# Patient Record
Sex: MALE | Race: WHITE | Employment: FULL TIME | URBAN - METROPOLITAN AREA
[De-identification: names, ages, dates, MRNs, and addresses within clinical notes are randomized per-mention and may not be internally consistent; named-entity substitution may affect disease eponyms.]

---

## 2018-01-15 ENCOUNTER — OFFICE VISIT (OUTPATIENT)
Dept: INTERNAL MEDICINE CLINIC | Age: 48
End: 2018-01-15

## 2018-01-15 VITALS
DIASTOLIC BLOOD PRESSURE: 94 MMHG | BODY MASS INDEX: 39.37 KG/M2 | WEIGHT: 275 LBS | HEIGHT: 70 IN | TEMPERATURE: 98 F | SYSTOLIC BLOOD PRESSURE: 144 MMHG | OXYGEN SATURATION: 95 % | HEART RATE: 66 BPM | RESPIRATION RATE: 16 BRPM

## 2018-01-15 DIAGNOSIS — I87.2 VENOUS INSUFFICIENCY OF BOTH LOWER EXTREMITIES: ICD-10-CM

## 2018-01-15 DIAGNOSIS — L03.115 CELLULITIS OF RIGHT LOWER EXTREMITY: Primary | ICD-10-CM

## 2018-01-15 DIAGNOSIS — I10 ESSENTIAL HYPERTENSION: ICD-10-CM

## 2018-01-15 RX ORDER — CLINDAMYCIN HYDROCHLORIDE 300 MG/1
300 CAPSULE ORAL 3 TIMES DAILY
Qty: 21 CAP | Refills: 0 | Status: SHIPPED | OUTPATIENT
Start: 2018-01-15 | End: 2018-02-05 | Stop reason: ALTCHOICE

## 2018-01-15 RX ORDER — CHLORTHALIDONE 25 MG/1
25 TABLET ORAL DAILY
Qty: 90 TAB | Refills: 3 | Status: SHIPPED | OUTPATIENT
Start: 2018-01-15 | End: 2018-02-05 | Stop reason: SDUPTHER

## 2018-01-15 NOTE — PROGRESS NOTES
Jesus Manuel Roa 1970, is a 49 y.o. male, who is seen today for follow-up after being hospitalized for cellulitis of the right lower extremity. He states that he had developed chills and could not get warm all night and when he looked at his leg it was bright red and tender, he went to the urgent care center was sent to the emergency room and he was hospitalized. By the next day it was considerably worse but then by the following day he was afebrile and it looked better. He says he was treated with clindamycin in the hospital and after discharge, he is not sure of the dose but he says he was taking 3 pills 3 times daily. He has been off medicine for 2 days worked 13 hours one day last week and over the last 2 days the leg has gotten a little more tender and red and swollen. Both legs chronically swell a little bit and he has been off chlorthalidone since last fall when he ran out. He has not been back to the office for follow-up for well over a year, cancel his appointment apparently. He thinks his blood pressure was high even on chlorthalidone. He has a history of kidney stone but no recurrence. Past Medical History:   Diagnosis Date    Asthma     As a young child    Calculus of kidney     Hypertension     Sleep apnea     not using CPAP     He is currently taking no prescription medicine    Visit Vitals    BP (!) 144/94 (BP 1 Location: Right arm, BP Patient Position: Sitting)    Pulse 66    Temp 98 °F (36.7 °C) (Oral)    Resp 16    Ht 5' 10\" (1.778 m)    Wt 275 lb (124.7 kg)    SpO2 95%    BMI 39.46 kg/m2     The left lower extremity does reveal trace to 1+ edema but on the left there is at least 2+ edema in the lower leg is red anteriorly in the lower leg and that area is tender. Skin over that area is shiny. No tenderness in the calf or higher in the leg. Assessment: #1. Recent cellulitis right lower extremity with quite a bit more edema in that legs and in the left leg. It is now more red and swollen and did not take chances I will restart clindamycin for a week, 300 mg 3 times daily. #2.  Lower extremity edema, fertile ground for cellulitis. I will order medium compression support hose for him to be worn every day and taken off at night. #3. Hypertension with history of kidney stones and edema, I will restart chlorthalidone 25 mg daily and her blood pressure is not controlled on this medicine we may add something to it but I think he should stay on a thiazide diuretic view of his history of kidney stones and his hypertension and his edema. This visit lasted 25 minutes, greater than 50% of the time spent counseling on these conditions and rationale for treatment as above. Follow-up 3 weeks    Seth Pool MD FACP    Please note: This document has been produced using voice recognition software. Unrecognized errors in transcription may be present.

## 2018-01-15 NOTE — PROGRESS NOTES
Chief Complaint   Patient presents with   [facility] Follow Up     Follow up from [facility] for vcellulitis and discharged on 1/8/2018. Patient present for lower right leg cellulitis and patient states it has been getting better. Patient states that his leg has been with a lot of fluid retention and that the Hygroten wasn't really working, but wants a refill on it due to him running out of it in Dec. 2017. Patient also complains that his blood pressure went really high and continue to run high after being in the hospital.    Health Maintenance Due   Topic Date Due    COLONOSCOPY  11/14/1987    DTaP/Tdap/Td series (1 - Tdap) 11/14/1990    Influenza Age 5 to Adult  08/01/2017   Patient states he has never gotten a flu vaccine and doesn't want one at this time. 1. Have you been to the ER, urgent care clinic or hospitalized since your last visit? YES.     2. Have you seen or consulted any other health care providers outside of the [address] [place] [address] since your last visit (Include any pap smears or colon screening)? NO      Do you have an Advanced Directive? NO    Would you like information on Advanced Directives?  NO

## 2018-01-15 NOTE — MR AVS SNAPSHOT
Visit Information Date & Time Provider Department Dept. Phone Encounter #  
 1/15/2018 10:00 AM Ryan Olivier MD [facility] [address] [place] [address] Road [phone] [id number] Follow-up Instructions Return in about 3 weeks (around 2/5/2018). Upcoming Health Maintenance Date Due COLONOSCOPY 11/14/1987 DTaP/Tdap/Td series (1 - Tdap) 11/14/1990 Allergies as of 1/15/2018  Review Complete On: 1/15/2018 By: Ryan Olivier MD  
  
 Severity Noted Reaction Type Reactions Pcn [Penicillins]    Not Reported This Time  
 Sulfa (Sulfonamide Antibiotics)  08/21/2015    Other (comments) Current Immunizations  Never Reviewed No immunizations on file. Not reviewed this visit You Were Diagnosed With   
  
 Codes Comments Cellulitis of right lower extremity    -  Primary ICD-10-CM: X40.934 ICD-9-CM: 638. 6 Venous insufficiency of both lower extremities     ICD-10-CM: I87.2 ICD-9-CM: 459.81 Essential hypertension     ICD-10-CM: I10 
ICD-9-CM: 401.9 Vitals BP Pulse Temp Resp Height(growth percentile) Weight(growth percentile) (!) 150/100 (BP 1 Location: Right arm, BP Patient Position: Sitting) 66 98 °F (36.7 °C) (Oral) 16 5' 10\" (1.778 m) 275 lb (124.7 kg) SpO2 BMI Smoking Status 95% 39.46 kg/m2 Never Smoker Vitals History BMI and BSA Data Body Mass Index Body Surface Area  
 39.46 kg/m 2 2.48 m 2 Preferred Pharmacy Pharmacy Name Phone [facility], [address] [place] [address] [phone] Your Updated Medication List  
  
   
This list is accurate as of: 1/15/18 10:49 AM.  Always use your most recent med list.  
  
  
  
  
 chlorthalidone 25 mg tablet Commonly known as:   Ek Take 1 Tab by mouth daily. ibuprofen 600 mg tablet Commonly known as:  MOTRIN Take 1 Tab by mouth every eight (8) hours as needed. LORazepam 0.5 mg tablet Commonly known as:  ATIVAN Take 1 Tab by mouth every eight (8) hours as needed for Anxiety. potassium chloride SR 20 mEq tablet Commonly known as:  K-TAB Take 1 Tab by mouth daily. PROBIOTIC 4X 10-15 mg Tbec Generic drug:  B.infantis-B.ani-B.long-B.bifi Take  by mouth. Prescriptions Sent to Pharmacy Refills  
 chlorthalidone (HYGROTEN) 25 mg tablet 3 Sig: Take 1 Tab by mouth daily. Class: Normal  
 Pharmacy: [facility], [address] [place] [address] #: [phone] Route: Oral  
  
Follow-up Instructions Return in about 3 weeks (around 2/5/2018). Introducing [facility] & HEALTH SERVICES! Dear Luna Cloud: Thank you for requesting a [employer] account. Our records indicate that you already have an active [employer] account. You can access your account anytime at [email or web address] Did you know that you can access your hospital and ER discharge instructions at any time in [employer]? You can also review all of your test results from your hospital stay or ER visit. Additional Information If you have questions, please visit the Frequently Asked Questions section of the [employer] website at [email or web address] Remember, [employer] is NOT to be used for urgent needs. For medical emergencies, dial 911. Now available from your iPhone and Android! Please provide this summary of care documentation to your next provider. Your primary care clinician is listed as Itzel Da Silva. Christopher Riedel. If you have any questions after today's visit, please call [phone].

## 2018-01-18 RX ORDER — CHLORTHALIDONE 25 MG/1
TABLET ORAL
Qty: 90 TAB | Refills: 2 | Status: SHIPPED | OUTPATIENT
Start: 2018-01-18 | End: 2020-01-16 | Stop reason: SDUPTHER

## 2018-02-05 ENCOUNTER — OFFICE VISIT (OUTPATIENT)
Dept: INTERNAL MEDICINE CLINIC | Age: 48
End: 2018-02-05

## 2018-02-05 VITALS
DIASTOLIC BLOOD PRESSURE: 96 MMHG | BODY MASS INDEX: 39.31 KG/M2 | RESPIRATION RATE: 12 BRPM | SYSTOLIC BLOOD PRESSURE: 132 MMHG | HEART RATE: 77 BPM | TEMPERATURE: 98.2 F | OXYGEN SATURATION: 98 % | WEIGHT: 274.6 LBS | HEIGHT: 70 IN

## 2018-02-05 DIAGNOSIS — E66.9 OBESITY (BMI 30-39.9): ICD-10-CM

## 2018-02-05 DIAGNOSIS — I10 ESSENTIAL HYPERTENSION: Primary | ICD-10-CM

## 2018-02-05 DIAGNOSIS — I87.2 VENOUS INSUFFICIENCY OF BOTH LOWER EXTREMITIES: ICD-10-CM

## 2018-02-05 NOTE — MR AVS SNAPSHOT
[address] 
[phone] Patient: Barbara Novak 
MRN:  [id number] Visit Information Date & Time Provider Department Dept. Phone Encounter #  
 2018 10:00 AM Susanna Cerna MD [facility] [address] [place] [address] [phone] [id number] Follow-up Instructions Return in about 4 months (around 2018). Upcoming Health Maintenance Date Due COLONOSCOPY 1987 DTaP/Tdap/Td series (1 - Tdap) 1990 Allergies as of 2018  Review Complete On: 2018 By: Susanna Cerna MD  
  
 Severity Noted Reaction Type Reactions Pcn [Penicillins]    Not Reported This Time  
 Sulfa (Sulfonamide Antibiotics)  2015    Other (comments) Current Immunizations  Never Reviewed No immunizations on file. Not reviewed this visit You Were Diagnosed With   
  
 Codes Comments Essential hypertension    -  Primary ICD-10-CM: I10 
ICD-9-CM: 401.9 Venous insufficiency of both lower extremities     ICD-10-CM: I87.2 ICD-9-CM: 459.81 Obesity (BMI 30-39. 9)     ICD-10-CM: E66.9 ICD-9-CM: 278.00 Vitals BP Pulse Temp Resp Height(growth percentile) Weight(growth percentile) (!) 132/96 77 98.2 °F (36.8 °C) (Oral) 12 5' 10\" (1.778 m) 274 lb 9.6 oz (124.6 kg) SpO2 BMI Smoking Status 98% 39.4 kg/m2 Never Smoker Vitals History BMI and BSA Data Body Mass Index Body Surface Area  
 39.4 kg/m 2 2.48 m 2 Preferred Pharmacy Pharmacy Name Phone [facility], [address] [place] [address] [phone] Your Updated Medication List  
  
   
This list is accurate as of: 18 10:31 AM.  Always use your most recent med list.  
  
  
  
  
 chlorthalidone 25 mg tablet Commonly known as:  HYGROTEN  
TAKE 1 TABLET BY MOUTH EVERY DAY Follow-up Instructions Return in about 4 months (around 6/5/2018). Introducing [facility] & HEALTH SERVICES! Dear Mely Dugan: Thank you for requesting a [employer] account. Our records indicate that you already have an active [employer] account. You can access your account anytime at [email or web address] Did you know that you can access your hospital and ER discharge instructions at any time in [employer]? You can also review all of your test results from your hospital stay or ER visit. Additional Information If you have questions, please visit the Frequently Asked Questions section of the [employer] website at [email or web address] Remember, [employer] is NOT to be used for urgent needs. For medical emergencies, dial 911. Now available from your iPhone and Android! Please provide this summary of care documentation to your next provider. Your primary care clinician is listed as José Miguel Horne. Susanne Bruno. If you have any questions after today's visit, please call [phone].

## 2018-02-05 NOTE — PROGRESS NOTES
Lynette Dennison 1970, is a 49 y.o. male, who is seen today for reevaluation of hypertension lower extremity edema due to venous insufficiency, possible cellulitis on the right, obesity and history of kidney stones. He has gotten support hose and is wearing those and that has helped a lot with the swelling. He elevates his legs whenever possible. He is taking chlorthalidone every day. He has not lost any weight from when I saw him 3 weeks ago. Past Medical History:   Diagnosis Date    Asthma     As a young child    Calculus of kidney     Hypertension     Sleep apnea     not using CPAP     Current Outpatient Prescriptions   Medication Sig Dispense Refill    chlorthalidone (HYGROTEN) 25 mg tablet TAKE 1 TABLET BY MOUTH EVERY DAY 90 Tab 2     Visit Vitals    BP (!) 132/96    Pulse 77    Temp 98.2 °F (36.8 °C) (Oral)    Resp 12    Ht 5' 10\" (1.778 m)    Wt 274 lb 9.6 oz (124.6 kg)    SpO2 98%    BMI 39.4 kg/m2     Carotids are 2+ without bruits. Lungs are clear to percussion. Good breath sounds with no wheezing or crackles. Heart reveals a regular rhythm with normal S1 and S2 no murmur gallop click or rub. Apical impulse is not palpable. Abdomen is obese soft nontender with no hepatosplenomegaly or masses. Lower legs reveal 2+ edema with some redness in the lower right leg but no evidence of infection at this point. There is no weeping of fluid. Mild tenderness of the lower right leg. Assessment: #1.  Lower extremity edema with no evidence of infection. He will continue support hose and leg elevation as much as possible. #2.  Obesity with body mass index of 39.4, I have told him he needs to lose considerable weight to decrease his risks from continued swelling of the legs and hypertension and other issues. He will be changing his diet somewhat and see if he can lose some weight. #3. Hypertension inadequately controlled.   He needs to lose more weight, he will continue low-salt diet and chlorthalidone 25 mg daily. Follow-up 4 months    Seth Feliz MD FACP    Please note: This document has been produced using voice recognition software. Unrecognized errors in transcription may be present.

## 2018-06-13 ENCOUNTER — OFFICE VISIT (OUTPATIENT)
Dept: INTERNAL MEDICINE CLINIC | Age: 48
End: 2018-06-13

## 2018-06-13 VITALS
SYSTOLIC BLOOD PRESSURE: 138 MMHG | TEMPERATURE: 98.5 F | OXYGEN SATURATION: 97 % | WEIGHT: 272 LBS | HEART RATE: 85 BPM | RESPIRATION RATE: 12 BRPM | BODY MASS INDEX: 38.94 KG/M2 | DIASTOLIC BLOOD PRESSURE: 88 MMHG | HEIGHT: 70 IN

## 2018-06-13 DIAGNOSIS — I10 PRIMARY HYPERTENSION: Primary | ICD-10-CM

## 2018-06-13 DIAGNOSIS — R63.5 WEIGHT GAIN: ICD-10-CM

## 2018-06-13 DIAGNOSIS — I10 PRIMARY HYPERTENSION: ICD-10-CM

## 2018-06-13 DIAGNOSIS — Z00.00 ROUTINE GENERAL MEDICAL EXAMINATION AT A HEALTH CARE FACILITY: ICD-10-CM

## 2018-06-13 DIAGNOSIS — E66.9 OBESITY (BMI 30-39.9): ICD-10-CM

## 2018-06-13 NOTE — PROGRESS NOTES
Darius Moran 1970, is a 49 y.o. male, who is seen today for reevaluation of hypertension obesity but also complains of tingling sensation sometimes in the face sometimes all up and down both arms. It will last up to 2 hours at a time, comes when he is sitting around or with activity. It was just watching a baseball game recently when it occurred. It has not happened the last 2 days but over the last month it has happened numerous times. He says when he had this a few years ago he was placed on potassium and it went away but then he stopped the potassium. He takes chlorthalidone for hypertension and tries to follow a low-salt diet. He tries to keep his weight down but has not been terribly successful. No other new complaints. Past Medical History:   Diagnosis Date    Asthma     As a young child    Calculus of kidney     Hypertension     Sleep apnea     not using CPAP     Current Outpatient Prescriptions   Medication Sig Dispense Refill    chlorthalidone (HYGROTEN) 25 mg tablet TAKE 1 TABLET BY MOUTH EVERY DAY 90 Tab 2     Visit Vitals    /88    Pulse 85    Temp 98.5 °F (36.9 °C) (Oral)    Resp 12    Ht 5' 10\" (1.778 m)    Wt 272 lb (123.4 kg)    SpO2 97%    BMI 39.03 kg/m2     Carotids are 2+ without bruits. Lungs are clear to percussion. Good breath sounds with no wheezing or crackles. Heart reveals a regular rhythm with normal S1-S2 no murmur gallop click or rub. Apical impulse is not palpable. Abdomen is soft and nontender with no obvious hepatosplenomegaly or masses and no bruits. Extremities reveal no clubbing cyanosis or edema. Pulses are 2+ throughout. Assessment: #1. Hypertension poorly controlled. He will continue chlorthalidone 25 mg daily and no added salt diet. #2.  Obesity, have explained to him today again how important it is to start to lose weight. He will be cutting back on white starches and eating more vegetables and salads.   #3. Paresthesias, he is quite sure that is not related to hyperventilation or anxiety. We will check BMP and magnesium at this point and replace any electrolytes if needed based on those results within the next day or 2, if symptoms worsen he will call me. Follow-up 6 months for a physical    Seth Poon MD FACP    Please note: This document has been produced using voice recognition software. Unrecognized errors in transcription may be present.

## 2018-06-13 NOTE — MR AVS SNAPSHOT
[facility] 
 
 
 [address], [address] [facility] 
[phone] Patient: Lorraine Suero 
MRN:  [id number] Visit Information Date & Time Provider Department Dept. Phone Encounter #  
 2018  3:30 PM Pipo Massey MD [facility] [phone] Your Appointments 2018  8:15 AM  
LAB with [facility] NURSE VISIT [facility] ([facility] [address], [address] [place] [address] [place]  
  
   
 [address], [address]  
  
    
 2018  7:45 AM  
PHYSICAL with Pipo Massey MD  
[facility] Appt Note: rpe  
 [address] [facility] [address] [place]  
  
   
 [address], [address] Upcoming Health Maintenance Date Due COLONOSCOPY 1987 DTaP/Tdap/Td series (1 - Tdap) 1990 Influenza Age 5 to Adult 2018 Allergies as of 2018  Review Complete On: 2018 By: Pipo Massey MD  
  
 Severity Noted Reaction Type Reactions Pcn [Penicillins]    Not Reported This Time  
 Sulfa (Sulfonamide Antibiotics)  2015    Other (comments) Current Immunizations  Never Reviewed No immunizations on file. Not reviewed this visit You Were Diagnosed With   
  
 Codes Comments Primary hypertension    -  Primary ICD-10-CM: I10 
ICD-9-CM: 401.9 Paresthesias/numbness     ICD-10-CM: R20.9 ICD-9-CM: 782.0 Obesity (BMI 30-39. 9)     ICD-10-CM: E66.9 ICD-9-CM: 278.00 Routine general medical examination at a health care facility     ICD-10-CM: Z00.00 ICD-9-CM: V70.0 Vitals BP Pulse Temp Resp Height(growth percentile) Weight(growth percentile) 138/88 85 98.5 °F (36.9 °C) (Oral) 12 5' 10\" (1.778 m) 272 lb (123.4 kg) SpO2 BMI Smoking Status 97% 39.03 kg/m2 Never Smoker Vitals History BMI and BSA Data Body Mass Index Body Surface Area 39.03 kg/m 2 2.47 m 2 Preferred Pharmacy Pharmacy Name Phone [facility] [phone] Your Updated Medication List  
  
   
This list is accurate as of 6/13/18  4:35 PM.  Always use your most recent med list.  
  
  
  
  
 chlorthalidone 25 mg tablet Commonly known as:  HYGROTEN  
TAKE 1 TABLET BY MOUTH EVERY DAY To-Do List   
 Around 06/13/2018 Lab:  MAGNESIUM Around 06/13/2018 Lab:  METABOLIC PANEL, BASIC Introducing \[facility]\"" & [facility] SERVICES! Dear Conception Caitlyn: Thank you for requesting a Accelo[employer] account. Our records indicate that you already have an active [employer] account. You can access your account anytime at [email or web address] Did you know that you can access your hospital and ER discharge instructions at any time in AcceloWeb? You can also review all of your test results from your hospital stay or ER visit. Additional Information If you have questions, please visit the Frequently Asked Questions section of the [employer] website at [email or web address] Remember, [employer]eloWeb is NOT to be used for urgent needs. For medical emergencies, dial 911. Now available from your iPhone and Android! Please provide this summary of care documentation to your next provider. Your primary care clinician is listed as Hailee Madden. Davonte Jeong. If you have any questions after today's visit, please call [phone].

## 2018-06-14 LAB
ANION GAP SERPL CALC-SCNC: 19 MMOL/L
BUN SERPL-MCNC: 15 MG/DL (ref 6–22)
CALCIUM SERPL-MCNC: 9.9 MG/DL (ref 8.4–10.4)
CHLORIDE SERPL-SCNC: 99 MMOL/L (ref 98–110)
CO2 SERPL-SCNC: 26 MMOL/L (ref 20–32)
CREAT SERPL-MCNC: 1 MG/DL (ref 0.5–1.2)
GFRAA, 66117: >60
GFRNA, 66118: >60
GLUCOSE SERPL-MCNC: 139 MG/DL (ref 70–99)
MAGNESIUM SERPL-MCNC: 2 MG/DL (ref 1.6–2.5)
POTASSIUM SERPL-SCNC: 3.3 MMOL/L (ref 3.5–5.5)
SODIUM SERPL-SCNC: 144 MMOL/L (ref 133–145)

## 2018-06-15 ENCOUNTER — TELEPHONE (OUTPATIENT)
Dept: INTERNAL MEDICINE CLINIC | Age: 48
End: 2018-06-15

## 2018-06-15 RX ORDER — POTASSIUM CHLORIDE 20 MEQ/1
20 TABLET, EXTENDED RELEASE ORAL DAILY
Qty: 90 TAB | Refills: 1 | Status: SHIPPED | OUTPATIENT
Start: 2018-06-15 | End: 2018-12-19 | Stop reason: ALTCHOICE

## 2018-06-15 NOTE — PROGRESS NOTES
Please notify the patient that his magnesium level is normal and other electrolytes are normal except potassium is somewhat low at 3.3. It runs low because of chlorthalidone. I will send a prescription to his pharmacy for potassium at this point and see if that helps.

## 2018-06-15 NOTE — TELEPHONE ENCOUNTER
----- Message from Pipo Massey MD sent at 6/15/2018  3:35 PM EDT -----  Please notify the patient that his magnesium level is normal and other electrolytes are normal except potassium is somewhat low at 3.3. It runs low because of chlorthalidone. I will send a prescription to his pharmacy for potassium at this point and see if that helps.

## 2018-09-11 RX ORDER — CHLORTHALIDONE 25 MG/1
TABLET ORAL
Qty: 90 TAB | Refills: 1 | Status: SHIPPED | OUTPATIENT
Start: 2018-09-11 | End: 2018-12-19 | Stop reason: ALTCHOICE

## 2018-12-12 ENCOUNTER — APPOINTMENT (OUTPATIENT)
Dept: INTERNAL MEDICINE CLINIC | Age: 48
End: 2018-12-12

## 2018-12-13 LAB
A-G RATIO,AGRAT: 2.6 RATIO (ref 1.1–2.6)
ABSOLUTE LYMPHOCYTE COUNT, 10803: 1 K/UL (ref 1–4.8)
ALBUMIN SERPL-MCNC: 4.6 G/DL (ref 3.5–5)
ALP SERPL-CCNC: 57 U/L (ref 25–115)
ALT SERPL-CCNC: 44 U/L (ref 5–40)
ANION GAP SERPL CALC-SCNC: 14 MMOL/L
AST SERPL W P-5'-P-CCNC: 27 U/L (ref 10–37)
BASOPHILS # BLD: 0 K/UL (ref 0–0.2)
BASOPHILS NFR BLD: 1 % (ref 0–2)
BILIRUB SERPL-MCNC: 0.6 MG/DL (ref 0.2–1.2)
BUN SERPL-MCNC: 14 MG/DL (ref 6–22)
CALCIUM SERPL-MCNC: 9.3 MG/DL (ref 8.4–10.4)
CHLORIDE SERPL-SCNC: 100 MMOL/L (ref 98–110)
CHOLEST SERPL-MCNC: 213 MG/DL (ref 110–200)
CO2 SERPL-SCNC: 26 MMOL/L (ref 20–32)
CREAT SERPL-MCNC: 0.9 MG/DL (ref 0.5–1.2)
EOSINOPHIL # BLD: 0.4 K/UL (ref 0–0.5)
EOSINOPHIL NFR BLD: 11 % (ref 0–6)
ERYTHROCYTE [DISTWIDTH] IN BLOOD BY AUTOMATED COUNT: 13.3 % (ref 10–15.5)
GFRAA, 66117: >60
GFRNA, 66118: >60
GLOBULIN,GLOB: 1.8 G/DL (ref 2–4)
GLUCOSE SERPL-MCNC: 118 MG/DL (ref 70–99)
GRANULOCYTES,GRANS: 44 % (ref 40–75)
HCT VFR BLD AUTO: 47 % (ref 39.3–51.6)
HDLC SERPL-MCNC: 39 MG/DL (ref 40–59)
HDLC SERPL-MCNC: 5.5 MG/DL (ref 0–5)
HGB BLD-MCNC: 16.3 G/DL (ref 13.1–17.2)
LDLC SERPL CALC-MCNC: 141 MG/DL (ref 50–99)
LYMPHOCYTES, LYMLT: 32 % (ref 20–45)
MCH RBC QN AUTO: 34 PG (ref 26–34)
MCHC RBC AUTO-ENTMCNC: 35 G/DL (ref 31–36)
MCV RBC AUTO: 97 FL (ref 80–95)
MONOCYTES # BLD: 0.4 K/UL (ref 0.1–1)
MONOCYTES NFR BLD: 13 % (ref 3–12)
NEUTROPHILS # BLD AUTO: 1.4 K/UL (ref 1.8–7.7)
PLATELET # BLD AUTO: 113 K/UL (ref 140–440)
PMV BLD AUTO: 11.3 FL (ref 9–13)
POTASSIUM SERPL-SCNC: 3.4 MMOL/L (ref 3.5–5.5)
PROT SERPL-MCNC: 6.4 G/DL (ref 6.4–8.3)
RBC # BLD AUTO: 4.83 M/UL (ref 3.8–5.8)
SODIUM SERPL-SCNC: 140 MMOL/L (ref 133–145)
T4 FREE SERPL-MCNC: 1.1 NG/DL (ref 0.9–1.8)
TRIGL SERPL-MCNC: 167 MG/DL (ref 40–149)
TSH SERPL DL<=0.005 MIU/L-ACNC: 0.47 MCU/ML (ref 0.27–4.2)
VLDLC SERPL CALC-MCNC: 33 MG/DL (ref 8–30)
WBC # BLD AUTO: 3.2 K/UL (ref 4–11)

## 2018-12-19 ENCOUNTER — OFFICE VISIT (OUTPATIENT)
Dept: INTERNAL MEDICINE CLINIC | Age: 48
End: 2018-12-19

## 2018-12-19 VITALS
HEIGHT: 70 IN | OXYGEN SATURATION: 96 % | BODY MASS INDEX: 39.77 KG/M2 | TEMPERATURE: 97.9 F | DIASTOLIC BLOOD PRESSURE: 88 MMHG | WEIGHT: 277.8 LBS | SYSTOLIC BLOOD PRESSURE: 138 MMHG | RESPIRATION RATE: 14 BRPM | HEART RATE: 69 BPM

## 2018-12-19 DIAGNOSIS — Z00.00 ROUTINE GENERAL MEDICAL EXAMINATION AT A HEALTH CARE FACILITY: Primary | ICD-10-CM

## 2018-12-19 DIAGNOSIS — E66.9 OBESITY (BMI 30-39.9): ICD-10-CM

## 2018-12-19 DIAGNOSIS — R73.01 IMPAIRED FASTING GLUCOSE: ICD-10-CM

## 2018-12-19 DIAGNOSIS — I10 PRIMARY HYPERTENSION: ICD-10-CM

## 2018-12-19 DIAGNOSIS — I87.2 VENOUS INSUFFICIENCY OF BOTH LOWER EXTREMITIES: ICD-10-CM

## 2018-12-19 NOTE — PROGRESS NOTES
Mirta Smith 1970, is a 51 y.o. male, who is seen today for a routine physical exam and follow-up on obesity hypertension and impaired fasting glucose. I talked to him before about the importance of weight loss but he is gained 5 pounds over the last 6 months, he admits that he eats too many white starches. He feels well. He takes hydrochlorthiazide every day but only uses potassium if he feels some tingling in his hands which he does from time to time. Past Medical History:   Diagnosis Date    Asthma     As a young child    Calculus of kidney     Hypertension     Sleep apnea     not using CPAP     Past Surgical History:   Procedure Laterality Date    HX HEENT      tonsillectomy    HX LITHOTRIPSY  10/9/2010    Left upper 3rd ureteral calculus    HX ORTHOPAEDIC      Left shoulder     Current Outpatient Medications   Medication Sig Dispense Refill    chlorthalidone (HYGROTEN) 25 mg tablet TAKE 1 TABLET BY MOUTH EVERY DAY 90 Tab 2     Allergies   Allergen Reactions    Pcn [Penicillins] Not Reported This Time    Sulfa (Sulfonamide Antibiotics) Other (comments)     Social History     Socioeconomic History    Marital status:      Spouse name: Not on file    Number of children: Not on file    Years of education: Not on file    Highest education level: Not on file   Tobacco Use    Smoking status: Never Smoker    Smokeless tobacco: Never Used   Substance and Sexual Activity    Alcohol use: Yes     Alcohol/week: 1.2 oz     Types: 2 Cans of beer per week     Comment: occasionally     Drug use: No     Visit Vitals  /88 (BP 1 Location: Right arm, BP Patient Position: Sitting)   Pulse 69   Temp 97.9 °F (36.6 °C) (Oral)   Resp 14   Ht 5' 10\" (1.778 m)   Wt 277 lb 12.8 oz (126 kg)   SpO2 96%   BMI 39.86 kg/m²       Ear canals and tympanic membranes appear normal.  Oral cavity reveals no lesions. Neck reveals no adenopathy or thyromegaly. Carotids are 2+ without bruits.   Lungs are clear to percussion. Good breath sounds with no wheezing or crackles. Heart reveals a regular rhythm with normal S1 and S2 no murmur gallop click or rub. Apical impulse is not palpable. Abdomen is soft and nontender with no hepatosplenomegaly or masses and no bruits. Extremities reveal no clubbing or cyanosis, he does have mild venous stasis changes in both legs and trace edema bilaterally. Pulses are 2+. Results for orders placed or performed in visit on 12/12/18   LIPID PANEL   Result Value Ref Range    Triglyceride 167 (H) 40 - 149 mg/dL    HDL Cholesterol 39 (L) 40 - 59 mg/dL    Cholesterol, total 213 (H) 110 - 200 mg/dL    CHOLESTEROL/HDL 5.5 0.0 - 5.0    LDL, calculated 141 (H) 50 - 99 mg/dL    VLDL, calculated 33 (H) 8 - 30 mg/dL   METABOLIC PANEL, COMPREHENSIVE   Result Value Ref Range    Glucose 118 (H) 70 - 99 mg/dL    BUN 14 6 - 22 mg/dL    Creatinine 0.9 0.5 - 1.2 mg/dL    Sodium 140 133 - 145 mmol/L    Potassium 3.4 (L) 3.5 - 5.5 mmol/L    Chloride 100 98 - 110 mmol/L    CO2 26 20 - 32 mmol/L    AST (SGOT) 27 10 - 37 U/L    ALT (SGPT) 44 (H) 5 - 40 U/L    Alk.  phosphatase 57 25 - 115 U/L    Bilirubin, total 0.6 0.2 - 1.2 mg/dL    Calcium 9.3 8.4 - 10.4 mg/dL    Protein, total 6.4 6.4 - 8.3 g/dL    Albumin 4.6 3.5 - 5.0 g/dL    A-G Ratio 2.6 1.1 - 2.6 ratio    Globulin 1.8 (L) 2.0 - 4.0 g/dL    Anion gap 14.0 mmol/L    GFRAA >60.0 >60.0    GFRNA >60.0 >60.0   T4, FREE   Result Value Ref Range    T4, Free 1.1 0.9 - 1.8 ng/dL   TSH 3RD GENERATION   Result Value Ref Range    TSH 0.47 0.27 - 4.20 mcU/mL   CBC WITH AUTOMATED DIFF   Result Value Ref Range    WBC 3.2 (L) 4.0 - 11.0 K/uL    RBC 4.83 3.80 - 5.80 M/uL    HGB 16.3 13.1 - 17.2 g/dL    HCT 47.0 39.3 - 51.6 %    MCV 97 (H) 80 - 95 fL    MCH 34 26 - 34 pg    MCHC 35 31 - 36 g/dL    RDW 13.3 10.0 - 15.5 %    PLATELET 700 (L) 375 - 440 K/uL    MPV 11.3 9.0 - 13.0 fL    NEUTROPHILS 44 40 - 75 %    Lymphocytes 32 20 - 45 %    MONOCYTES 13 (H) 3 - 12 %    EOSINOPHILS 11 (H) 0 - 6 %    BASOPHILS 1 0 - 2 %    ABS. NEUTROPHILS 1.4 (L) 1.8 - 7.7 K/uL    ABSOLUTE LYMPHOCYTE COUNT 1.0 1.0 - 4.8 K/uL    ABS. MONOCYTES 0.4 0.1 - 1.0 K/uL    ABS. EOSINOPHILS 0.4 0.0 - 0.5 K/uL    ABS. BASOPHILS 0.0 0.0 - 0.2 K/uL     Assessment: #1. Hypertension is adequately controlled. He will continue chlorthalidone 25 mg daily and no added salt diet. #2.  Impaired fasting glucose slightly better than last visit, I talked to him again today about the importance of weight loss, trying to avoid the development of diabetes. #3.  Obesity up 5 pounds in 6 months, I talked to him at length about methods that may be helpful in losing weight, namely cutting back on white starches and eating more vegetables and salads in their place. Follow-up in 6 months with lab, he declines flu shot as always. Seth Tracey MD FACP    Please note: This document has been produced using voice recognition software. Unrecognized errors in transcription may be present.

## 2018-12-19 NOTE — PROGRESS NOTES
1. Have you been to the ER, urgent care clinic or hospitalized since your last visit? NO           2. Have you seen or consulted any other health care providers outside of the [address] [place] [address] since your last visit (Include any pap smears or colon screening)? NO    Do you have an Advanced Directive? NO    Would you like information on Advanced Directives?  NO

## 2018-12-20 DIAGNOSIS — R63.5 WEIGHT GAIN: ICD-10-CM

## 2018-12-20 DIAGNOSIS — Z00.00 ROUTINE GENERAL MEDICAL EXAMINATION AT A HEALTH CARE FACILITY: ICD-10-CM

## 2019-06-19 ENCOUNTER — APPOINTMENT (OUTPATIENT)
Dept: INTERNAL MEDICINE CLINIC | Age: 49
End: 2019-06-19

## 2019-06-20 LAB
ANION GAP SERPL CALC-SCNC: 15 MMOL/L
BUN SERPL-MCNC: 15 MG/DL (ref 6–22)
CALCIUM SERPL-MCNC: 9.4 MG/DL (ref 8.4–10.4)
CHLORIDE SERPL-SCNC: 98 MMOL/L (ref 98–110)
CO2 SERPL-SCNC: 29 MMOL/L (ref 20–32)
CREAT SERPL-MCNC: 1 MG/DL (ref 0.5–1.2)
GFRAA, 66117: >60
GFRNA, 66118: >60
GLUCOSE SERPL-MCNC: 109 MG/DL (ref 70–99)
POTASSIUM SERPL-SCNC: 3.2 MMOL/L (ref 3.5–5.5)
SODIUM SERPL-SCNC: 142 MMOL/L (ref 133–145)

## 2019-06-26 ENCOUNTER — OFFICE VISIT (OUTPATIENT)
Dept: INTERNAL MEDICINE CLINIC | Age: 49
End: 2019-06-26

## 2019-06-26 VITALS
TEMPERATURE: 97.5 F | WEIGHT: 263.4 LBS | HEIGHT: 70 IN | DIASTOLIC BLOOD PRESSURE: 88 MMHG | RESPIRATION RATE: 12 BRPM | BODY MASS INDEX: 37.71 KG/M2 | HEART RATE: 70 BPM | OXYGEN SATURATION: 97 % | SYSTOLIC BLOOD PRESSURE: 130 MMHG

## 2019-06-26 DIAGNOSIS — R73.01 IMPAIRED FASTING GLUCOSE: ICD-10-CM

## 2019-06-26 DIAGNOSIS — I10 PRIMARY HYPERTENSION: Primary | ICD-10-CM

## 2019-06-26 DIAGNOSIS — E66.9 OBESITY (BMI 30-39.9): ICD-10-CM

## 2019-06-26 DIAGNOSIS — I10 PRIMARY HYPERTENSION: ICD-10-CM

## 2019-06-26 DIAGNOSIS — F41.9 ANXIETY: ICD-10-CM

## 2019-06-26 DIAGNOSIS — Z00.00 ROUTINE GENERAL MEDICAL EXAMINATION AT A HEALTH CARE FACILITY: ICD-10-CM

## 2019-06-26 RX ORDER — LORAZEPAM 0.5 MG/1
TABLET ORAL
COMMUNITY
End: 2019-06-26 | Stop reason: SDUPTHER

## 2019-06-26 RX ORDER — POTASSIUM CHLORIDE 20 MEQ/1
20 TABLET, EXTENDED RELEASE ORAL DAILY
COMMUNITY
End: 2021-08-31 | Stop reason: SDUPTHER

## 2019-06-26 RX ORDER — LORAZEPAM 0.5 MG/1
0.5 TABLET ORAL
Qty: 60 TAB | Refills: 0 | Status: SHIPPED | OUTPATIENT
Start: 2019-06-26 | End: 2020-01-16 | Stop reason: SDUPTHER

## 2019-06-26 NOTE — PROGRESS NOTES
Javier Pena 1970, is a 51 y.o. male, who is seen today for reevaluation of hypertension obesity impaired fasting glucose anxiety. He occasionally uses lorazepam 0.5 mg and that helps him. He needs a refill. He takes his blood pressure medicine regularly. We talked about weight loss 6 months ago and he says he lost 30 pounds but then regained some of that weight and is down just 14 pounds from when he was 6 months ago. He feels well and is taking his blood pressure medicine regularly. Past Medical History:   Diagnosis Date    Asthma     As a young child    Calculus of kidney     Hypertension     Sleep apnea     not using CPAP     Current Outpatient Medications   Medication Sig Dispense Refill    potassium chloride (KLOR-CON M20) 20 mEq tablet Take  by mouth two (2) times a day.  LORazepam (ATIVAN) 0.5 mg tablet Take 1 Tab by mouth every four (4) hours as needed for Anxiety. Max Daily Amount: 3 mg. 60 Tab 0    chlorthalidone (HYGROTEN) 25 mg tablet TAKE 1 TABLET BY MOUTH EVERY DAY 90 Tab 2     Visit Vitals  /88 (BP 1 Location: Left arm, BP Patient Position: Sitting)   Pulse 70   Temp 97.5 °F (36.4 °C) (Oral)   Resp 12   Ht 5' 10\" (1.778 m)   Wt 263 lb 6.4 oz (119.5 kg)   SpO2 97%   BMI 37.79 kg/m²     Carotids are 2+ without bruits. Lungs are clear to percussion. Good breath sounds with no wheezing or crackles. Heart reveals a regular rhythm with normal S1 and S2 no murmur gallop click or rub. Apical impulse is not palpable. Abdomen soft and nontender with no hepatospleno megaly or masses and no bruits. Extremities reveal no clubbing cyanosis or edema. Pulses are 2+.     Results for orders placed or performed in visit on 12/12/18   LIPID PANEL   Result Value Ref Range    Triglyceride 167 (H) 40 - 149 mg/dL    HDL Cholesterol 39 (L) 40 - 59 mg/dL    Cholesterol, total 213 (H) 110 - 200 mg/dL    CHOLESTEROL/HDL 5.5 0.0 - 5.0    LDL, calculated 141 (H) 50 - 99 mg/dL VLDL, calculated 33 (H) 8 - 30 mg/dL   METABOLIC PANEL, COMPREHENSIVE   Result Value Ref Range    Glucose 118 (H) 70 - 99 mg/dL    BUN 14 6 - 22 mg/dL    Creatinine 0.9 0.5 - 1.2 mg/dL    Sodium 140 133 - 145 mmol/L    Potassium 3.4 (L) 3.5 - 5.5 mmol/L    Chloride 100 98 - 110 mmol/L    CO2 26 20 - 32 mmol/L    AST (SGOT) 27 10 - 37 U/L    ALT (SGPT) 44 (H) 5 - 40 U/L    Alk. phosphatase 57 25 - 115 U/L    Bilirubin, total 0.6 0.2 - 1.2 mg/dL    Calcium 9.3 8.4 - 10.4 mg/dL    Protein, total 6.4 6.4 - 8.3 g/dL    Albumin 4.6 3.5 - 5.0 g/dL    A-G Ratio 2.6 1.1 - 2.6 ratio    Globulin 1.8 (L) 2.0 - 4.0 g/dL    Anion gap 14.0 mmol/L    GFRAA >60.0 >60.0    GFRNA >60.0 >60.0   T4, FREE   Result Value Ref Range    T4, Free 1.1 0.9 - 1.8 ng/dL   TSH 3RD GENERATION   Result Value Ref Range    TSH 0.47 0.27 - 4.20 mcU/mL   CBC WITH AUTOMATED DIFF   Result Value Ref Range    WBC 3.2 (L) 4.0 - 11.0 K/uL    RBC 4.83 3.80 - 5.80 M/uL    HGB 16.3 13.1 - 17.2 g/dL    HCT 47.0 39.3 - 51.6 %    MCV 97 (H) 80 - 95 fL    MCH 34 26 - 34 pg    MCHC 35 31 - 36 g/dL    RDW 13.3 10.0 - 15.5 %    PLATELET 201 (L) 661 - 440 K/uL    MPV 11.3 9.0 - 13.0 fL    NEUTROPHILS 44 40 - 75 %    Lymphocytes 32 20 - 45 %    MONOCYTES 13 (H) 3 - 12 %    EOSINOPHILS 11 (H) 0 - 6 %    BASOPHILS 1 0 - 2 %    ABS. NEUTROPHILS 1.4 (L) 1.8 - 7.7 K/uL    ABSOLUTE LYMPHOCYTE COUNT 1.0 1.0 - 4.8 K/uL    ABS. MONOCYTES 0.4 0.1 - 1.0 K/uL    ABS. EOSINOPHILS 0.4 0.0 - 0.5 K/uL    ABS. BASOPHILS 0.0 0.0 - 0.2 K/uL     Assessment: #1. Impaired fasting glucose improved, I have encouraged him to keep his weight down the very best he can, with 2500 jed a day seems to do quite well. #2. Hypertension controlled, he will continue chlorthalidone 25 mg daily and work on weight loss. #3. Anxiety doing well, he will continue lorazepam 0.5 mg when needed. #4.  Obesity improved, he is going to be working on further weight loss as noted above.     Follow-up 6 months for complete evaluation and we will schedule colonoscopy then. Seth Cartwright MD FACP    Please note: This document has been produced using voice recognition software. Unrecognized errors in transcription may be present.

## 2019-09-24 RX ORDER — CHLORTHALIDONE 25 MG/1
TABLET ORAL
Qty: 90 TAB | Refills: 1 | Status: SHIPPED | OUTPATIENT
Start: 2019-09-24 | End: 2020-04-21

## 2020-01-02 DIAGNOSIS — Z00.00 ROUTINE GENERAL MEDICAL EXAMINATION AT A HEALTH CARE FACILITY: ICD-10-CM

## 2020-01-11 LAB
ALBUMIN SERPL-MCNC: 4.7 G/DL (ref 3.5–5.5)
ALBUMIN/GLOB SERPL: 2.9 {RATIO} (ref 1.2–2.2)
ALP SERPL-CCNC: 57 IU/L (ref 39–117)
ALT SERPL-CCNC: 58 IU/L (ref 0–44)
AST SERPL-CCNC: 35 IU/L (ref 0–40)
BASOPHILS # BLD AUTO: 0 X10E3/UL (ref 0–0.2)
BASOPHILS NFR BLD AUTO: 1 %
BILIRUB SERPL-MCNC: 0.5 MG/DL (ref 0–1.2)
BUN SERPL-MCNC: 17 MG/DL (ref 6–24)
BUN/CREAT SERPL: 16 (ref 9–20)
CALCIUM SERPL-MCNC: 9.4 MG/DL (ref 8.7–10.2)
CHLORIDE SERPL-SCNC: 98 MMOL/L (ref 96–106)
CHOLEST SERPL-MCNC: 224 MG/DL (ref 100–199)
CO2 SERPL-SCNC: 24 MMOL/L (ref 20–29)
CREAT SERPL-MCNC: 1.08 MG/DL (ref 0.76–1.27)
EOSINOPHIL # BLD AUTO: 0.4 X10E3/UL (ref 0–0.4)
EOSINOPHIL NFR BLD AUTO: 11 %
ERYTHROCYTE [DISTWIDTH] IN BLOOD BY AUTOMATED COUNT: 12.9 % (ref 11.6–15.4)
GLOBULIN SER CALC-MCNC: 1.6 G/DL (ref 1.5–4.5)
GLUCOSE SERPL-MCNC: 111 MG/DL (ref 65–99)
HCT VFR BLD AUTO: 48.8 % (ref 37.5–51)
HDLC SERPL-MCNC: 38 MG/DL
HGB BLD-MCNC: 16.8 G/DL (ref 13–17.7)
IMM GRANULOCYTES # BLD AUTO: 0 X10E3/UL (ref 0–0.1)
IMM GRANULOCYTES NFR BLD AUTO: 0 %
INTERPRETATION, 910389: NORMAL
LDLC SERPL CALC-MCNC: 156 MG/DL (ref 0–99)
LYMPHOCYTES # BLD AUTO: 1.2 X10E3/UL (ref 0.7–3.1)
LYMPHOCYTES NFR BLD AUTO: 34 %
MCH RBC QN AUTO: 32.7 PG (ref 26.6–33)
MCHC RBC AUTO-ENTMCNC: 34.4 G/DL (ref 31.5–35.7)
MCV RBC AUTO: 95 FL (ref 79–97)
MONOCYTES # BLD AUTO: 0.6 X10E3/UL (ref 0.1–0.9)
MONOCYTES NFR BLD AUTO: 16 %
NEUTROPHILS # BLD AUTO: 1.3 X10E3/UL (ref 1.4–7)
NEUTROPHILS NFR BLD AUTO: 38 %
PLATELET # BLD AUTO: 117 X10E3/UL (ref 150–450)
POTASSIUM SERPL-SCNC: 3.3 MMOL/L (ref 3.5–5.2)
PROT SERPL-MCNC: 6.3 G/DL (ref 6–8.5)
RBC # BLD AUTO: 5.14 X10E6/UL (ref 4.14–5.8)
SODIUM SERPL-SCNC: 142 MMOL/L (ref 134–144)
SPECIMEN STATUS REPORT, ROLRST: NORMAL
TRIGL SERPL-MCNC: 148 MG/DL (ref 0–149)
VLDLC SERPL CALC-MCNC: 30 MG/DL (ref 5–40)
WBC # BLD AUTO: 3.5 X10E3/UL (ref 3.4–10.8)

## 2020-01-16 ENCOUNTER — OFFICE VISIT (OUTPATIENT)
Dept: INTERNAL MEDICINE CLINIC | Age: 50
End: 2020-01-16

## 2020-01-16 VITALS
SYSTOLIC BLOOD PRESSURE: 138 MMHG | BODY MASS INDEX: 39.17 KG/M2 | WEIGHT: 273.6 LBS | OXYGEN SATURATION: 98 % | DIASTOLIC BLOOD PRESSURE: 90 MMHG | HEART RATE: 77 BPM | HEIGHT: 70 IN | TEMPERATURE: 98 F | RESPIRATION RATE: 14 BRPM

## 2020-01-16 DIAGNOSIS — I10 PRIMARY HYPERTENSION: ICD-10-CM

## 2020-01-16 DIAGNOSIS — F41.9 ANXIETY: ICD-10-CM

## 2020-01-16 DIAGNOSIS — E66.9 OBESITY (BMI 30-39.9): ICD-10-CM

## 2020-01-16 DIAGNOSIS — Z00.00 ROUTINE GENERAL MEDICAL EXAMINATION AT A HEALTH CARE FACILITY: Primary | ICD-10-CM

## 2020-01-16 DIAGNOSIS — R73.01 IMPAIRED FASTING GLUCOSE: ICD-10-CM

## 2020-01-16 RX ORDER — LORAZEPAM 0.5 MG/1
0.5 TABLET ORAL
Qty: 60 TAB | Refills: 2 | Status: SHIPPED | OUTPATIENT
Start: 2020-01-16 | End: 2021-08-31 | Stop reason: SDUPTHER

## 2020-01-16 NOTE — PATIENT INSTRUCTIONS
Health Maintenance Due Topic Date Due  
 DTaP/Tdap/Td series (1 - Tdap) 11/14/1980  COLONOSCOPY  11/14/1987  Shingrix Vaccine Age 50> (1 of 2) 11/14/2019

## 2020-01-16 NOTE — PROGRESS NOTES
José Miguel Scruggs presents today for   Chief Complaint   Patient presents with    Physical     ROOM  10       Is someone accompanying this pt? no    Is the patient using any DME equipment during OV? no    Depression Screening:  3 most recent PHQ Screens 1/16/2020   Little interest or pleasure in doing things Not at all   Feeling down, depressed, irritable, or hopeless Not at all   Total Score PHQ 2 0       Learning Assessment:  Learning Assessment 1/16/2020   PRIMARY LEARNER Patient   HIGHEST LEVEL OF EDUCATION - PRIMARY LEARNER  > 4 YEARS OF COLLEGE   BARRIERS PRIMARY LEARNER NONE   CO-LEARNER CAREGIVER No   PRIMARY LANGUAGE ENGLISH   LEARNER PREFERENCE PRIMARY DEMONSTRATION   ANSWERED BY patient   RELATIONSHIP SELF       Abuse Screening:  Abuse Screening Questionnaire 1/16/2020   Do you ever feel afraid of your partner? N   Are you in a relationship with someone who physically or mentally threatens you? N   Is it safe for you to go home? Y       Fall Risk  No flowsheet data found. Health Maintenance reviewed and discussed and ordered per Provider. Health Maintenance Due   Topic Date Due    DTaP/Tdap/Td series (1 - Tdap) 11/14/1980    COLONOSCOPY  11/14/1987    Shingrix Vaccine Age 50> (1 of 2) 11/14/2019         Coordination of Care:  1. Have you been to the ER, urgent care clinic since your last visit? Hospitalized since your last visit? no    2. Have you seen or consulted any other health care providers outside of the [address] [place] [address] since your last visit? Include any pap smears or colon screening.  no

## 2020-01-16 NOTE — PROGRESS NOTES
Kvng Esqueda 1970, is a 47 y.o. male, who is seen today for a routine physical exam and follow-up on hypertension obesity anxiety. He has had more stress and anxiety attacks lately, sometimes as many as 3 in a week and lorazepam calms him down fairly quickly. He has had no side effects from this low-dose lorazepam.  He takes his medicine regularly but he has gained 10 pounds over the last 6 months. Past Medical History:   Diagnosis Date    Asthma     As a young child    Calculus of kidney     Hypertension     Sleep apnea     not using CPAP     Past Surgical History:   Procedure Laterality Date    HX HEENT      tonsillectomy    HX LITHOTRIPSY  10/9/2010    Left upper 3rd ureteral calculus    HX ORTHOPAEDIC      Left shoulder     Current Outpatient Medications   Medication Sig Dispense Refill    LORazepam (ATIVAN) 0.5 mg tablet Take 1 Tab by mouth every four (4) hours as needed for Anxiety. Max Daily Amount: 3 mg. 60 Tab 2    chlorthalidone (HYGROTEN) 25 mg tablet TAKE 1 TABLET BY MOUTH EVERY DAY 90 Tab 1    potassium chloride (KLOR-CON M20) 20 mEq tablet Take 20 mEq by mouth two (2) times daily as needed. Allergies   Allergen Reactions    Pcn [Penicillins] Not Reported This Time    Sulfa (Sulfonamide Antibiotics) Other (comments)     Social History     Socioeconomic History    Marital status:      Spouse name: Not on file    Number of children: Not on file    Years of education: Not on file    Highest education level: Not on file   Tobacco Use    Smoking status: Never Smoker    Smokeless tobacco: Never Used   Substance and Sexual Activity    Alcohol use:  Yes     Alcohol/week: 2.0 standard drinks     Types: 2 Cans of beer per week     Comment: occasionally     Drug use: No     Visit Vitals  /90 (BP 1 Location: Right arm, BP Patient Position: Sitting)   Pulse 77   Temp 98 °F (36.7 °C) (Oral)   Resp 14   Ht 5' 10\" (1.778 m)   Wt 273 lb 9.6 oz (124.1 kg)   SpO2 98%   BMI 39.26 kg/m²     Ear canals and tympanic membranes appear normal.  Oral cavity reveals no lesions. Neck reveals no adenopathy or thyromegaly. Carotids are 2+ without bruits. Lungs are clear to percussion. Good breath sounds with no wheezing or crackles. Heart reveals a regular rhythm with normal S1 and S2 no murmur apical impulse is not palpable. Abdomen is soft and nontender with no hepatosplenomegaly or masses and no bruits. Extremities reveal no clubbing cyanosis or edema. Pulses are 2+. Results for orders placed or performed in visit on 01/02/20   CBC/DIFF AMBIGUOUS DEFAULT   Result Value Ref Range    WBC 3.5 3.4 - 10.8 x10E3/uL    RBC 5.14 4.14 - 5.80 x10E6/uL    HGB 16.8 13.0 - 17.7 g/dL    HCT 48.8 37.5 - 51.0 %    MCV 95 79 - 97 fL    MCH 32.7 26.6 - 33.0 pg    MCHC 34.4 31.5 - 35.7 g/dL    RDW 12.9 11.6 - 15.4 %    PLATELET 155 (L) 489 - 450 x10E3/uL    NEUTROPHILS 38 Not Estab. %    Lymphocytes 34 Not Estab. %    MONOCYTES 16 Not Estab. %    EOSINOPHILS 11 Not Estab. %    BASOPHILS 1 Not Estab. %    ABS. NEUTROPHILS 1.3 (L) 1.4 - 7.0 x10E3/uL    Abs Lymphocytes 1.2 0.7 - 3.1 x10E3/uL    ABS. MONOCYTES 0.6 0.1 - 0.9 x10E3/uL    ABS. EOSINOPHILS 0.4 0.0 - 0.4 x10E3/uL    ABS. BASOPHILS 0.0 0.0 - 0.2 x10E3/uL    IMMATURE GRANULOCYTES 0 Not Estab. %    ABS. IMM. GRANS. 0.0 0.0 - 0.1 H70U1/EX   METABOLIC PANEL, COMPREHENSIVE   Result Value Ref Range    Glucose 111 (H) 65 - 99 mg/dL    BUN 17 6 - 24 mg/dL    Creatinine 1.08 0.76 - 1.27 mg/dL    GFR est non-AA 80 >59 mL/min/1.73    GFR est AA 92 >59 mL/min/1.73    BUN/Creatinine ratio 16 9 - 20    Sodium 142 134 - 144 mmol/L    Potassium 3.3 (L) 3.5 - 5.2 mmol/L    Chloride 98 96 - 106 mmol/L    CO2 24 20 - 29 mmol/L    Calcium 9.4 8.7 - 10.2 mg/dL    Protein, total 6.3 6.0 - 8.5 g/dL    Albumin 4.7 3.5 - 5.5 g/dL    GLOBULIN, TOTAL 1.6 1.5 - 4.5 g/dL    A-G Ratio 2.9 (H) 1.2 - 2.2    Bilirubin, total 0.5 0.0 - 1.2 mg/dL    Alk.  phosphatase 57 39 - 117 IU/L    AST (SGOT) 35 0 - 40 IU/L    ALT (SGPT) 58 (H) 0 - 44 IU/L   LIPID PANEL   Result Value Ref Range    Cholesterol, total 224 (H) 100 - 199 mg/dL    Triglyceride 148 0 - 149 mg/dL    HDL Cholesterol 38 (L) >39 mg/dL    VLDL, calculated 30 5 - 40 mg/dL    LDL, calculated 156 (H) 0 - 99 mg/dL   CVD REPORT   Result Value Ref Range    INTERPRETATION Note    SPECIMEN STATUS REPORT   Result Value Ref Range    SPECIMEN STATUS REPORT COMMENT      Assessment and #1. Obesity up 10 more pounds, I talked to him at length about the importance of weight loss particularly in view of impaired fasting glucose so he will be working on that in the coming months. #2. Increase stress and anxiety attacks, for now prefers to continue lorazepam rather than using SSRI medication on a daily basis. I told him to call me if he changes his mind. #3. Hypertension fairly well controlled, he will continue chlorthalidone 25 mg daily and work harder on weight loss. #4.  Impaired fasting glucose, little change, better than a year ago. He will be working on weight loss and avoiding sweets in his diet. #5.  Dyslipidemia, ten-year risk is approximately 7%, therefore he does not require statin therapy but I told him that it is important for him to work very hard on weight loss or this risk will climb as he gets older. He agrees to have screening colonoscopy, he is going to check with his wife and plan to have that done through Dr. Rachid Miller office. Follow-up 6 months with no lab    Seth Meza MD FACP    Please note: This document has been produced using voice recognition software. Unrecognized errors in transcription may be present.

## 2020-04-21 RX ORDER — CHLORTHALIDONE 25 MG/1
TABLET ORAL
Qty: 90 TAB | Refills: 1 | Status: SHIPPED | OUTPATIENT
Start: 2020-04-21 | End: 2021-06-28 | Stop reason: SDUPTHER

## 2020-10-02 ENCOUNTER — OFFICE VISIT (OUTPATIENT)
Dept: CARDIOLOGY CLINIC | Age: 50
End: 2020-10-02
Payer: COMMERCIAL

## 2020-10-02 VITALS
OXYGEN SATURATION: 98 % | HEART RATE: 72 BPM | DIASTOLIC BLOOD PRESSURE: 102 MMHG | BODY MASS INDEX: 39.51 KG/M2 | HEIGHT: 70 IN | WEIGHT: 276 LBS | SYSTOLIC BLOOD PRESSURE: 142 MMHG

## 2020-10-02 DIAGNOSIS — R07.9 CHEST PAIN, UNSPECIFIED TYPE: ICD-10-CM

## 2020-10-02 DIAGNOSIS — R07.9 CHEST PAIN, UNSPECIFIED TYPE: Primary | ICD-10-CM

## 2020-10-02 PROCEDURE — 93000 ELECTROCARDIOGRAM COMPLETE: CPT | Performed by: INTERNAL MEDICINE

## 2020-10-02 PROCEDURE — 99205 OFFICE O/P NEW HI 60 MIN: CPT | Performed by: INTERNAL MEDICINE

## 2020-10-02 RX ORDER — ATORVASTATIN CALCIUM 40 MG/1
40 TABLET, FILM COATED ORAL DAILY
Qty: 30 TAB | Refills: 6 | Status: SHIPPED | OUTPATIENT
Start: 2020-10-02 | End: 2021-02-26 | Stop reason: SDUPTHER

## 2020-10-02 RX ORDER — DOXYCYCLINE 100 MG/1
100 CAPSULE ORAL DAILY
COMMUNITY
Start: 2020-09-29 | End: 2020-10-30 | Stop reason: ALTCHOICE

## 2020-10-02 RX ORDER — ASPIRIN 81 MG/1
81 TABLET ORAL DAILY
Qty: 30 TAB | Refills: 6 | Status: SHIPPED | OUTPATIENT
Start: 2020-10-02 | End: 2021-02-26 | Stop reason: SDUPTHER

## 2020-10-02 RX ORDER — METOPROLOL SUCCINATE 25 MG/1
25 TABLET, EXTENDED RELEASE ORAL DAILY
Qty: 30 TAB | Refills: 6 | Status: SHIPPED | OUTPATIENT
Start: 2020-10-02 | End: 2020-10-30 | Stop reason: SDUPTHER

## 2020-10-02 RX ORDER — NITROGLYCERIN 0.4 MG/1
0.4 TABLET SUBLINGUAL
Qty: 25 TAB | Refills: 3 | Status: SHIPPED | OUTPATIENT
Start: 2020-10-02

## 2020-10-02 NOTE — PATIENT INSTRUCTIONS
Start Aspirn 81 daily Nitro PRN as needed for Chest pain Start Toprol XL 25 mg 
Start Lipitor 40 mg daily Aspirin (By mouth) Aspirin (AS-pir-in) Treats pain, fever, and inflammation. May lower risk of heart attack and stroke. Brand Name(s): Ascriptin Regular Strength, Aspergum, Aspir Low, Aspirin Adult Low Dose, Aspirin Low Dose, Cherri Aspirin Children's, Cherri Aspirin Regimen, Cherri Extra Strength, Cherri Genuine Aspirin, Cherri Low Dose, Bufferin, Bufferin Low Dose, Durlaza, Ecotrin, Ecpirin There may be other brand names for this medicine. When This Medicine Should Not Be Used: This medicine is not right for everyone. Do not use it if you had an allergic reaction to aspirin or other NSAIDs, or if you have a history of asthma with nasal polyps and rhinitis. How to Use This Medicine:  
Delayed Release Capsule, Long Acting Capsule, Gum, Tablet, Chewable Tablet, Fizzy Tablet, Coated Tablet, Long Acting Tablet, 24 Hour Capsule · Your doctor will tell you how much medicine to use. Do not use more than directed. · It is best to take this medicine with food or milk. · Capsule, tablet, or coated tablet: Swallow whole. Do not crush, break, or chew it. · Chewable tablet: You may chew it completely or swallow it whole. · Gum: Chew completely to make sure you get as much medicine as possible. Drink a full glass (8 ounces) of water after chewing the gum. · Swallow the extended-release capsule whole. Do not crush, break, or chew it. Take the capsule with a full glass of water at the same time each day. · Follow the instructions on the medicine label if you are using this medicine without a prescription. · Missed dose: If you miss a dose of Durlaza, skip the missed dose and go back to your regular dosing schedule. Do not take extra medicine to make up for a missed dose. · Store the medicine in a closed container at room temperature, away from heat, moisture, and direct light. Drugs and Foods to Avoid: Ask your doctor or pharmacist before using any other medicine, including over-the-counter medicines, vitamins, and herbal products. · Some foods and medicines can affect how aspirin works. Tell your doctor if you are using any of the following: ¨ Dipyridamole, methotrexate, probenecid, sulfinpyrazone, ticlopidine ¨ Blood thinner (including clopidogrel, prasugrel, ticagrelor, warfarin) ¨ Blood pressure medicine ¨ Medicine to treat seizures (including phenytoin, valproic acid) ¨ NSAID pain or arthritis medicine (including celecoxib, diclofenac, ibuprofen, naproxen) ¨ Steroid medicine (including dexamethasone, hydrocortisone, methylprednisolone, prednisolone, prednisone) · Do not take Durlaza 2 hours before or 1 hour after you drink alcohol or take medicines that contain alcohol. Warnings While Using This Medicine: · Tell your doctor if you are pregnant or breastfeeding. Do not use this medicine during the later part of a pregnancy unless your doctor tells you to. · Tell your doctor if you have kidney disease, liver disease, high blood pressure, heart disease, or a history of stomach bleeding or ulcers. · This medicine may increase your risk for bleeding, including stomach ulcers. · Do not give aspirin to a child or teenager who has chickenpox or flu symptoms, unless the doctor says it is okay. Aspirin can cause a life-threatening reaction called Reye syndrome. · Tell any doctor or dentist who treats you that you are using this medicine. This medicine may affect certain medical test results. · Keep all medicine out of the reach of children. Never share your medicine with anyone. Possible Side Effects While Using This Medicine:  
Call your doctor right away if you notice any of these side effects: · Allergic reaction: Itching or hives, swelling in your face or hands, swelling or tingling in your mouth or throat, chest tightness, trouble breathing · Bloody or black stools, bloody vomit or vomit that looks like coffee grounds · Chest tightness, wheezing · Ringing in the ears · Severe stomach pain · Unusual bleeding, bruising, or weakness If you notice other side effects that you think are caused by this medicine, tell your doctor. Call your doctor for medical advice about side effects. You may report side effects to FDA at 4-550-FDA-3893 © 2017 [address] Information is for End User's use only and may not be sold, redistributed or otherwise used for commercial purposes. The above information is an  only. It is not intended as medical advice for individual conditions or treatments. Talk to your doctor, nurse or pharmacist before following any medical regimen to see if it is safe and effective for you. Metoprolol (By mouth) Metoprolol (met-oh-PROE-lol) Treats high blood pressure, angina (chest pain), and heart failure. May lower the risk of death after a heart attack. This medicine is a beta-blocker. Brand Name(s): Lopressor, Toprol XL There may be other brand names for this medicine. When This Medicine Should Not Be Used: This medicine is not right for everyone. Do not use if you had an allergic reaction to metoprolol or similar medicines. Do not use this medicine if you have certain blood circulation or heart problems. Ask your doctor about these problems. How to Use This Medicine:  
Tablet, Long Acting Tablet · Take your medicine as directed. Your dose may need to be changed several times to find what works best for you. · Take this medicine with a meal or right after a meal. Take this medicine the same way every day, at the same time. · Swallow the tablet whole with a glass of water. You may break the extended-release tablet in half, but do not chew or crush it. · Missed dose: Take a dose as soon as you remember.  If it is almost time for your next dose, wait until then and take a regular dose. Do not take extra medicine to make up for a missed dose. · Store the medicine in a closed container at room temperature, away from heat, moisture, and direct light. Drugs and Foods to Avoid: Ask your doctor or pharmacist before using any other medicine, including over-the-counter medicines, vitamins, and herbal products. · Some medicines can affect how metoprolol works. Tell your doctor if you are taking any of the following: ¨ Digoxin, dipyridamole, hydralazine, hydroxychloroquine, methyldopa, quinidine ¨ Medicine to treat depression (such as bupropion, clomipramine, desipramine, fluoxetine, fluvoxamine, paroxetine, sertraline), medicine to treat mental illness (such as chlorpromazine, fluphenazine, haloperidol, thioridazine), medicine for heart rhythm problems (such as propafenone), HIV/AIDS medicine (such as ritonavir), medicine to treat a fungus infection (such as terbinafine), a monoamine oxidase inhibitor (MAOI), an ergot medicine for headaches, a calcium channel blocker (such as amlodipine, diltiazem, verapamil), or an alpha blocker (such as clonidine, prazosin, reserpine, guanethidine) Warnings While Using This Medicine: · Tell your doctor if you are pregnant or breastfeeding, or if you have blood vessel, heart, or circulation problems (such as heart failure, rhythm problems, or slow heartbeat). Tell your doctor if you have kidney disease, liver disease, diabetes, lung disease (such as asthma), an overactive thyroid, or a history of allergies. · This medicine may cause worse symptoms of heart failure while the dose is being adjusted. · Do not stop using this medicine suddenly. Your doctor will need to slowly decrease your dose before you stop it completely. · Tell any doctor or dentist who treats you that you are using this medicine. You may need to stop using this medicine several days before you have surgery or medical tests. · This medicine could lower your blood pressure too much, especially when you first use it or if you are dehydrated. Stand or sit up slowly if you feel lightheaded or dizzy. · This medicine may make you dizzy or drowsy. Do not drive or do anything else that could be dangerous until you know how this medicine affects you. · Your doctor will check your progress and the effects of this medicine at regular visits. Keep all appointments. · Keep all medicine out of the reach of children. Never share your medicine with anyone. Possible Side Effects While Using This Medicine:  
Call your doctor right away if you notice any of these side effects: · Allergic reaction: Itching or hives, swelling in your face or hands, swelling or tingling in your mouth or throat, chest tightness, trouble breathing · Lightheadedness, dizziness, or fainting · Slow heartbeat · Swelling in your hands, ankles, or feet, trouble breathing, tiredness · Worsening chest pain If you notice these less serious side effects, talk with your doctor: · Diarrhea · Mild dizziness or tiredness If you notice other side effects that you think are caused by this medicine, tell your doctor. Call your doctor for medical advice about side effects. You may report side effects to FDA at 0-729-FDA-9583 © 2017 [facility] Information is for End User's use only and may not be sold, redistributed or otherwise used for commercial purposes. The above information is an  only. It is not intended as medical advice for individual conditions or treatments. Talk to your doctor, nurse or pharmacist before following any medical regimen to see if it is safe and effective for you. Atorvastatin (By mouth) Atorvastatin (a-tor-va-STAT-in) Treats high cholesterol and triglyceride levels. Reduces the risk of angina, stroke, heart attack, or certain heart and blood vessel problems. This medicine is a statin. Brand Name(s): Lipitor There may be other brand names for this medicine. When This Medicine Should Not Be Used: This medicine is not right for everyone. Do not use it if you had an allergic reaction to atorvastatin, if you have active liver disease, or if you are pregnant or breastfeeding. How to Use This Medicine:  
Tablet · Take your medicine as directed. Your dose may need to be changed several times to find what works best for you. · Take this medicine at the same time each day. · Swallow the tablet whole. Do not break it. · Missed dose: Take a dose as soon as you remember. If it is less than 12 hours until your next dose, skip the missed dose and take the next dose at the regular time. Do not take 2 doses of this medicine within 12 hours. · Read and follow the patient instructions that come with this medicine. Talk to your doctor or pharmacist if you have any questions. · Store the medicine in a closed container at room temperature, away from heat, moisture, and direct light. Drugs and Foods to Avoid: Ask your doctor or pharmacist before using any other medicine, including over-the-counter medicines, vitamins, and herbal products. · Some medicines can affect how atorvastatin works. Tell your doctor if you also use birth control pills, boceprevir, cimetidine, colchicine, cyclosporine, digoxin, niacin, rifampin, spironolactone, telaprevir, medicine to treat an infection, or medicine to treat HIV/AIDS. Warnings While Using This Medicine: · It is not safe to take this medicine during pregnancy. It could harm an unborn baby. Tell your doctor right away if you become pregnant. · Tell your doctor if you have kidney disease, diabetes, muscle pain or weakness, thyroid problems, have recently had a stroke or TIA (transient ischemic attack), or have a history of liver disease. Tell your doctor if you drink grapefruit juice or drink alcohol regularly.  
· This medicine can cause muscle problems, which can lead to kidney problems. · Tell any doctor or dentist who treats you that you use this medicine. You may need to stop using it if you have surgery, have an injury, or develop serious health problems. · Your doctor will do lab tests at regular visits to check on the effects of this medicine. Keep all appointments. · Keep all medicine out of the reach of children. Never share your medicine with anyone. Possible Side Effects While Using This Medicine:  
Call your doctor right away if you notice any of these side effects: · Allergic reaction: Itching or hives, swelling in your face or hands, swelling or tingling in your mouth or throat, chest tightness, trouble breathing · Blistering, peeling, red skin rash · Change in how much or how often you urinate · Dark urine or pale stools, nausea, vomiting, loss of appetite, stomach pain, yellow skin or eyes · Fever · Muscle pain, tenderness, or weakness · Unusual tiredness If you notice these less serious side effects, talk with your doctor: · Diarrhea · Joint pain If you notice other side effects that you think are caused by this medicine, tell your doctor. Call your doctor for medical advice about side effects. You may report side effects to FDA at 0-227-FDA-6404 © 2017 [address]  Information is for End User's use only and may not be sold, redistributed or otherwise used for commercial purposes. The above information is an  only. It is not intended as medical advice for individual conditions or treatments. Talk to your doctor, nurse or pharmacist before following any medical regimen to see if it is safe and effective for you. Nitroglycerin (By mouth) Nitroglycerin (rla-qkcy-CBMU-er-in) Treats or prevents angina (chest pain). This medicine is a nitrate. Brand Name(s): Nitro-Time, Nitrostat There may be other brand names for this medicine. When This Medicine Should Not Be Used: This medicine is not right for everyone. Do not use it if you had an allergic reaction to nitroglycerin or similar medicines. How to Use This Medicine:  
Long Acting Capsule, Tablet, Long Acting Tablet · Your doctor will tell you how much medicine to use. Do not use more than directed. Sit down before you take this medicine, because it could make you lightheaded. · Most people use this medicine for only part of the day or as needed. · Extended-release capsule or extended-release tablet:  
¨ Take on an empty stomach with a full glass of water. ¨ Swallow whole. Do not crush, break, or chew it. · Buccal tablet:  
¨ Place it between your gum and upper cheek or upper lip. Let the tablet dissolve slowly in your mouth over several hours. Do not chew, crush, or swallow the tablet or put it under your tongue. ¨ Avoid drinking anything hot while the tablet is in your mouth and do not touch the tablet with your tongue. ¨ Do not go to sleep with a buccal tablet in your mouth. · Sublingual tablet:  
¨ Wet the tablet with saliva and place it under your tongue or inside your cheek. Let the tablet dissolve. Do not chew, crush, or swallow the tablet. Wait 5 minutes. If you still have pain, take a second tablet. Do not take more than 3 tablets in 15 minutes. If you still have pain after you take a total of 3 tablets, this is an emergency. Call 911. Do not drive yourself to the hospital. 
¨ You may use this medicine 5 to 10 minutes before an activity that can cause angina. This may help prevent the attack. · Read and follow the patient instructions that come with this medicine. Talk to your doctor or pharmacist if you have any questions. · Missed dose: Take a dose as soon as you remember. If it is almost time for your next dose, wait until then and take a regular dose. Do not take extra medicine to make up for a missed dose.  
· Store the medicine in a closed container at room temperature, away from heat, moisture, and direct light. Store the sublingual tablets at room temperature in the original glass container, away from heat, moisture, and direct light. How to Store and Dispose of This Medicine: · Store the medicine at room temperature in a closed container, away from heat, moisture, and direct light. Ask your pharmacist, doctor, or health caregiver about the best way to dispose of any outdated medicine or medicine no longer needed. · Keep all medicine out of the reach of children and never share your medicine with anyone. Drugs and Foods to Avoid: Ask your doctor or pharmacist before using any other medicine, including over-the-counter medicines, vitamins, and herbal products. · Do not use this medicine if you are also using avanafil, riociguat, sildenafil, tadalafil, or vardenafil. · Some medicines can affect how nitroglycerin works. Tell your doctor if you are using any of the following: ¨ Alteplase, aspirin, heparin ¨ Blood pressure medicine ¨ Diuretic (water pill) ¨ Ergot medicine · Tell your doctor if you are using any medicine that makes your mouth dry, such as medicines that treat depression. · Do not drink alcohol while you are using this medicine. Warnings While Using This Medicine: · Tell your doctor if you are pregnant or breastfeeding, or if you have kidney disease, anemia, congestive heart failure, enlarged heart, low blood pressure, or a recent heart attack. Tell your doctor if you have a history of a head injury. · This medicine may make you dizzy or lightheaded. Do not drive or do anything else that could be dangerous until you know how this medicine affects you. These symptoms may be worse if you drink alcohol. · Medicines that treat chest pain sometimes cause headaches when you first start using it. This is normal. Do not stop using the medicine or change the time you use it to avoid headaches.  Ask your doctor if you can take aspirin or acetaminophen to treat the headache. · Tell any doctor or dentist who treats you that you are using this medicine. This medicine may affect certain medical test results. · Keep all medicine out of the reach of children. Never share your medicine with anyone. Possible Side Effects While Using This Medicine:  
Call your doctor right away if you notice any of these side effects: · Allergic reaction: Itching or hives, swelling in your face or hands, swelling or tingling in your mouth or throat, chest tightness, trouble breathing · Blurred vision, dry mouth · Increased chest pain, fast or slow heartbeat · Severe or ongoing dizziness, lightheadedness, or fainting · Throbbing, severe, or ongoing headache, confusion, low fever, or trouble seeing · Trouble breathing, cold sweat, blue skin, lips, or nails · Warmth or redness in your face, neck, arms, or upper chest 
If you notice these less serious side effects, talk with your doctor: · Nausea, vomiting, weakness If you notice other side effects that you think are caused by this medicine, tell your doctor. Call your doctor for medical advice about side effects. You may report side effects to FDA at 3-943-FDA-0011 © 2017 [address] Information is for End User's use only and may not be sold, redistributed or otherwise used for commercial purposes. The above information is an  only. It is not intended as medical advice for individual conditions or treatments. Talk to your doctor, nurse or pharmacist before following any medical regimen to see if it is safe and effective for you.

## 2020-10-02 NOTE — LETTER
10/2/20 Patient: Kathryn Parikh YOB: 1970 Date of Visit: 10/2/2020 Mariluz Grajeda MD 
[place] [address] VIA Facsimile: [phone] Dear Mariluz Grajeda MD, Thank you for referring Mr. Irina Maldonado to [facility] for evaluation. My notes for this consultation are attached. If you have questions, please do not hesitate to call me. I look forward to following your patient along with you. Sincerely, Artemio Hobbs MD

## 2020-10-02 NOTE — PROGRESS NOTES
[facility]    Mr. Anat Blanco is 41-year-old male with a history of hypertension, hyperlipidemia, obesity, sleep apnea    Patient is here today for initial cardiac evaluation. He denies any prior history of myocardial infarction or congestive heart failure. Patient has been experiencing some chest pain on and off for last 2 weeks. He was in usual state of health before that. He is working in [employer]. Lately at work he has been experiencing exertional chest tightness which last for 5 to 10 minutes. This has been happening only with exertion which radiated to the left arm with some tingling numbness. He occasionally felt short of breath and slightly diaphoretic with this symptoms. There was no nausea or vomiting. At rest he does not have any symptoms. This symptoms resolves after he stops exertion very quickly. He did go to urgent care and PCP and recommended him to follow-up with cardiology team.  At rest patient does not have any symptoms. He denies any prior history otherwise. He has occasional lower extremity edema  Denies any nausea, vomiting, abdominal pain, fever, chills, sputum production. No hematuria or other bleeding complaints    Past Medical History:   Diagnosis Date    Asthma     Calculus of kidney     HLD (hyperlipidemia)     Hypertension     Obesity     Sleep apnea     not using CPAP       Review of Systems:  Cardiac symptoms as noted above in HPI. All others negative. Denies fatigue, malaise, skin rash, joint pain, blurring vision, photophobia, neck pain, hemoptysis, chronic cough, nausea, vomiting, hematuria, burning micturition, BRBPR, chronic headaches. Current Outpatient Medications   Medication Sig    doxycycline (VIBRAMYCIN) 100 mg capsule 100 mg daily.  10 DAYS FOR PNEUMONIA    chlorthalidone (HYGROTEN) 25 mg tablet TAKE 1 TABLET BY MOUTH EVERY DAY    LORazepam (ATIVAN) 0.5 mg tablet Take 1 Tab by mouth every four (4) hours as needed for Anxiety. Max Daily Amount: 3 mg.  potassium chloride (KLOR-CON M20) 20 mEq tablet Take 20 mEq by mouth two (2) times daily as needed. No current facility-administered medications for this visit. Past Surgical History:   Procedure Laterality Date    HX HEENT      tonsillectomy    HX LITHOTRIPSY  10/9/2010    Left upper 3rd ureteral calculus    HX ORTHOPAEDIC      Left shoulder       Allergies and Sensitivities:  Allergies   Allergen Reactions    Pcn [Penicillins] Not Reported This Time    Sulfa (Sulfonamide Antibiotics) Other (comments)       Family History:  No family history on file. Social History:  Social History     Tobacco Use    Smoking status: Never Smoker    Smokeless tobacco: Never Used   Substance Use Topics    Alcohol use: Yes     Alcohol/week: 2.0 standard drinks     Types: 2 Cans of beer per week     Comment: occasionally     Drug use: No     He  reports that he has never smoked. He has never used smokeless tobacco.  He  reports current alcohol use of about 2.0 standard drinks of alcohol per week. Physical Exam:  BP Readings from Last 3 Encounters:   10/02/20 (!) 142/102   01/16/20 138/90   06/26/19 130/88         Pulse Readings from Last 3 Encounters:   10/02/20 72   01/16/20 77   06/26/19 70          Wt Readings from Last 3 Encounters:   10/02/20 125.2 kg (276 lb)   01/16/20 124.1 kg (273 lb 9.6 oz)   06/26/19 119.5 kg (263 lb 6.4 oz)       Constitutional: Oriented to person, place, and time. HENT: Head: Normocephalic and atraumatic. Eyes: Conjunctivae and extraocular motions are normal.   Neck: No JVD present. Carotid bruit is not appreciated. Cardiovascular: Regular rhythm. No murmur, gallop or rubs appreciated  Lung: Breath sounds normal. No respiratory distress. No ronchi or rales appreciated  Abdominal: No tenderness. No rebound and no guarding. Musculoskeletal: There is/1+ lower extremity edema.  No cynosis  Lymphadenopathy:  No cervical or supraclavicular adenopathy appriciated. Neurological: No gross motor deficit noted. Skin: No visible skin rash noted. No Ear discharge noted  Psychiatric: Normal mood and affect. Good distal pulse    LABS:   Lab Results   Component Value Date/Time    Sodium 142 01/10/2020 12:00 AM    Potassium 3.3 (L) 01/10/2020 12:00 AM    Chloride 98 01/10/2020 12:00 AM    CO2 24 01/10/2020 12:00 AM    Glucose 111 (H) 01/10/2020 12:00 AM    BUN 17 01/10/2020 12:00 AM    Creatinine 1.08 01/10/2020 12:00 AM     Lipids Latest Ref Rng & Units 1/10/2020 12/12/2018 10/17/2016   Chol, Total 100 - 199 mg/dL 224(H) 213(H) 210(H)   HDL >39 mg/dL 38(L) 39(L) 40   LDL 0 - 99 mg/dL 156(H) 141(H) 141(H)   Trig 0 - 149 mg/dL 148 167(H) 147   Some recent data might be hidden     Lab Results   Component Value Date/Time    ALT (SGPT) 58 (H) 01/10/2020 12:00 AM     No results found for: HBA1C, HGBE8, HTB6STIK, QHX9YOKZ  Lab Results   Component Value Date/Time    TSH 0.47 12/12/2018 08:23 AM       EKG  Sinus rhythm at 66 bpm.  No pathologic Q waves. No ST changes of ischemia. ECHO    STRESS TEST (EST, PHARM, NUC, ECHO etc)    CATHETERIZATION    IMPRESSION & PLAN:  Mr. Wes Aponte is 48-year-old male with multiple medical problem    Chest pain:  Patient symptoms as mentioned above are highly concerning for angina unless proven otherwise. He does not have any rest pain  He has multiple risk factors to have underlying coronary disease (hypertension, hyperlipidemia, age, male sex, obesity)  I would ask him to start aspirin 81 mg daily today. I will provide him sublingual nitroglycerin to be used as needed for chest pain. I am starting Toprol-XL 25 mg daily  Had a lengthy discussion with the patient regarding possibility of underlying CAD causing his symptoms.   I discussed medical management versus ischemia evaluation  Discussed regarding management strategy which includes medical management vs. Ischemia evaluation ( non-invasive vs. Invasive). Risk, benefit and alternatives of each strategy discussed in detail. Risk, benefit, complication of LHC and possible PCI ( including but not limited to bleeding, vascular trauma requiring surgery,  infection, heart failure, stroke, MI, emergent bypass surgery, severe allergic reactions, kidney failure, dialysis and death ) were discussed with patient. During the discussion, patient has elected to proceed with noninvasive evaluation  I have asked him to avoid exertional activity at work. I have asked him to go to the emergency department if he has any further chest pain despite use of nitroglycerin. Hypertension:  /100. He tells me his blood pressure always runs high  Currently on hydrochlorothiazide. I am going to ask him to start Toprol XL 25 mg daily  Will order echo to rule out hypertensive cardiovascular LV dysfunction    Hyperlipidemia:  Based on his cholesterol profile in January 2020, his 10-year risk of having ASCVD is 7.8%. Will start patient on Lipitor 40 mg daily. Side effect discussed    Obesity: He weighs 276 pounds with BMI of 40. Importance of diet and exercise discussed. I have asked him to hold onto any exercise program until cardiac work-up is finished. This plan was discussed with patient who is in agreement. Thank you for allowing me to participate in patient care. Please feel free to call me if you have any question or concern. Olivia Carbajal MD  Please note: This document has been produced using voice recognition software. Unrecognized errors in transcription may be present.

## 2020-10-21 ENCOUNTER — TELEPHONE (OUTPATIENT)
Dept: CARDIOLOGY CLINIC | Age: 50
End: 2020-10-21

## 2020-10-22 LAB
ECHO AO ROOT DIAM: 3.64 CM
ECHO LA AREA 4C: 23.11 CM2
ECHO LA VOL 2C: 60.37 ML (ref 18–58)
ECHO LA VOL 4C: 61.26 ML (ref 18–58)
ECHO LA VOL BP: 69.96 ML (ref 18–58)
ECHO LA VOL/BSA BIPLANE: 29.23 ML/M2 (ref 16–28)
ECHO LA VOLUME INDEX A2C: 25.22 ML/M2 (ref 16–28)
ECHO LA VOLUME INDEX A4C: 25.59 ML/M2 (ref 16–28)
ECHO LV E' LATERAL VELOCITY: 11.42 CM/S
ECHO LV E' SEPTAL VELOCITY: 7.31 CM/S
ECHO LV INTERNAL DIMENSION DIASTOLIC: 4.5 CM (ref 4.2–5.9)
ECHO LV INTERNAL DIMENSION SYSTOLIC: 3.11 CM
ECHO LV IVSD: 1.43 CM (ref 0.6–1)
ECHO LV MASS 2D: 257.8 G (ref 88–224)
ECHO LV MASS INDEX 2D: 107.7 G/M2 (ref 49–115)
ECHO LV POSTERIOR WALL DIASTOLIC: 1.44 CM (ref 0.6–1)
ECHO LVOT CARDIAC OUTPUT: 5.75 LITER/MINUTE
ECHO LVOT DIAM: 2.32 CM
ECHO LVOT PEAK GRADIENT: 3.68 MMHG
ECHO LVOT PEAK VELOCITY: 95.9 CM/S
ECHO LVOT SV: 85.6 ML
ECHO LVOT VTI: 20.23 CM
ECHO MV A VELOCITY: 63.07 CM/S
ECHO MV E DECELERATION TIME (DT): 251.36 MS
ECHO MV E VELOCITY: 67.6 CM/S
ECHO MV E/A RATIO: 1.07
ECHO MV E/E' LATERAL: 5.92
ECHO MV E/E' RATIO (AVERAGED): 7.58
ECHO MV E/E' SEPTAL: 9.25
ECHO PVEIN A DURATION: 112.09 MS
ECHO PVEIN A VELOCITY: 23.86 CM/S
ECHO RV TAPSE: 2.76 CM (ref 1.5–2)
ECHO TV REGURGITANT MAX VELOCITY: 229.88 CM/S
ECHO TV REGURGITANT PEAK GRADIENT: 21.14 MMHG
LVOT MG: 2.12 MMHG
STRESS TARGET HR: 170 BPM

## 2020-10-30 ENCOUNTER — OFFICE VISIT (OUTPATIENT)
Dept: CARDIOLOGY CLINIC | Age: 50
End: 2020-10-30
Payer: COMMERCIAL

## 2020-10-30 VITALS
HEIGHT: 70 IN | BODY MASS INDEX: 39.51 KG/M2 | SYSTOLIC BLOOD PRESSURE: 150 MMHG | DIASTOLIC BLOOD PRESSURE: 100 MMHG | HEART RATE: 71 BPM | WEIGHT: 276 LBS | OXYGEN SATURATION: 98 %

## 2020-10-30 DIAGNOSIS — R07.9 CHEST PAIN, UNSPECIFIED TYPE: Primary | ICD-10-CM

## 2020-10-30 DIAGNOSIS — I10 PRIMARY HYPERTENSION: ICD-10-CM

## 2020-10-30 DIAGNOSIS — R07.9 CHEST PAIN, UNSPECIFIED TYPE: ICD-10-CM

## 2020-10-30 PROCEDURE — 99215 OFFICE O/P EST HI 40 MIN: CPT | Performed by: INTERNAL MEDICINE

## 2020-10-30 RX ORDER — SODIUM CHLORIDE 9 MG/ML
1000 INJECTION, SOLUTION INTRAVENOUS CONTINUOUS
Status: CANCELLED | OUTPATIENT
Start: 2020-10-30

## 2020-10-30 RX ORDER — ISOSORBIDE DINITRATE 20 MG/1
20 TABLET ORAL 2 TIMES DAILY
Qty: 60 TAB | Refills: 6 | Status: SHIPPED | OUTPATIENT
Start: 2020-10-30 | End: 2020-12-04

## 2020-10-30 RX ORDER — SODIUM CHLORIDE 0.9 % (FLUSH) 0.9 %
5-40 SYRINGE (ML) INJECTION AS NEEDED
Status: CANCELLED | OUTPATIENT
Start: 2020-10-30

## 2020-10-30 RX ORDER — SODIUM CHLORIDE 0.9 % (FLUSH) 0.9 %
5-40 SYRINGE (ML) INJECTION EVERY 8 HOURS
Status: CANCELLED | OUTPATIENT
Start: 2020-10-30

## 2020-10-30 RX ORDER — ASPIRIN 325 MG
162 TABLET ORAL DAILY
Status: CANCELLED | OUTPATIENT
Start: 2020-10-30

## 2020-10-30 RX ORDER — METOPROLOL SUCCINATE 25 MG/1
25 TABLET, EXTENDED RELEASE ORAL 2 TIMES DAILY
Qty: 60 TAB | Refills: 6 | Status: SHIPPED | OUTPATIENT
Start: 2020-10-30 | End: 2020-12-04 | Stop reason: SDUPTHER

## 2020-10-30 NOTE — LETTER
10/30/2020 8:23 AM    Mr. Jorge Saravia [address] Joe Rai was seen in our office on 10/30/2020 for cardiac evaluation. Please feel free to contact our office if you have any questions regarding this patient.        Sincerely,          Naomi Tsang MD

## 2020-10-30 NOTE — PATIENT INSTRUCTIONS
Instructions Patients Name:  Miguel Zambrano 1. You are scheduled to have a left heart Cath on November 10, 2020  at    0100   pm.   Please check in at     12   pm  .  
 
2. Please go to [facility] and park in the outpatient parking lot that is located around to the back of the hospital and enter through the [employer] [facility] Once you enter through the [facility] check in with the  there. The  will either give you directions or assist you in getting to the cath holding area. 3. You are not to eat or drink anything after midnight the night before your procedure. Small sips of water to take your medications is ok. 4. If you are diabetic, do not take your insulin/sugar pill the morning of the procedure. 5. MEDICATION INSTRUCTIONS:   Please take your morning medications with the following special instructions: 
 
[x]          Please make sure to take your Blood pressure medication :  
 
[x]          Take your Aspirin and/or Plavix. 6. We encourage families to wait in the waiting room on the first floor while the procedure is being done. The Doctor will come out and talk with you as soon as the procedure is over. 7. There is the possibility that you may spend the night in the hospital, depending on the results of the procedure. This will be determined after the procedure is done. If angioplasty or stent is planned, you will stay at least one day. 8. If you or your family have any questions, please call our office Monday Friday, 9:00 a. m.4:30 p.m.,  At [phone], and ask to speak to one of the nurses.

## 2020-10-30 NOTE — PROGRESS NOTES
Leoncio Davison presents today for   Chief Complaint   Patient presents with    Palpitations     with exertion     Shortness of [address] preferred language for health care discussion is english/other. Is someone accompanying this pt? no    Is the patient using any DME equipment during [address]? no    Depression Screening:  3 most recent PHQ Screens 1/16/2020   Little interest or pleasure in doing things Not at all   Feeling down, depressed, irritable, or hopeless Not at all   Total Score PHQ 2 0       Learning Assessment:  Learning Assessment 1/16/2020   PRIMARY LEARNER Patient   HIGHEST LEVEL OF EDUCATION - PRIMARY LEARNER  > 4 YEARS OF COLLEGE   BARRIERS PRIMARY LEARNER NONE   CO-LEARNER CAREGIVER No   PRIMARY LANGUAGE ENGLISH   LEARNER PREFERENCE PRIMARY DEMONSTRATION   ANSWERED BY patient   RELATIONSHIP SELF       Abuse Screening:  Abuse Screening Questionnaire 1/16/2020   Do you ever feel afraid of your partner? N   Are you in a relationship with someone who physically or mentally threatens you? N   Is it safe for you to go home? Y       Fall Risk  Fall Risk Assessment, last 12 mths 10/2/2020   Able to walk? Yes   Fall in past 12 months? No       Pt currently taking Anticoagulant therapy? no    Coordination of Care:  1. Have you been to the ER, urgent care clinic since your last visit? Hospitalized since your last visit? no    2. Have you seen or consulted any other health care providers outside of the [address] [place] [address] since your last visit? Include any pap smears or colon screening.  no

## 2020-10-30 NOTE — LETTER
10/30/2020 8:54 AM        Lavernenoah Quispe Jr  [id number]  1970      Insurance: Dieter Souza # _____________________    Proc Date: Tues. 11/10                Proc Time:  1:00pm    Performing MD : Dr. Aiken Rival:  [facility]                                            PCP: Not Known    Scheduled with:  Sia/EMail                                                        Date:10/30/2020          HP: 10/30      EKG: ______    Labs:______  CXR: _______  Orders: 10/30        Special Instructions:  _____________________________________________________  ______________________________________________________________________  ______________________________________________________________________      Date Faxed:   ______________   Pages Faxed: ___________________        The materials enclosed with this facsimile transmission are private and confidential and are the property of the sender. If you are not the intended recipient, be advised that any unauthorized use, disclosure, copying, distribution, or the taking of any action in reliance on the contents of this telecopied information is strictly prohibited. If you have received this in error, please immediately notify the sender via telephone to arrange for return of the forwarded documentation.

## 2020-10-30 NOTE — LETTER
10/30/20    Patient: Kathryn Parikh   YOB: 1970   Date of Visit: 10/30/2020     Lambert [facility] [address] [place] [address] 66: [phone]    Dear Mariluz Grajeda MD,      Thank you for referring Mr. Irina Maldonado to [facility] for evaluation. My notes for this consultation are attached. If you have questions, please do not hesitate to call me. I look forward to following your patient along with you.       Sincerely,    Artemio Hobbs MD

## 2020-10-30 NOTE — PROGRESS NOTES
[facility]    Mr. Dakota Grace is 47 y.o. male with a history of hypertension, hyperlipidemia, obesity, sleep apnea    Patient is here today for follow-up appointment. He is continues to be very concerned with his symptoms of chest pain which is present with exertional activities and emotional excitement. He had several episode of exertional chest pain since last time. He has started wearing apple watch since last time and he tells me that whenever his heart rate which is above 115, 120 bpm with physical activity, he starts having chest pressure on the left side of the chest radiating to the left arm with some tingling and numbness. A1c stop exertional activity his pain and discomfort resolves less than 5 minutes. This has been happening only with exertional activity and he is not being able to perform activity he would like to perform. He also coaches baseball team and he has noticed that walking and running around the field with emotional excitement he has noticed this chest tightness with left arm numbness. He underwent stress test when he exercised for 7 minutes and he started having 7 out of 10 chest pain it has to be terminated. Denies any nausea, vomiting, abdominal pain, fever, chills, sputum production. No hematuria or other bleeding complaints    Past Medical History:   Diagnosis Date    Asthma     Calculus of kidney     HLD (hyperlipidemia)     Hypertension     Obesity     Sleep apnea     not using CPAP       Review of Systems:  Cardiac symptoms as noted above in HPI. All others negative. Denies fatigue, malaise, skin rash, joint pain, blurring vision, photophobia, neck pain, hemoptysis, chronic cough, nausea, vomiting, hematuria, burning micturition, BRBPR, chronic headaches. Current Outpatient Medications   Medication Sig    aspirin delayed-release 81 mg tablet Take 1 Tab by mouth daily.     nitroglycerin (NITROSTAT) 0.4 mg SL tablet 1 Tab by SubLINGual route every five (5) minutes as needed for Chest Pain. Up to 3 doses.  metoprolol succinate (TOPROL-XL) 25 mg XL tablet Take 1 Tab by mouth daily.  atorvastatin (LIPITOR) 40 mg tablet Take 1 Tab by mouth daily.  chlorthalidone (HYGROTEN) 25 mg tablet TAKE 1 TABLET BY MOUTH EVERY DAY    LORazepam (ATIVAN) 0.5 mg tablet Take 1 Tab by mouth every four (4) hours as needed for Anxiety. Max Daily Amount: 3 mg.  potassium chloride (KLOR-CON M20) 20 mEq tablet Take 20 mEq by mouth two (2) times daily as needed. No current facility-administered medications for this visit. Past Surgical History:   Procedure Laterality Date    HX HEENT      tonsillectomy    HX LITHOTRIPSY  10/9/2010    Left upper 3rd ureteral calculus    HX ORTHOPAEDIC      Left shoulder       Allergies and Sensitivities:  Allergies   Allergen Reactions    Pcn [Penicillins] Not Reported This Time    Sulfa (Sulfonamide Antibiotics) Other (comments)       Family History:  No family history on file. Social History:  Social History     Tobacco Use    Smoking status: Never Smoker    Smokeless tobacco: Never Used   Substance Use Topics    Alcohol use: Yes     Alcohol/week: 2.0 standard drinks     Types: 2 Cans of beer per week     Comment: occasionally     Drug use: No     He  reports that he has never smoked. He has never used smokeless tobacco.  He  reports current alcohol use of about 2.0 standard drinks of alcohol per week. Physical Exam:  BP Readings from Last 3 Encounters:   10/30/20 (!) 150/100   10/22/20 (!) 142/102   10/22/20 (!) 160/100         Pulse Readings from Last 3 Encounters:   10/30/20 71   10/02/20 72   01/16/20 77          Wt Readings from Last 3 Encounters:   10/30/20 125.2 kg (276 lb)   10/22/20 125.2 kg (276 lb)   10/22/20 125.2 kg (276 lb)       Constitutional: Oriented to person, place, and time. HENT: Head: Normocephalic and atraumatic  Neck: No JVD present. Carotid bruit is not appreciated. Cardiovascular: Regular rhythm. No murmur, gallop or rubs appreciated  Lung: Breath sounds normal. No respiratory distress. No ronchi or rales appreciated  Abdominal: No tenderness. No rebound and no guarding. Musculoskeletal: There is trace lower extremity edema. No cynosis    LABS:   Lab Results   Component Value Date/Time    Sodium 142 01/10/2020 12:00 AM    Potassium 3.3 (L) 01/10/2020 12:00 AM    Chloride 98 01/10/2020 12:00 AM    CO2 24 01/10/2020 12:00 AM    Glucose 111 (H) 01/10/2020 12:00 AM    BUN 17 01/10/2020 12:00 AM    Creatinine 1.08 01/10/2020 12:00 AM     Lipids Latest Ref Rng & Units 1/10/2020 12/12/2018 10/17/2016   Chol, Total 100 - 199 mg/dL 224(H) 213(H) 210(H)   HDL >39 mg/dL 38(L) 39(L) 40   LDL 0 - 99 mg/dL 156(H) 141(H) 141(H)   Trig 0 - 149 mg/dL 148 167(H) 147   Some recent data might be hidden     Lab Results   Component Value Date/Time    ALT (SGPT) 58 (H) 01/10/2020 12:00 AM     No results found for: HBA1C, HGBE8, SNH6ADOY, INL9GUFO, GZI9TQQI  Lab Results   Component Value Date/Time    TSH 0.47 12/12/2018 08:23 AM       EKG  Sinus rhythm at 66 bpm.  No pathologic Q waves. No ST changes of ischemia. ECHO (09/20)  Left Ventricle  Normal cavity size and systolic function (ejection fraction normal). Moderately increased wall thickness. Wall motion: normal. The estimated EF is 55 - 60%. Visually measured ejection fraction. There is mild (grade 1) left ventricular diastolic dysfunction. Wall Scoring  The left ventricular wall motion is normal.             Left Atrium  Normal cavity size. Left Atrium volume index is 29 mL/m2. Right Ventricle  Normal global systolic function. Mildly dilated right ventricle. Right Atrium  Mildly dilated right atrium. Aortic Valve  Normal valve structure, trileaflet valve structure, no stenosis and no regurgitation. Mitral Valve  Normal valve structure and no stenosis. Trace regurgitation.     Tricuspid Valve  Normal valve structure and no stenosis. Trace regurgitation. Pulmonic Valve  Pulmonic valve normal doppler findings. Normal valve structure and no stenosis. Mild regurgitation. Aorta  Mildly dilated aortic root; diameter is 3.6 cm. Pulmonary Artery  Pulmonary arterial systolic pressure (PASP) is 29 mmHg. Pulmonary hypertension not suggested by Doppler findings. STRESS TEST (010/20)  · Baseline ECG: Normal sinus rhythm. · Inconclusive stress test.  · Moderate risk Melendez treadmill score. And exercise for 7 minutes with heart rate achieved 120 bpm when patient started having 7 out of 10 chest pain with EKG changes. Post exercise nuclear images was not performed as he was not able to achieve target heart rate    CATHETERIZATION    IMPRESSION & PLAN:  Mr. Minoo Verdugo is 58-year-old male with multiple medical problem    Chest pain:  Patient symptoms as mentioned above are highly concerning for angina unless proven otherwise. He does not have any rest pain  He has multiple risk factors to have underlying coronary disease (hypertension, hyperlipidemia, age, male sex, obesity)  Patient was scheduled for nuclear stress test as mentioned above however as patient did not reach target heart rate, post exercise images were not performed  After 7 minutes of exercise when the heart rate achieved was 120 bpm, patient had 7 out of 10 left-sided chest discomfort with left arm tingling and numbness and he had to stop exercise  The symptoms are bothersome and affecting his lifestyle. This is highly concerning for angina  Discussed regarding management strategy which includes medical management vs. Ischemia evaluation ( non-invasive vs. Invasive). Risk, benefit and alternatives of each strategy discussed in detail.   Risk, benefit, complication of LHC and possible PCI ( including but not limited to bleeding, vascular trauma requiring surgery,  infection, heart failure, stroke, MI, emergent bypass surgery, severe allergic reactions, kidney failure, dialysis and death ) were discussed with patient and willing to proceed with procedure. Will be using moderate sedation   By stating these are possible risks, this does not exclude the potential for additional risks not named here. Advised patient to avoid any exertional activity until then    Hypertension:  /100. Currently on Toprol and chlorthalidone. Will increase Toprol dose to 25 mg twice daily  Also starting Isordil 5 mg twice daily    Hyperlipidemia:  Based on his cholesterol profile in January 2020, his 10-year risk of having ASCVD is 7.8%. Added atorvastatin 40 mg daily in September 2020. Repeat fasting lipid profile in 3 months    Obesity: He weighs 276 pounds with BMI of 40. Importance of diet and exercise discussed but I have asked him to hold onto any exercise program until cardiac work-up is finished. This plan was discussed with patient who is in agreement. Thank you for allowing me to participate in patient care. Please feel free to call me if you have any question or concern. Khari Corrales MD  Please note: This document has been produced using voice recognition software. Unrecognized errors in transcription may be present.

## 2020-11-03 ENCOUNTER — HOSPITAL ENCOUNTER (OUTPATIENT)
Dept: LAB | Age: 50
Discharge: HOME | End: 2020-11-03

## 2020-11-03 ENCOUNTER — HOSPITAL ENCOUNTER (OUTPATIENT)
Dept: GENERAL RADIOLOGY | Age: 50
Discharge: HOME | End: 2020-11-03
Payer: COMMERCIAL

## 2020-11-03 LAB — SENTARA SPECIMEN COL,SENBCF: NORMAL

## 2020-11-03 PROCEDURE — 71046 X-RAY EXAM CHEST 2 VIEWS: CPT

## 2020-11-03 PROCEDURE — 99001 SPECIMEN HANDLING PT-LAB: CPT

## 2020-11-04 ENCOUNTER — TELEPHONE (OUTPATIENT)
Dept: CARDIOLOGY CLINIC | Age: 50
End: 2020-11-04

## 2020-11-04 LAB
A-G RATIO,AGRAT: 2.6 RATIO (ref 1.1–2.6)
ABSOLUTE LYMPHOCYTE COUNT, 10803: 1.3 K/UL (ref 1–4.8)
ALBUMIN SERPL-MCNC: 4.5 G/DL (ref 3.5–5)
ALP SERPL-CCNC: 52 U/L (ref 25–115)
ALT SERPL-CCNC: 48 U/L (ref 5–40)
ANION GAP SERPL CALC-SCNC: 10.4 MMOL/L (ref 3–15)
AST SERPL W P-5'-P-CCNC: 34 U/L (ref 10–37)
BASOPHILS # BLD: 0 K/UL (ref 0–0.2)
BASOPHILS NFR BLD: 1 % (ref 0–2)
BILIRUB SERPL-MCNC: 0.6 MG/DL (ref 0.2–1.2)
BUN SERPL-MCNC: 14 MG/DL (ref 6–22)
CALCIUM SERPL-MCNC: 9.5 MG/DL (ref 8.4–10.5)
CHLORIDE SERPL-SCNC: 98 MMOL/L (ref 98–110)
CO2 SERPL-SCNC: 32 MMOL/L (ref 20–32)
CREAT SERPL-MCNC: 0.9 MG/DL (ref 0.5–1.2)
EOSINOPHIL # BLD: 0.4 K/UL (ref 0–0.5)
EOSINOPHIL NFR BLD: 10 % (ref 0–6)
ERYTHROCYTE [DISTWIDTH] IN BLOOD BY AUTOMATED COUNT: 12.6 % (ref 10–15.5)
GFRAA, 66117: >60
GFRNA, 66118: >60
GLOBULIN,GLOB: 1.7 G/DL (ref 2–4)
GLUCOSE SERPL-MCNC: 86 MG/DL (ref 70–99)
GRANULOCYTES,GRANS: 41 % (ref 40–75)
HCT VFR BLD AUTO: 44.7 % (ref 39.3–51.6)
HGB BLD-MCNC: 15.6 G/DL (ref 13.1–17.2)
INR PPP: 1.08 (ref 0.89–1.29)
LYMPHOCYTES, LYMLT: 30 % (ref 20–45)
MCH RBC QN AUTO: 34 PG (ref 26–34)
MCHC RBC AUTO-ENTMCNC: 35 G/DL (ref 31–36)
MCV RBC AUTO: 97 FL (ref 80–95)
MONOCYTES # BLD: 0.8 K/UL (ref 0.1–1)
MONOCYTES NFR BLD: 18 % (ref 3–12)
NEUTROPHILS # BLD AUTO: 1.8 K/UL (ref 1.8–7.7)
PLATELET # BLD AUTO: 112 K/UL (ref 140–440)
PMV BLD AUTO: 10.2 FL (ref 9–13)
POTASSIUM SERPL-SCNC: 3.2 MMOL/L (ref 3.5–5.5)
PROT SERPL-MCNC: 6.2 G/DL (ref 6.4–8.3)
PROTHROMBIN TIME: 11.7 SEC (ref 9–13)
RBC # BLD AUTO: 4.61 M/UL (ref 3.8–5.8)
SODIUM SERPL-SCNC: 140 MMOL/L (ref 133–145)
WBC # BLD AUTO: 4.3 K/UL (ref 4–11)

## 2020-11-04 NOTE — TELEPHONE ENCOUNTER
----- Message from Naomi Tsang MD sent at 11/4/2020  2:37 PM EST -----  Please tell him his lab results.    K 3.2 so ask him to take eat some banana   ----- Message -----  From: Tala Yip In  Sent: 11/4/2020   8:08 AM EST  To: Naomi Tsang MD

## 2020-11-04 NOTE — TELEPHONE ENCOUNTER
Attempted to contact pt at  number, no answer. Lvm for pt to return call to office at [phone]. Will continue to try to contact pt.

## 2020-11-05 ENCOUNTER — DOCUMENTATION ONLY (OUTPATIENT)
Dept: CARDIOLOGY CLINIC | Age: 50
End: 2020-11-05

## 2020-11-05 NOTE — PROGRESS NOTES
[employer] Prior Authorization For Left Cath On 11/10/20 With Dr. Dona Rankin. #  [id number]    Valid:  11/10/20 to 12/9/20

## 2020-11-09 NOTE — TELEPHONE ENCOUNTER
Contacted pt at [facility] number. Two patient Identifiers confirmed. Advised pt per Dr Antonio Wayne. Pt verbalized understanding and stated he started back taking potassium 20 meq.

## 2020-11-10 ENCOUNTER — HOSPITAL ENCOUNTER (OUTPATIENT)
Age: 50
Setting detail: OUTPATIENT SURGERY
Discharge: HOME | End: 2020-11-10
Attending: INTERNAL MEDICINE | Admitting: INTERNAL MEDICINE
Payer: COMMERCIAL

## 2020-11-10 VITALS
HEIGHT: 70 IN | DIASTOLIC BLOOD PRESSURE: 62 MMHG | HEART RATE: 67 BPM | SYSTOLIC BLOOD PRESSURE: 138 MMHG | BODY MASS INDEX: 37.94 KG/M2 | OXYGEN SATURATION: 99 % | WEIGHT: 265 LBS | RESPIRATION RATE: 18 BRPM

## 2020-11-10 DIAGNOSIS — R07.9 CHEST PAIN: ICD-10-CM

## 2020-11-10 DIAGNOSIS — I20.0 UNSTABLE ANGINA PECTORIS (HCC): ICD-10-CM

## 2020-11-10 LAB — ACT BLD: 296 SECS (ref 79–138)

## 2020-11-10 PROCEDURE — 74011250636 HC RX REV CODE- 250/636: Performed by: INTERNAL MEDICINE

## 2020-11-10 PROCEDURE — 93454 CORONARY ARTERY ANGIO S&I: CPT | Performed by: INTERNAL MEDICINE

## 2020-11-10 PROCEDURE — 99152 MOD SED SAME PHYS/QHP 5/>YRS: CPT | Performed by: INTERNAL MEDICINE

## 2020-11-10 PROCEDURE — C1887 CATHETER, GUIDING: HCPCS | Performed by: INTERNAL MEDICINE

## 2020-11-10 PROCEDURE — 99153 MOD SED SAME PHYS/QHP EA: CPT | Performed by: INTERNAL MEDICINE

## 2020-11-10 PROCEDURE — 77030013797 HC KT TRNSDUC PRSSR EDWD -A: Performed by: INTERNAL MEDICINE

## 2020-11-10 PROCEDURE — C1769 GUIDE WIRE: HCPCS | Performed by: INTERNAL MEDICINE

## 2020-11-10 PROCEDURE — 74011000636 HC RX REV CODE- 636: Performed by: INTERNAL MEDICINE

## 2020-11-10 PROCEDURE — 77030015766: Performed by: INTERNAL MEDICINE

## 2020-11-10 PROCEDURE — 77030012597: Performed by: INTERNAL MEDICINE

## 2020-11-10 PROCEDURE — 77030013761 HC KT HRT LFT ANGI -B: Performed by: INTERNAL MEDICINE

## 2020-11-10 PROCEDURE — 93571 IV DOP VEL&/PRESS C FLO 1ST: CPT | Performed by: INTERNAL MEDICINE

## 2020-11-10 PROCEDURE — C1894 INTRO/SHEATH, NON-LASER: HCPCS | Performed by: INTERNAL MEDICINE

## 2020-11-10 PROCEDURE — 77030012468 HC VLV BLEEDBK CNTRL ABBT -B: Performed by: INTERNAL MEDICINE

## 2020-11-10 PROCEDURE — 85347 COAGULATION TIME ACTIVATED: CPT

## 2020-11-10 PROCEDURE — 74011000250 HC RX REV CODE- 250: Performed by: INTERNAL MEDICINE

## 2020-11-10 PROCEDURE — 77030029997 HC DEV COM RDL R BND TELE -B: Performed by: INTERNAL MEDICINE

## 2020-11-10 RX ORDER — FENTANYL CITRATE 50 UG/ML
INJECTION, SOLUTION INTRAMUSCULAR; INTRAVENOUS AS NEEDED
Status: DISCONTINUED | OUTPATIENT
Start: 2020-11-10 | End: 2020-11-10 | Stop reason: HOSPADM

## 2020-11-10 RX ORDER — MIDAZOLAM HYDROCHLORIDE 1 MG/ML
INJECTION, SOLUTION INTRAMUSCULAR; INTRAVENOUS AS NEEDED
Status: DISCONTINUED | OUTPATIENT
Start: 2020-11-10 | End: 2020-11-10 | Stop reason: HOSPADM

## 2020-11-10 RX ORDER — SODIUM CHLORIDE 9 MG/ML
1000 INJECTION, SOLUTION INTRAVENOUS CONTINUOUS
Status: DISCONTINUED | OUTPATIENT
Start: 2020-11-10 | End: 2020-11-10

## 2020-11-10 RX ORDER — LIDOCAINE HYDROCHLORIDE 10 MG/ML
INJECTION INFILTRATION; PERINEURAL AS NEEDED
Status: DISCONTINUED | OUTPATIENT
Start: 2020-11-10 | End: 2020-11-10 | Stop reason: HOSPADM

## 2020-11-10 RX ORDER — ASPIRIN 325 MG
162 TABLET ORAL DAILY
Status: DISCONTINUED | OUTPATIENT
Start: 2020-11-11 | End: 2020-11-10 | Stop reason: HOSPADM

## 2020-11-10 RX ORDER — VERAPAMIL HYDROCHLORIDE 2.5 MG/ML
INJECTION, SOLUTION INTRAVENOUS AS NEEDED
Status: DISCONTINUED | OUTPATIENT
Start: 2020-11-10 | End: 2020-11-10 | Stop reason: HOSPADM

## 2020-11-10 RX ORDER — SODIUM CHLORIDE 0.9 % (FLUSH) 0.9 %
5-40 SYRINGE (ML) INJECTION AS NEEDED
Status: DISCONTINUED | OUTPATIENT
Start: 2020-11-10 | End: 2020-11-10 | Stop reason: HOSPADM

## 2020-11-10 RX ORDER — HEPARIN SODIUM 1000 [USP'U]/ML
INJECTION, SOLUTION INTRAVENOUS; SUBCUTANEOUS AS NEEDED
Status: DISCONTINUED | OUTPATIENT
Start: 2020-11-10 | End: 2020-11-10 | Stop reason: HOSPADM

## 2020-11-10 RX ORDER — SODIUM CHLORIDE 0.9 % (FLUSH) 0.9 %
5-40 SYRINGE (ML) INJECTION EVERY 8 HOURS
Status: DISCONTINUED | OUTPATIENT
Start: 2020-11-10 | End: 2020-11-10 | Stop reason: HOSPADM

## 2020-11-10 NOTE — Clinical Note
TRANSFER - IN REPORT:     Verbal report received from: Princess Roberto RN. Report consisted of patient's Situation, Background, Assessment and   Recommendations(SBAR). Opportunity for questions and clarification was provided. Assessment completed upon patient's arrival to unit and care assumed. Patient transported with a Cardiac Cath Tech / Patient Care Tech.

## 2020-11-10 NOTE — ROUTINE PROCESS
1402 Cath holding summary Patient escorted to cath holding from waiting area ambulatory, alert and oriented x 4, voicing no complaints. Changed into gown and placed on monitor. NPO since MN. Lab results, med rec and H&P reviewed on chart. PIV x 2 inserted without difficulty. 1440 TRANSFER - OUT REPORT: 
 
Verbal report given to Mignon Kelley (name) on Diane Lockhart  being transferred to Cath Lab (unit) for ordered procedure Report consisted of patients Situation, Background, Assessment and  
Recommendations(SBAR). Information from the following report(s) SBAR, Kardex, Intake/Output, MAR and Procedure Verification was reviewed with the receiving nurse. Lines:  
Peripheral IV 10/22/20 Left Antecubital (Active) Opportunity for questions and clarification was provided. Patient transported with: 
 [address] REPORT: 
 
Verbal report received from Maxim Oliverioparul (name) on Diane Lockhart  being received from Cath Lab (unit) for ordered procedure Report consisted of patients Situation, Background, Assessment and  
Recommendations(SBAR). Information from the following report(s) SBAR, Kardex, Procedure Summary, Intake/Output and MAR was reviewed with the receiving nurse. Opportunity for questions and clarification was provided. Assessment completed upon patients arrival to unit and care assumed. 1800 TR Band removed from R wrist. No bleeding or hematoma noted. Joe Garb and tegaderm in place. Dressing is clean, dry, and intact. Patient has no complaint of pain at this time. Bedrest instructions discussed with patient, patient verbalized understanding. 1900 AVS Discharge instructions reviewed with patient and copy given to patient. All questions answered. Patient verbalized understanding to all discharge instructions. PIV removed. Procedural site within normal limits. No hematoma or bleeding noted from procedural and PIV site.  No pain noted at discharge. Patient discharged with support person in stable condition. Escorted out to vehicle for transport home.

## 2020-11-10 NOTE — DISCHARGE INSTRUCTIONS
Cardiac Catheterization/Angiography Discharge Instructions    *Check the puncture site frequently for swelling or bleeding. If you see any bleeding, lie down and apply pressure over the area with a clean town or washcloth. Notify your doctor for any redness, swelling, drainage or oozing from the puncture site. Notify your doctor for any fever or chills. *If the leg or arm with the puncture becomes cold, numb or painful, call Dr. Brandyn Santos at  [phone]. *Activity should be limited for the next 48 hours. Climb stairs as little as possible and avoid any stooping, bending or strenuous activity for 48 hours. No heavy lifting (anything over 10 pounds) for three days. *Do not drive for 48 hours. *You may resume your usual diet. Drink more fluids than usual.    *Have a responsible person drive you home and stay with you for at least 24 hours after your heart catheterization/angiography. *You may remove the bandage from your {ARM/GROIN:70521} in 24 hours. You may shower in 24 hours. No tub baths, hot tubs or swimming for one week. Do not place any lotions, creams, powders, ointments over the puncture site for one week. You may place a clean band-aid over the puncture site each day for 5 days. Change this daily. DISCHARGE SUMMARY from Nurse    PATIENT INSTRUCTIONS:    After general anesthesia or intravenous sedation, for 24 hours or while taking prescription Narcotics:  · Limit your activities  · Do not drive and operate hazardous machinery  · Do not make important personal or business decisions  · Do  not drink alcoholic beverages  · If you have not urinated within 8 hours after discharge, please contact your surgeon on call.     Report the following to your surgeon:  · Excessive pain, swelling, redness or odor of or around the surgical area  · Temperature over 100.5  · Nausea and vomiting lasting longer than 4 hours or if unable to take medications  · Any signs of decreased circulation or nerve impairment to extremity: change in color, persistent  numbness, tingling, coldness or increase pain  · Any questions    What to do at Home:  Recommended activity: Activity as tolerated and no driving for today. If you experience any of the following symptoms pain not relieved by over the counter pain medication, please follow up with Dr. Saray Mauricio at [phone]. *  Please give a list of your current medications to your Primary Care Provider. *  Please update this list whenever your medications are discontinued, doses are      changed, or new medications (including over-the-counter products) are added. *  Please carry medication information at all times in case of emergency situations. These are general instructions for a healthy lifestyle:    No smoking/ No tobacco products/ Avoid exposure to second hand smoke  Surgeon General's Warning:  Quitting smoking now greatly reduces serious risk to your health. Obesity, smoking, and sedentary lifestyle greatly increases your risk for illness    A healthy diet, regular physical exercise & weight monitoring are important for maintaining a healthy lifestyle    You may be retaining fluid if you have a history of heart failure or if you experience any of the following symptoms:  Weight gain of 3 pounds or more overnight or 5 pounds in a week, increased swelling in our hands or feet or shortness of breath while lying flat in bed. Please call your doctor as soon as you notice any of these symptoms; do not wait until your next office visit. The discharge information has been reviewed with the patient. The patient verbalized understanding. Discharge medications reviewed with the patient and appropriate educational materials and side effects teaching were provided.   ___________________________________________________________________________________________________________________________________

## 2020-11-10 NOTE — Clinical Note
Contrast Dose Calculator:   Patient's age: 47.   Patient's sex: Male. Patient weight (kg) = 120. Creatinine level (mg/dL) = 0.9. Creatinine clearance (mL/min): 167. Max Contrast dose per Creatinine Cl calculator = 375.75 mL.

## 2020-11-10 NOTE — H&P
Please see clinic note for detail. I saw and examined patient and confirmed above. No interval change. Labs reviewed. Procedure explained to patient and all risk and benefit discussed with patient. Risk, benefit, complication of LHC and possible PCI ( including but not limited to bleeding, infection, heart failure, stroke, MI, emergent bypass surgery, kidney failure, dialysis and death ) were discussed with patient and willing to proceed with procedure. Talked to wife on phone as well  Proceed as planned. History and physical has been reviewed.  There have been no significant clinical changes since the completion of the originally dated History and Physical.  Will be using moderate sedation.    ------------------------------------------------------------------------------------------------------------------

## 2020-11-10 NOTE — Clinical Note
TRANSFER - OUT REPORT:     Verbal report given to: Renee Heredia RN. Report consisted of patient's Situation, Background, Assessment and   Recommendations(SBAR). Opportunity for questions and clarification was provided. Patient transported with a Cardiac Cath Tech / Patient Care Tech. Patient transported to: [address].

## 2020-11-10 NOTE — Clinical Note
Right groin and right radial prepped with ChloraPrep and draped. Wet prep solution applied at: 1452. Wet prep solution dried at: 1455. Wet prep elapsed drying time: 3 mins.

## 2020-12-04 ENCOUNTER — OFFICE VISIT (OUTPATIENT)
Dept: CARDIOLOGY CLINIC | Age: 50
End: 2020-12-04
Payer: COMMERCIAL

## 2020-12-04 VITALS
BODY MASS INDEX: 39.22 KG/M2 | WEIGHT: 274 LBS | HEART RATE: 72 BPM | DIASTOLIC BLOOD PRESSURE: 92 MMHG | HEIGHT: 70 IN | OXYGEN SATURATION: 98 % | SYSTOLIC BLOOD PRESSURE: 142 MMHG

## 2020-12-04 DIAGNOSIS — I10 PRIMARY HYPERTENSION: Primary | ICD-10-CM

## 2020-12-04 PROCEDURE — 99214 OFFICE O/P EST MOD 30 MIN: CPT | Performed by: INTERNAL MEDICINE

## 2020-12-04 RX ORDER — METOPROLOL SUCCINATE 50 MG/1
50 TABLET, EXTENDED RELEASE ORAL 2 TIMES DAILY
Qty: 30 TAB | Refills: 6 | Status: SHIPPED | OUTPATIENT
Start: 2020-12-04 | End: 2021-02-26 | Stop reason: SDUPTHER

## 2020-12-04 RX ORDER — ISOSORBIDE MONONITRATE 30 MG/1
30 TABLET, EXTENDED RELEASE ORAL
Qty: 30 TAB | Refills: 6 | Status: SHIPPED | OUTPATIENT
Start: 2020-12-04 | End: 2021-02-26 | Stop reason: SDUPTHER

## 2020-12-04 NOTE — PROGRESS NOTES
[facility]    Mr. Remy Mcgee is 45 y.o. male with a history of CAD: Hypertension, hyperlipidemia, obesity, sleep apnea    Patient is here today for follow-up appointment  Patient underwent cardiac catheterization since last time. Patient was found to have a CAD to be managed medically. Since he has gone home after procedure, he has been feeling very well. With day-to-day activity, he does not have any chest pain or chest tightness. He has not perform any vigorous exercise as of yet. He is taking Toprol and isosorbide only once a day not twice a day as I recommended as he felt some jittery and hyper when he was taking it twice a day. He did not have any dizziness, presyncope or syncope. Overall he is very happy and he tells me that he is feeling back to normal without any symptoms as of now. Denies any nausea, vomiting, abdominal pain, fever, chills, sputum production. No hematuria or other bleeding complaints    Past Medical History:   Diagnosis Date    Asthma     Calculus of kidney     HLD (hyperlipidemia)     Hypertension     Obesity     S/P cardiac cath 11/2020    Borderline mid LAD and mid LCx ( iFR of both lesion, not significant)     Sleep apnea     not using CPAP       Review of Systems:  Cardiac symptoms as noted above in HPI. All others negative. Denies fatigue, malaise, skin rash, joint pain, blurring vision, photophobia, neck pain, hemoptysis, chronic cough, nausea, vomiting, hematuria, burning micturition, BRBPR, chronic headaches. Current Outpatient Medications   Medication Sig    metoprolol succinate (TOPROL-XL) 25 mg XL tablet Take 1 Tab by mouth two (2) times a day. (Patient taking differently: Take 25 mg by mouth daily.)    isosorbide dinitrate (ISORDIL) 20 mg tablet Take 1 Tab by mouth two (2) times a day.  (Patient taking differently: Take 20 mg by mouth daily.)    aspirin delayed-release 81 mg tablet Take 1 Tab by mouth daily.  nitroglycerin (NITROSTAT) 0.4 mg SL tablet 1 Tab by SubLINGual route every five (5) minutes as needed for Chest Pain. Up to 3 doses.  atorvastatin (LIPITOR) 40 mg tablet Take 1 Tab by mouth daily.  chlorthalidone (HYGROTEN) 25 mg tablet TAKE 1 TABLET BY MOUTH EVERY DAY    LORazepam (ATIVAN) 0.5 mg tablet Take 1 Tab by mouth every four (4) hours as needed for Anxiety. Max Daily Amount: 3 mg.  potassium chloride (KLOR-CON M20) 20 mEq tablet Take 20 mEq by mouth daily. No current facility-administered medications for this visit. Past Surgical History:   Procedure Laterality Date    HX HEENT      tonsillectomy    HX LITHOTRIPSY  10/9/2010    Left upper 3rd ureteral calculus    HX ORTHOPAEDIC      Left shoulder       Allergies and Sensitivities:  Allergies   Allergen Reactions    Pcn [Penicillins] Not Reported This Time    Sulfa (Sulfonamide Antibiotics) Other (comments)       Family History:  No family history on file. Social History:  Social History     Tobacco Use    Smoking status: Never Smoker    Smokeless tobacco: Never Used   Substance Use Topics    Alcohol use: Yes     Alcohol/week: 2.0 standard drinks     Types: 2 Cans of beer per week     Comment: occasionally     Drug use: No     He  reports that he has never smoked. He has never used smokeless tobacco.  He  reports current alcohol use of about 2.0 standard drinks of alcohol per week. Physical Exam:  BP Readings from Last 3 Encounters:   12/04/20 (!) 142/92   11/10/20 138/62   10/30/20 (!) 150/100         Pulse Readings from Last 3 Encounters:   12/04/20 72   11/10/20 67   10/30/20 71          Wt Readings from Last 3 Encounters:   12/04/20 124.3 kg (274 lb)   11/10/20 120.2 kg (265 lb)   10/30/20 125.2 kg (276 lb)       Constitutional: Oriented to person, place, and time. HENT: Head: Normocephalic and atraumatic  Neck: No JVD present. Carotid bruit is not appreciated.    Cardiovascular: Regular rhythm. No murmur, gallop or rubs appreciated  Lung: Breath sounds normal. No respiratory distress. No ronchi or rales appreciated  Abdominal: No tenderness. No rebound and no guarding. Musculoskeletal: There is trace lower extremity edema. No cynosis    LABS:   Lab Results   Component Value Date/Time    Sodium 140 11/03/2020 03:48 PM    Potassium 3.2 (L) 11/03/2020 03:48 PM    Chloride 98 11/03/2020 03:48 PM    CO2 32 11/03/2020 03:48 PM    Glucose 86 11/03/2020 03:48 PM    BUN 14 11/03/2020 03:48 PM    Creatinine 0.9 11/03/2020 03:48 PM     Lipids Latest Ref Rng & Units 1/10/2020 12/12/2018   Chol, Total 100 - 199 mg/dL 224(H) 213(H)   HDL >39 mg/dL 38(L) 39(L)   LDL 0 - 99 mg/dL 156(H) 141(H)   Trig 0 - 149 mg/dL 148 167(H)   Some recent data might be hidden     Lab Results   Component Value Date/Time    ALT (SGPT) 48 (H) 11/03/2020 03:48 PM     No results found for: HBA1C, HGBE8, TMQ5VIZD, XFR8UUHT, YAP2VZLU  Lab Results   Component Value Date/Time    TSH 0.47 12/12/2018 08:23 AM       EKG  Sinus rhythm at 66 bpm.  No pathologic Q waves. No ST changes of ischemia. ECHO (09/20)  Left Ventricle  Normal cavity size and systolic function (ejection fraction normal). Moderately increased wall thickness. Wall motion: normal. The estimated EF is 55 - 60%. Visually measured ejection fraction. There is mild (grade 1) left ventricular diastolic dysfunction. Wall Scoring  The left ventricular wall motion is normal.             Left Atrium  Normal cavity size. Left Atrium volume index is 29 mL/m2. Right Ventricle  Normal global systolic function. Mildly dilated right ventricle. Right Atrium  Mildly dilated right atrium. Aortic Valve  Normal valve structure, trileaflet valve structure, no stenosis and no regurgitation. Mitral Valve  Normal valve structure and no stenosis. Trace regurgitation. Tricuspid Valve  Normal valve structure and no stenosis. Trace regurgitation.     Pulmonic Valve  Pulmonic valve normal doppler findings. Normal valve structure and no stenosis. Mild regurgitation. Aorta  Mildly dilated aortic root; diameter is 3.6 cm. Pulmonary Artery  Pulmonary arterial systolic pressure (PASP) is 29 mmHg. Pulmonary hypertension not suggested by Doppler findings. STRESS TEST (010/20)  · Baseline ECG: Normal sinus rhythm. · Inconclusive stress test.  · Moderate risk Melendez treadmill score. And exercise for 7 minutes with heart rate achieved 120 bpm when patient started having 7 out of 10 chest pain with EKG changes. Post exercise nuclear images was not performed as he was not able to achieve target heart rate    CATHETERIZATION (11/20)  Left Main    Separate LAD and LCx ostia    Left Anterior Descending    Proximal LAD ectasia. Mild proximal disease. Mid LAD after D2 has eccentric borderline 60% narrowing. Otherwise LAD is normal IFR of mid LAD lesion was 0.93, suggesting physiologically not significant lesion D1: Ostial 60%, small caliber vessel D2: Ostial 85% stenosis, small-medium caliber vessel however supplying very small myocardial wall    Left Circumflex    Mid LCx borderline 50-60% tubular narrowing. IFR of mid LCx lesion was 0.97 suggesting physiologically not significant stenosis OM1: Mild proximal luminal irregularities otherwise normal    Right Coronary Artery    Large ectatic vessel No obstructive disease noted. Mild diffuse luminal irregularities      IMPRESSION & PLAN:  Mr. Danie Wilson is 45 y.o. male with multiple medical problem    CAD:  Patient had a cardiac catheterization in November 2020 which showed two-vessel CAD, IFR was not physiologically significant  Since then patient has remained on medical management with almost resolution of his anginal symptoms  He supposed to be taking Toprol 25 twice daily however he is taking all his only once. I would increase Toprol to 50 mg once daily. He supposed to be taking isosorbide 20 mg twice daily however he is taking it once.   I am going to change this to Imdur 30 mg daily  He was advised to call me if he has any symptoms of angina  I reviewed angiogram images with the patient and explained the nature of the CAD. Hypertension:  /92  He supposed to be taking Toprol 25 twice daily however he is taking all his only once. I would increase Toprol to 50 mg once daily. He supposed to be taking isosorbide 20 mg twice daily however he is taking it once. I am going to change this to Imdur 30 mg daily    Hyperlipidemia:  Based on his cholesterol profile in January 2020, his 10-year risk of having ASCVD is 7.8%. Added atorvastatin 40 mg daily in September 2020. Have advised patient to work on his diet and exercise program.  Repeat fasting lipid profile in 3 months    Obesity: He weighs 276-->274 pounds with BMI of 40. Laine Bianka discussed again the importance of diet and exercise to help lose weight. Goal weight loss of 50 pounds over next 6 to 12 months has been discussed. He is going to work to achieve the goal with change in dietary habits and exercise    This plan was discussed with patient who is in agreement. Thank you for allowing me to participate in patient care. Please feel free to call me if you have any question or concern. Latrice Hubbard MD  Please note: This document has been produced using voice recognition software. Unrecognized errors in transcription may be present.

## 2021-01-30 DIAGNOSIS — I10 PRIMARY HYPERTENSION: ICD-10-CM

## 2021-02-26 RX ORDER — METOPROLOL SUCCINATE 50 MG/1
50 TABLET, EXTENDED RELEASE ORAL 2 TIMES DAILY
Qty: 30 TAB | Refills: 6 | Status: SHIPPED | OUTPATIENT
Start: 2021-02-26 | End: 2021-09-27

## 2021-02-26 RX ORDER — ATORVASTATIN CALCIUM 40 MG/1
40 TABLET, FILM COATED ORAL DAILY
Qty: 30 TAB | Refills: 6 | Status: SHIPPED | OUTPATIENT
Start: 2021-02-26 | End: 2021-09-10 | Stop reason: SDUPTHER

## 2021-02-26 RX ORDER — ASPIRIN 81 MG/1
81 TABLET ORAL DAILY
Qty: 30 TAB | Refills: 6 | Status: SHIPPED | OUTPATIENT
Start: 2021-02-26 | End: 2021-12-09

## 2021-02-26 RX ORDER — ISOSORBIDE MONONITRATE 30 MG/1
30 TABLET, EXTENDED RELEASE ORAL
Qty: 30 TAB | Refills: 6 | Status: SHIPPED | OUTPATIENT
Start: 2021-02-26 | End: 2021-12-09

## 2021-03-04 DIAGNOSIS — I10 PRIMARY HYPERTENSION: ICD-10-CM

## 2021-06-28 ENCOUNTER — OFFICE VISIT (OUTPATIENT)
Dept: CARDIOLOGY CLINIC | Age: 51
End: 2021-06-28
Payer: COMMERCIAL

## 2021-06-28 VITALS
OXYGEN SATURATION: 97 % | BODY MASS INDEX: 39.65 KG/M2 | HEIGHT: 70 IN | HEART RATE: 96 BPM | DIASTOLIC BLOOD PRESSURE: 82 MMHG | WEIGHT: 277 LBS | SYSTOLIC BLOOD PRESSURE: 140 MMHG

## 2021-06-28 DIAGNOSIS — R07.9 CHEST PAIN, UNSPECIFIED TYPE: Primary | ICD-10-CM

## 2021-06-28 PROCEDURE — 99214 OFFICE O/P EST MOD 30 MIN: CPT | Performed by: INTERNAL MEDICINE

## 2021-06-28 RX ORDER — CHLORTHALIDONE 25 MG/1
TABLET ORAL
Qty: 90 TABLET | Refills: 3 | Status: SHIPPED | OUTPATIENT
Start: 2021-06-28 | End: 2022-02-17 | Stop reason: SINTOL

## 2021-06-28 NOTE — PROGRESS NOTES
[facility]    Mr. Eddie High is 45 y.o. male with a history of CAD: Hypertension, hyperlipidemia, obesity, sleep apnea    Patient is here today for follow-up appointment  Patient denies any use of nitroglycerin since last time. He had one episode of exertional chest discomfort which resolved with rest.  He is taking all his medication regularly except chlorthalidone. He thinks that he has ran out of chlorthalidone for almost 6 months. He has been having some lower extremity swelling lately. Because of work, patient feels like he is under a lot of stress. Denies any nausea, vomiting, abdominal pain, fever, chills, sputum production. No hematuria or other bleeding complaints    Past Medical History:   Diagnosis Date    Asthma     Calculus of kidney     HLD (hyperlipidemia)     Hypertension     Obesity     S/P cardiac cath 11/2020    Borderline mid LAD and mid LCx ( iFR of both lesion, not significant)     Sleep apnea     not using CPAP       Review of Systems:  Cardiac symptoms as noted above in HPI. All others negative. Denies fatigue, malaise, skin rash, joint pain, blurring vision, photophobia, neck pain, hemoptysis, chronic cough, nausea, vomiting, hematuria, burning micturition, BRBPR, chronic headaches. Current Outpatient Medications   Medication Sig    aspirin delayed-release 81 mg tablet Take 1 Tab by mouth daily.  atorvastatin (LIPITOR) 40 mg tablet Take 1 Tab by mouth daily.  isosorbide mononitrate ER (IMDUR) 30 mg tablet Take 1 Tab by mouth every morning.  metoprolol succinate (TOPROL-XL) 50 mg XL tablet Take 1 Tab by mouth two (2) times a day.  nitroglycerin (NITROSTAT) 0.4 mg SL tablet 1 Tab by SubLINGual route every five (5) minutes as needed for Chest Pain. Up to 3 doses.     chlorthalidone (HYGROTEN) 25 mg tablet TAKE 1 TABLET BY MOUTH EVERY DAY    LORazepam (ATIVAN) 0.5 mg tablet Take 1 Tab by mouth every four (4) hours as needed for Anxiety. Max Daily Amount: 3 mg.  potassium chloride (KLOR-CON M20) 20 mEq tablet Take 20 mEq by mouth daily. No current facility-administered medications for this visit. Past Surgical History:   Procedure Laterality Date    HX HEENT      tonsillectomy    HX LITHOTRIPSY  10/9/2010    Left upper 3rd ureteral calculus    HX ORTHOPAEDIC      Left shoulder       Allergies and Sensitivities:  Allergies   Allergen Reactions    Pcn [Penicillins] Not Reported This Time    Sulfa (Sulfonamide Antibiotics) Other (comments)       Family History:  No family history on file. Social History:  Social History     Tobacco Use    Smoking status: Never Smoker    Smokeless tobacco: Never Used   Substance Use Topics    Alcohol use: Yes     Alcohol/week: 2.0 standard drinks     Types: 2 Cans of beer per week     Comment: occasionally     Drug use: No     He  reports that he has never smoked. He has never used smokeless tobacco.  He  reports current alcohol use of about 2.0 standard drinks of alcohol per week. Physical Exam:  BP Readings from Last 3 Encounters:   12/04/20 (!) 142/92   11/10/20 138/62   10/30/20 (!) 150/100         Pulse Readings from Last 3 Encounters:   12/04/20 72   11/10/20 67   10/30/20 71          Wt Readings from Last 3 Encounters:   12/04/20 124.3 kg (274 lb)   11/10/20 120.2 kg (265 lb)   10/30/20 125.2 kg (276 lb)       Constitutional: Oriented to person, place, and time. HENT: Head: Normocephalic and atraumatic  Neck: No JVD present. Carotid bruit is not appreciated. Cardiovascular: Regular rhythm. No murmur, gallop or rubs appreciated  Lung: Breath sounds normal. No respiratory distress. No ronchi or rales appreciated  Abdominal: No tenderness. No rebound and no guarding. Musculoskeletal: There is 1+ lower extremity edema.  No cynosis    LABS:   Lab Results   Component Value Date/Time    Sodium 140 11/03/2020 03:48 PM    Potassium 3.2 (L) 11/03/2020 03:48 PM    Chloride 98 11/03/2020 03:48 PM    CO2 32 11/03/2020 03:48 PM    Glucose 86 11/03/2020 03:48 PM    BUN 14 11/03/2020 03:48 PM    Creatinine 0.9 11/03/2020 03:48 PM     Lipids Latest Ref Rng & Units 1/10/2020 12/12/2018   Chol, Total 100 - 199 mg/dL 224(H) 213(H)   HDL >39 mg/dL 38(L) 39(L)   LDL 0 - 99 mg/dL 156(H) 141(H)   Trig 0 - 149 mg/dL 148 167(H)   Some recent data might be hidden     Lab Results   Component Value Date/Time    ALT (SGPT) 48 (H) 11/03/2020 03:48 PM     No results found for: HBA1C, SBO5ERET, HQL6KYJK, IEM4REYJ  Lab Results   Component Value Date/Time    TSH 0.47 12/12/2018 08:23 AM       EKG  Sinus rhythm at 66 bpm.  No pathologic Q waves. No ST changes of ischemia. ECHO (09/20)  Left Ventricle  Normal cavity size and systolic function (ejection fraction normal). Moderately increased wall thickness. Wall motion: normal. The estimated EF is 55 - 60%. Visually measured ejection fraction. There is mild (grade 1) left ventricular diastolic dysfunction. Wall Scoring  The left ventricular wall motion is normal.             Left Atrium  Normal cavity size. Left Atrium volume index is 29 mL/m2. Right Ventricle  Normal global systolic function. Mildly dilated right ventricle. Right Atrium  Mildly dilated right atrium. Aortic Valve  Normal valve structure, trileaflet valve structure, no stenosis and no regurgitation. Mitral Valve  Normal valve structure and no stenosis. Trace regurgitation. Tricuspid Valve  Normal valve structure and no stenosis. Trace regurgitation. Pulmonic Valve  Pulmonic valve normal doppler findings. Normal valve structure and no stenosis. Mild regurgitation. Aorta  Mildly dilated aortic root; diameter is 3.6 cm. Pulmonary Artery  Pulmonary arterial systolic pressure (PASP) is 29 mmHg. Pulmonary hypertension not suggested by Doppler findings. STRESS TEST (010/20)  · Baseline ECG: Normal sinus rhythm.   · Inconclusive stress test.  · Moderate risk Duke treadmill score. And exercise for 7 minutes with heart rate achieved 120 bpm when patient started having 7 out of 10 chest pain with EKG changes. Post exercise nuclear images was not performed as he was not able to achieve target heart rate    CATHETERIZATION (11/20)  Left Main    Separate LAD and LCx ostia    Left Anterior Descending    Proximal LAD ectasia. Mild proximal disease. Mid LAD after D2 has eccentric borderline 60% narrowing. Otherwise LAD is normal IFR of mid LAD lesion was 0.93, suggesting physiologically not significant lesion D1: Ostial 60%, small caliber vessel D2: Ostial 85% stenosis, small-medium caliber vessel however supplying very small myocardial wall    Left Circumflex    Mid LCx borderline 50-60% tubular narrowing. IFR of mid LCx lesion was 0.97 suggesting physiologically not significant stenosis OM1: Mild proximal luminal irregularities otherwise normal    Right Coronary Artery    Large ectatic vessel No obstructive disease noted. Mild diffuse luminal irregularities      IMPRESSION & PLAN:  Mr. Shelby Esteban is 45 y.o. male with multiple medical problem    CAD:  Patient had a cardiac catheterization in November 2020 which showed two-vessel CAD, IFR was not physiologically significant  Since then patient has remained on medical management with almost resolution of his anginal symptoms  No use of nitroglycerin since last time. One episode of chest pain few months ago with exertion which resolved with the rest.  No unstable anginal symptoms  Continue with beta-blocker and Imdur  Continue with aspirin and statin    Hypertension:  /84. Currently taking beta-blocker and Imdur.   Was on chlorthalidone however is not taking chlorthalidone for last 6-month  We will restart chlorthalidone because of hypertension and some lower extremity swelling  We will check BMP    Hyperlipidemia:  Based on his cholesterol profile in January 2020, his 10-year risk of having ASCVD is 7. 8%.  Added atorvastatin 40 mg daily in September 2020. Have advised patient to work on his diet and exercise program.  Repeat fasting lipid profile before next visit. He did not get his lab checked    Obesity: He weighs 277 pounds with BMI of 40. Roseline Damon discussed again the importance of diet and exercise to help lose weight again during this visit. He is going to talk to PCP and try to get a nutritional referral    This plan was discussed with patient who is in agreement. Thank you for allowing me to participate in patient care. Please feel free to call me if you have any question or concern. Carolyn Orozco MD  Please note: This document has been produced using voice recognition software. Unrecognized errors in transcription may be present.

## 2021-06-28 NOTE — PROGRESS NOTES
Gina Saleem presents today for   Chief Complaint   Patient presents with    Follow-up     6 month f/u       Gina Saleem preferred language for health care discussion is english/other. Is someone accompanying this pt? no    Is the patient using any DME equipment during [address]? no    Depression Screening:  3 most recent PHQ Screens 6/28/2021   Little interest or pleasure in doing things Not at all   Feeling down, depressed, irritable, or hopeless Not at all   Total Score PHQ 2 0       Learning Assessment:  Learning Assessment 1/16/2020   PRIMARY LEARNER Patient   HIGHEST LEVEL OF EDUCATION - PRIMARY LEARNER  > 4 YEARS OF COLLEGE   BARRIERS PRIMARY LEARNER NONE   CO-LEARNER CAREGIVER No   PRIMARY LANGUAGE ENGLISH   LEARNER PREFERENCE PRIMARY DEMONSTRATION   ANSWERED BY patient   RELATIONSHIP SELF       Abuse Screening:  Abuse Screening Questionnaire 6/28/2021   Do you ever feel afraid of your partner? N   Are you in a relationship with someone who physically or mentally threatens you? N   Is it safe for you to go home? Y       Fall Risk  Fall Risk Assessment, last 12 mths 10/2/2020   Able to walk? Yes   Fall in past 12 months? No       Pt currently taking Anticoagulant therapy? no    Coordination of Care:  1. Have you been to the ER, urgent care clinic since your last visit? Hospitalized since your last visit? no    2. Have you seen or consulted any other health care providers outside of the [address] [place] [address] since your last visit? Include any pap smears or colon screening.  no

## 2021-08-09 ENCOUNTER — TELEPHONE (OUTPATIENT)
Dept: INTERNAL MEDICINE CLINIC | Age: 51
End: 2021-08-09

## 2021-08-09 DIAGNOSIS — I10 ESSENTIAL HYPERTENSION: ICD-10-CM

## 2021-08-09 DIAGNOSIS — I87.2 VENOUS INSUFFICIENCY OF BOTH LOWER EXTREMITIES: ICD-10-CM

## 2021-08-09 DIAGNOSIS — E78.5 DYSLIPIDEMIA: ICD-10-CM

## 2021-08-09 DIAGNOSIS — Z86.39 HISTORY OF HYPOKALEMIA: ICD-10-CM

## 2021-08-09 DIAGNOSIS — Z86.39 HISTORY OF HYPOKALEMIA: Primary | ICD-10-CM

## 2021-08-09 DIAGNOSIS — I25.10 CORONARY ARTERY DISEASE INVOLVING NATIVE CORONARY ARTERY OF NATIVE HEART WITHOUT ANGINA PECTORIS: ICD-10-CM

## 2021-08-09 NOTE — TELEPHONE ENCOUNTER
Yes I will accept under my panel. He will need labs 1 week prior to his appointment. Lab orders placed      ICD-10-CM ICD-9-CM    1. History of hypokalemia  C13.45 R88.24 METABOLIC PANEL, COMPREHENSIVE   2. Essential hypertension  Q19 549.5 METABOLIC PANEL, COMPREHENSIVE   3. Venous insufficiency of both lower extremities  I87.2 459.81 LIPID PANEL   4. Dyslipidemia  E78.5 272.4 LIPID PANEL   5.  Coronary artery disease involving native coronary artery of native heart without angina pectoris  I25.10 414.01

## 2021-08-24 ENCOUNTER — APPOINTMENT (OUTPATIENT)
Dept: INTERNAL MEDICINE CLINIC | Age: 51
End: 2021-08-24

## 2021-08-25 LAB
A-G RATIO,AGRAT: 2.2 RATIO (ref 1.1–2.6)
ALBUMIN SERPL-MCNC: 4.4 G/DL (ref 3.5–5)
ALP SERPL-CCNC: 67 U/L (ref 25–115)
ALT SERPL-CCNC: 51 U/L (ref 5–40)
ANION GAP SERPL CALC-SCNC: 10 MMOL/L (ref 3–15)
AST SERPL W P-5'-P-CCNC: 30 U/L (ref 10–37)
BILIRUB SERPL-MCNC: 0.7 MG/DL (ref 0.2–1.2)
BUN SERPL-MCNC: 14 MG/DL (ref 6–22)
CALCIUM SERPL-MCNC: 9.3 MG/DL (ref 8.4–10.5)
CHLORIDE SERPL-SCNC: 102 MMOL/L (ref 98–110)
CHOLEST SERPL-MCNC: 142 MG/DL (ref 110–200)
CO2 SERPL-SCNC: 30 MMOL/L (ref 20–32)
CREAT SERPL-MCNC: 1 MG/DL (ref 0.5–1.2)
GFRAA, 66117: >60
GFRNA, 66118: >60
GLOBULIN,GLOB: 2 G/DL (ref 2–4)
GLUCOSE SERPL-MCNC: 128 MG/DL (ref 70–99)
HDLC SERPL-MCNC: 3.5 MG/DL (ref 0–5)
HDLC SERPL-MCNC: 41 MG/DL
LDL/HDL RATIO,LDHD: 2
LDLC SERPL CALC-MCNC: 82 MG/DL (ref 50–99)
NON-HDL CHOLESTEROL, 011976: 101 MG/DL
POTASSIUM SERPL-SCNC: 3.3 MMOL/L (ref 3.5–5.5)
PROT SERPL-MCNC: 6.4 G/DL (ref 6.4–8.3)
SODIUM SERPL-SCNC: 142 MMOL/L (ref 133–145)
TRIGL SERPL-MCNC: 96 MG/DL (ref 40–149)
VLDLC SERPL CALC-MCNC: 19 MG/DL (ref 8–30)

## 2021-08-26 NOTE — PROGRESS NOTES
New patient appointment scheduled for 8/31/2021  1. Elevated glucose 128  2. Hypokalemia-potassium 3.3  3. ALT 51 AST normal4.   LDL 82; triglyceride 96

## 2021-08-31 ENCOUNTER — OFFICE VISIT (OUTPATIENT)
Dept: INTERNAL MEDICINE CLINIC | Age: 51
End: 2021-08-31
Payer: COMMERCIAL

## 2021-08-31 VITALS
BODY MASS INDEX: 39 KG/M2 | SYSTOLIC BLOOD PRESSURE: 139 MMHG | HEIGHT: 70 IN | DIASTOLIC BLOOD PRESSURE: 89 MMHG | HEART RATE: 70 BPM | WEIGHT: 272.4 LBS | TEMPERATURE: 97.1 F | RESPIRATION RATE: 18 BRPM | OXYGEN SATURATION: 97 %

## 2021-08-31 DIAGNOSIS — I10 ESSENTIAL HYPERTENSION: ICD-10-CM

## 2021-08-31 DIAGNOSIS — R74.01 ELEVATED ALT MEASUREMENT: ICD-10-CM

## 2021-08-31 DIAGNOSIS — E66.9 OBESITY (BMI 30-39.9): ICD-10-CM

## 2021-08-31 DIAGNOSIS — E87.6 HYPOKALEMIA: ICD-10-CM

## 2021-08-31 DIAGNOSIS — I25.10 CORONARY ARTERY DISEASE INVOLVING NATIVE CORONARY ARTERY OF NATIVE HEART WITHOUT ANGINA PECTORIS: ICD-10-CM

## 2021-08-31 DIAGNOSIS — Z76.89 ENCOUNTER TO ESTABLISH CARE WITH NEW DOCTOR: Primary | ICD-10-CM

## 2021-08-31 DIAGNOSIS — R60.0 BILATERAL LOWER EXTREMITY EDEMA: ICD-10-CM

## 2021-08-31 DIAGNOSIS — Z28.9 COVID-19 VACCINATION NOT DONE: ICD-10-CM

## 2021-08-31 DIAGNOSIS — Z51.81 THERAPEUTIC DRUG MONITORING: ICD-10-CM

## 2021-08-31 DIAGNOSIS — G47.30 SLEEP APNEA, UNSPECIFIED TYPE: ICD-10-CM

## 2021-08-31 DIAGNOSIS — F41.9 ANXIETY: ICD-10-CM

## 2021-08-31 DIAGNOSIS — E78.2 MIXED HYPERLIPIDEMIA: ICD-10-CM

## 2021-08-31 DIAGNOSIS — Z12.11 COLON CANCER SCREENING: ICD-10-CM

## 2021-08-31 DIAGNOSIS — R73.09 ELEVATED GLUCOSE LEVEL: ICD-10-CM

## 2021-08-31 DIAGNOSIS — Z78.9 ALCOHOL CONSUMPTION OF ONE TO FOUR DRINKS PER DAY: ICD-10-CM

## 2021-08-31 PROBLEM — E78.5 DYSLIPIDEMIA: Status: RESOLVED | Noted: 2021-08-09 | Resolved: 2021-08-31

## 2021-08-31 PROBLEM — R73.01 IMPAIRED FASTING GLUCOSE: Status: RESOLVED | Noted: 2018-12-19 | Resolved: 2021-08-31

## 2021-08-31 PROBLEM — Z86.39 HISTORY OF HYPOKALEMIA: Status: RESOLVED | Noted: 2021-08-09 | Resolved: 2021-08-31

## 2021-08-31 PROCEDURE — 99215 OFFICE O/P EST HI 40 MIN: CPT | Performed by: NURSE PRACTITIONER

## 2021-08-31 RX ORDER — POTASSIUM CHLORIDE 20 MEQ/1
20 TABLET, EXTENDED RELEASE ORAL DAILY
Qty: 90 TABLET | Refills: 1 | Status: SHIPPED | OUTPATIENT
Start: 2021-08-31 | End: 2022-02-24

## 2021-08-31 RX ORDER — LORAZEPAM 0.5 MG/1
0.5 TABLET ORAL
Qty: 30 TABLET | Refills: 2 | Status: SHIPPED | OUTPATIENT
Start: 2021-08-31 | End: 2022-02-24 | Stop reason: SDUPTHER

## 2021-08-31 NOTE — PROGRESS NOTES
Chief Complaint   Patient presents with   [address] [place] [address]       1. Have you been to the ER, urgent care clinic since your last visit? Hospitalized since your last visit? Yes, ER for cardiac cath 11/2020    2. Have you seen or consulted any other health care providers outside of the [address] [place] [address] since your last visit? Include any pap smears or colon screening.  No

## 2021-08-31 NOTE — PROGRESS NOTES
[facility] [id number] [place] [address] [place], [address]  [phone] [facility] [place], [place]/[phone] fax    8/31/2021    HPI:   Shantal Payne 1970 is a pleasant [address] [facility] male who presents today to establish care and for routine physical exam.  He works as a . He is  with 3 sons. Hypertension/CAD/cholesterol: Seeing Dr. Sunny Monroy, cardiology. Taking chlorthalidone, aspirin, atorvastatin, Imdur, Toprol as prescribed. Tolerating these therapies well. Checks blood pressures at home periodically with the systolic being in the 540F and diastolic in the 02U. He does have nitroglycerin available to him but has not required this medication. Edema bilateral lower extremities: Since starting the chlorthalidone swelling has improved tremendously. History of hypokalemia: This occurs due to taking a fluid pill and working outside in the heat and sweating. He consumes between 1-1/2 to 2 gallons of water per day and takes Klor-Con when he feels like his potassium is low; usually this is when he gets tingling in his fingers. Alcohol consumption: Drinks 2-3 beers per day. Obesity: He is aware of his weight and the need to lose weight. He is requesting a referral to a nutritionist.    Sleep apnea: Diagnosed with central sleep apnea years ago. Does not utilize a CPAP due to feeling claustrophobic. He does not sleep well at night. His wife tells him he gasps for air while he is sleeping. His snoring is disturbing so him and his spouse sleep in separate rooms. He is not wishing to see a pulmonologist or sleep specialist at this time. Anxiety: Has suffered with anxiety and panic attacks for many years. He takes Ativan as needed; approximately 1 time a week. He is not seeking to take a long-term medication for his anxiety. HM: Willing to schedule with GI for colonoscopy. Has not obtained his Covid vaccine.     Past Medical History: Diagnosis Date    Asthma     Calculus of kidney     Coronary artery disease involving native coronary artery of native heart without angina pectoris 8/9/2021    HLD (hyperlipidemia)     Hypertension     Hypokalemia 9/17/2014    Obesity     Obesity (BMI 30-39.9) 2/5/2018    Paresthesias/numbness 6/13/2018    Primary hypertension 6/13/2018    S/P cardiac cath 11/2020    Borderline mid LAD and mid LCx ( iFR of both lesion, not significant)     Sleep apnea     not using CPAP    Venous insufficiency of both lower extremities 1/15/2018     Past Surgical History:   Procedure Laterality Date    HX HEENT      tonsillectomy    HX LITHOTRIPSY  10/9/2010    Left upper 3rd ureteral calculus    HX ORTHOPAEDIC      Left shoulder     Current Outpatient Medications   Medication Sig    potassium chloride (Klor-Con M20) 20 mEq tablet Take 1 Tablet by mouth daily.  LORazepam (ATIVAN) 0.5 mg tablet Take 1 Tablet by mouth nightly as needed for Anxiety. Max Daily Amount: 0.5 mg.    chlorthalidone (HYGROTON) 25 mg tablet TAKE 1 TABLET BY MOUTH EVERY DAY    aspirin delayed-release 81 mg tablet Take 1 Tab by mouth daily.  atorvastatin (LIPITOR) 40 mg tablet Take 1 Tab by mouth daily.  isosorbide mononitrate ER (IMDUR) 30 mg tablet Take 1 Tab by mouth every morning.  metoprolol succinate (TOPROL-XL) 50 mg XL tablet Take 1 Tab by mouth two (2) times a day.  nitroglycerin (NITROSTAT) 0.4 mg SL tablet 1 Tab by SubLINGual route every five (5) minutes as needed for Chest Pain. Up to 3 doses. No current facility-administered medications for this visit. Allergies and Intolerances: Allergies   Allergen Reactions    Pcn [Penicillins] Not Reported This Time    Sulfa (Sulfonamide Antibiotics) Other (comments)     Family History:   History reviewed. No pertinent family history. Social History:   He  reports that he has never smoked.  He has never used smokeless tobacco.   Social History     Substance and Sexual Activity   Alcohol Use Yes    Alcohol/week: 2.0 standard drinks    Types: 2 Cans of beer per week    Comment: occasionally      Immunization History: There is no immunization history on file for this patient. Review of Systems:   As above included in HPI. Otherwise 11 point review of systems negative including constitutional, skin, HENT, eyes, respiratory, cardiovascular, gastrointestinal, genitourinary, musculoskeletal, endocrine, hematologic, allergy, and neurologic. Physical:   Visit Vitals  /89   Pulse 70   Temp 97.1 °F (36.2 °C) (Temporal)   Resp 18   Ht 5' 10\" (1.778 m)   Wt 272 lb 6.4 oz (123.6 kg)   SpO2 97%   BMI 39.09 kg/m²      Wt Readings from Last 3 Encounters:   08/31/21 272 lb 6.4 oz (123.6 kg)   06/28/21 277 lb (125.6 kg)   12/04/20 274 lb (124.3 kg)       Exam:   Physical Exam  Vitals and nursing note reviewed. Constitutional:       Appearance: Normal appearance. He is obese. HENT:      Head: Normocephalic and atraumatic. Right Ear: External ear normal.      Left Ear: External ear normal.   Eyes:      Extraocular Movements: Extraocular movements intact. Conjunctiva/sclera: Conjunctivae normal.   Neck:      Vascular: No carotid bruit. Cardiovascular:      Rate and Rhythm: Normal rate and regular rhythm. Pulses: Normal pulses. Heart sounds: Normal heart sounds. Comments: Mild swelling noted to the bilateral lower extremities  Pulmonary:      Effort: Pulmonary effort is normal. No respiratory distress. Breath sounds: Normal breath sounds. No wheezing. Musculoskeletal:         General: Normal range of motion. Cervical back: Normal range of motion and neck supple. Comments: Gait stable   Skin:     General: Skin is warm and dry. Neurological:      General: No focal deficit present. Mental Status: He is alert and oriented to person, place, and time.    Psychiatric:         Mood and Affect: Mood normal.         Behavior: Behavior normal.         Thought Content: Thought content normal.         Judgment: Judgment normal.       Review of Data:  Labs reviewed: Yes  1. Elevated glucose 128  2. Hypokalemia-potassium 3.3  3. ALT 51 AST normal   4. LDL 82; triglyceride 96      Plan:    ICD-10-CM ICD-9-CM    1. Encounter to establish care with new doctor  Z76.89 V65.8    2. Essential hypertension  F55 414.8 METABOLIC PANEL, COMPREHENSIVE   3. Mixed hyperlipidemia  E78.2 272.2    4. Coronary artery disease involving native coronary artery of native heart without angina pectoris  I25.10 414.01    5. Bilateral lower extremity edema  L32.2 680.1 METABOLIC PANEL, COMPREHENSIVE   6. Hypokalemia  E87.6 276.8 potassium chloride (Klor-Con M20) 20 mEq tablet      METABOLIC PANEL, COMPREHENSIVE   7. Alcohol consumption of one to four drinks per day  Z78.9 V69.8    8. Obesity (BMI 30-39. 9)  E66.9 278.00 REFERRAL TO DIETITIAN   9. Sleep apnea, unspecified type  G47.30 780.57    10. Anxiety  F41.9 300.00 LORazepam (ATIVAN) 0.5 mg tablet      PAIN MGMT PANEL, URINE W/RFLX CONFIRM   11. Elevated ALT measurement  D76.93 541.5 METABOLIC PANEL, COMPREHENSIVE   12. Elevated glucose level  R73.09 790.29 HEMOGLOBIN A1C WITH EAG   13. Colon cancer screening  Z12.11 V76.51 REFERRAL TO GASTROENTEROLOGY   14. COVID-19 vaccination not done  Z28.9 V64.00    15. Therapeutic drug monitoring  Z51.81 V58.83 PAIN MGMT PANEL, URINE W/RFLX CONFIRM     Encounter to establish care new provider  Patient is transferring to me from their previous provider. I spent no less than 20 minutes reviewing and updating the chart to include previous labs, diagnostics, and specialty notes. Reviewed medication and completed the medication reconciliation with the patient. Reviewed side effects of medications with the patient. Questions were answered and patient verb understanding.   Informed patient chronic medication refills will not be given between their scheduled appointments; all refills will be given at the time of their scheduled follow-up appointments. Patient verbalized understanding. Essential hypertension  continue chlorthalidone, Imdur, Toprol  Reduce sodium in diet  Avoid all pork  Initiate cardio exercise  Weight reduction  Report BP readings greater than 139/89 to office    Mixed hyperlipidemia   LDL 82; triglyceride 96  continue Atorvastatin 40mg  Reduce fried fatty or oily foods in diet  Limit red meats and alcohol. Limit fast food  Work on weight reduction    -Coronary Artery Disease  Continue Imdur and aspirin  Follow-up with cardiology as scheduled    Bilateral lower extremity edema  continue chlorthalidone  Elevate lower extremities above heart  Avoid sitting with legs dependent more than 30 minutes at a time  Increase walking exercise  Wear compression socks while awake (15 to 30 mmHg)  Limit sodium in diet  Avoid pork    Hypokalemia  Potassium level 3.3  Continue Klor-Con 20 mEq as prescribed  Strongly encourage patient not to wait to become symptomatic before taking the potassium supplement; take daily  Possible causes for reducing potassium:  Diuretics  Alcohol consumption  Lack of potassium diet  CMP 6 months    Alcohol consumption 1-4 drinks per day  He consumes 2-3 beers per day  Encouraged reduction of alcohol intake    Obesity  BMI is out of normal parameters and plan is as follows: Discussed in great detail on diet, portion control, exercise, avoiding foods high in sugar, carbs and starches, fatty/greasy foods and to eat until satisfied not til full. I have counseled this patient on diet and exercise regimens as well. Patient verbalized understanding.    Referral to dietitian placed    Sleep apnea  Discussed the importance of treating sleep apnea  Encourage patient to do his research on line for different devices that may not cause him to feel claustrophobic  Educated patient the seriousness of having sleep apnea and the possibility of mortality. Anxiety  Will refill Ativan 0.5 mg on an as-needed basis. Will not increase dose nor will prescribe more than 30 tabs at a time. Educated patient on the addictive component with long-term use of Ativan  Obtain UDS 6 months    Elevated ALT  ALT 51; AST normal  Discussed possible causes for increased ALT such as alcohol consumption, statin therapy, over-the-counter medications. Recheck CMP 6 months    Elevated glucose level  Glucose level 128  A1c 6 months    Colon cancer screening  Referral placed for gastroenterology for colonoscopy. COVID-19 vaccination not done  Much education was given to the patient in reference to the COVID-19 vaccination and possible long-term effects of Covid infection if contracted. Spoke with patient in great detail in reference to obtaining the Covid vaccine. Instructed pt on how to protect herself and others from covid ie wear face masks when leaving home, sanitize/wash hands frequently, avoid touching face, cough and sneeze into their elbow. Follow recommendations of their local and state health departments. Answered all questions/concerns from patient. Follow up 6 months  Labs needed 1 week prior to appt: Yes  A1c, UDS, CMP    Dr. Emelia Bowser, AGNP-C, DNP  [facility]     45 minutes with the patient on counseling, answering questions, and/or coordination of care. Complex medical review of medical history, lab results, and testing. Complicated management plan formulated. This does not include AWV or ACP time (if appropriate).

## 2021-08-31 NOTE — LETTER
CONTROLLED SUBSTANCE MEDICATION AGREEMENT  Patient Name: Lakeshia Altman  Patient YOB: 1970     I understand, that controlled substance medications may be used to help better manage my symptoms and to improve my ability to function at home, work and in social settings. However, I also understand that these medications do have risks, which have been discussed with me, including possible development of physical or psychological dependence. I understand that successful treatment requires mutual trust and honesty between me and my provider. I understand and agree that following this Medication Agreement is necessary in continuing my provider-patient relationship and the success of my treatment plan. Explanation from my Provider: Benefits and Goals of Controlled Substance Medications: There are two potential goals for your treatment: (1) decreased pain and suffering (2) improved daily life functions. There are many possible treatments for your chronic condition(s). Alternatives such as physical therapy, yoga, massage, home daily exercise, meditation, relaxation techniques, injections, chiropractic manipulations, surgery, cognitive therapy, hypnosis and many medications that are not habit-forming may be used. Use of controlled substance medications may be helpful, but they are unlikely to resolve all symptoms or restore all function. Explanation from my Provider: Risks of Controlled Substance Medications:   Opioid pain medications: These medications can lead to problems such as addiction/dependence, sedation, lightheadedness/dizziness, memory issues, falls, constipation, nausea, or vomiting. They may also impair the ability to drive or operate machinery. Additionally, these medications may lower testosterone levels, leading to loss of bone strength, stamina and sex drive.   They may cause problems with breathing, sleep apnea and reduced coughing, which is especially dangerous for patients with lung disease. Overdose or dangerous interactions with alcohol and other medications may occur, leading to death. Hyperalgesia may develop, which means patients receiving opioids for the treatment of pain may become more sensitive to certain painful stimuli, and in some cases, experience pain from ordinarily non-painful stimuli. Women between the ages of 14-53 who could become pregnant should carefully weigh the risks and benefits of opioids with their physicians, as these medications increase the risk of pregnancy complications, including miscarriage,  delivery and stillbirth. It is also possible for babies to be born addicted to opioids. Opioid dependence withdrawal symptoms may include; feelings of uneasiness, increased pain, irritability, belly pain, diarrhea, sweats and goose-flesh. Testosterone replacement therapy:  Potential side effects include increased risk of stroke and heart attack, blood clots, increased blood pressure, increased cholesterol, enlarged prostate, sleep apnea, irritability/aggression and other mood disorders, and decreased fertility. Ben Humphrey Jr (1970)             Page 1 of 4    Initials:_______    Benzodiazepines and non-benzodiazepine sleep medications: These medications can lead to problems such as addiction/dependence, sedation, fatigue, lightheadedness, dizziness, incoordination, falls, depression, hallucinations, and impaired judgment, memory and concentration. The ability to drive and operate machinery may also be affected. Abnormal sleep-related behaviors have been reported, including sleepwalking, driving, making telephone calls, eating, or having sex while not fully awake. These medications can suppress breathing and worsen sleep apnea, particularly when combined with alcohol or other sedating medications, potentially leading to death. Dependence withdrawal symptoms may include tremors, anxiety, hallucinations and seizures.    Stimulants: Common adverse effects include addiction/dependence, increased blood pressure and heart rate, decreased appetite, nausea, involuntary weight loss, insomnia,  irritability, and headaches. These risks may increase when these medications are combined with other stimulants, such as caffeine pills or energy drinks, certain weight loss supplements and oral decongestants. Dependence withdrawal symptoms may include depressed mood, loss of interest, suicidal thoughts, anxiety, fatigue, appetite changes and agitation. I agree and understand that I and my prescriber have the following rights and responsibilities regarding my treatment plan:   1. MY RIGHTS:  To be informed of my treatment and medication plan. To be an active participant in my health and wellbeing. 2. MY RESPONSIBILITY AND UNDERSTANDING FOR USE OF MEDICATIONS   I will take medications at the dose and frequency as directed. For my safety, I will not increase or change how I take my medications without the recommendation of my healthcare provider.  I will actively participate in any program recommended by my provider which may improve function, including social, physical, psychological programs.  I will not take my medications with alcohol or other drugs not prescribed to me. I understand that drinking alcohol with my medications increases the chances of side effects, including reduced breathing rate and could lead to personal injury when operating machinery.  I understand that if I have a history of substance use disorders, including alcohol or other illicit drugs, that I may be at increased risk of addiction to my medications.  I agree to notify my provider immediately if I should become pregnant so that my treatment plan can be adjusted.    I agree and understand that I shall only receive controlled substance medications from the prescriber that signed this agreement unless there is written agreement among other prescribers of controlled substances outlining the responsibility of the medications being prescribed.  I understand that the if the controlled medication is not helping to achieve goals, the dosage may be tapered and no longer prescribed. 3. MY RESPONSIBILITY FOR COMMUNICATION / PRESCRIPTION RENEWALS   I agree that all controlled substance medications that I take will be prescribed only by my provider. If another healthcare provider prescribes me medication in an emergency, I will notify my provider within seventy-two (72) hours. Mily Dunn Jr (1970)             Page 2 of 4    Initials:_______  Alysia Pham I will arrange for refills at the prescribed interval ONLY during regular office hours. I will not ask for refills earlier than agreed, after-hours, on holidays or weekends. Refills may take up to 72 hours for processing and prescriptions to reach the pharmacy.  I will inform my other health care providers that I am taking these medications and of the existence of this [id number]. In the event of an emergency, I will provide the same information to the emergency department prescribers.  I will keep my provider updated on the pharmacy I am using for controlled medication prescription filling. 4. MY RESPONSIBILITY FOR PROTECTING MEDICATIONS   I will protect my prescriptions and medications. I understand that lost or misplaced prescriptions will not be replaced.  I will keep medications only for my own use and will not share them with others. I will keep all medications away from children.  I agree that if my medications are adjusted or discontinued, I will properly dispose of any remaining medications. I understand that I will be required to dispose of any remaining controlled medications as, directed by my prescriber, prior to being provided with any prescriptions for other controlled medications.   Medication drop box locations can be found at: [employer]  5. MY RESPONSIBILITY WITH ILLEGAL DRUGS    I will not use illegal or street drugs or another person's prescription medications not prescribed to me.  If there are identified addiction type symptoms, then referral to a program may be provided by my provider and I agree to follow through with this recommendation. 6. MY RESPONSIBILITY FOR COOPERATION WITH INVESTIGATIONS   I understand that my provider will comply with any applicable law and may discuss my use and/or possible misuse/abuse of controlled substances and alcohol, as appropriate, with any health care provider involved in my care, pharmacist, or legal authority.  I authorize my provider and pharmacy to cooperate fully with law enforcement agencies (as permitted by law) in the investigation of any possible misuse, sale, or other diversion of my controlled substances.  I agree to waive any applicable privilege or right of privacy or confidentiality with respect to these authorizations. 7. PROVIDERS RIGHT TO MONITOR FOR SAFETY: PRESCRIPTION MONITORING / DRUG TESTING   I consent to drug/toxicology screening and will submit to a drug screen upon my providers request to assure I am only taking the prescribed drugs for my safety monitoring. I understand that a drug screen is a laboratory test in which a sample of my urine, blood or saliva is checked to see what drugs I have been taking. This may entail an observed urine specimen, which means that a nurse or other health care provider may watch me provide urine, and I will cooperate if I am asked to provide an observed specimen. Urban Mater (1970)             Page 3 of 4    Initials:_______  [facility] I understand that my provider will check a copy of my State Prescription Monitoring Program () Report in order to safely prescribe medications.      Pill Counts: I consent to pill counts when requested. I may be asked to bring all my prescribed controlled substance medications, in their original bottles, to all of my scheduled appointments. In addition, my provider may ask me to come to the practice at any time for a random pill count. 8. TERMINATION OF THIS AGREEMENT   For my safety, my prescriber has the right to stop prescribing controlled substance medications and may end this agreement.  Conditions that may result in termination of this agreement:  a. I do not show any improvement in pain, or my activity has not improved. b. I develop rapid tolerance or loss of improvement, as described in my treatment plan.  c. I develop significant side effects from the medication. d. My behavior is not consistent with the responsibilities outlined above, thereby causing safety concerns to continue prescribing controlled substance medications. e. I fail to follow the terms of this agreement. f. Other:____________________________     UNDERSTANDING THIS MEDICATION AGREEMENT:    I have read the above and have had all my questions answered. For chronic disease management, I know that my symptoms can be managed with many types of treatments. A chronic medication trial may be part of my treatment, but I must be an active participant in my care. Medication therapy is only one part of my symptom management plan. In some cases, there may be limited scientific evidence to support the chronic use of certain medications to improve symptoms and daily function. Furthermore, in certain circumstances, there may be scientific information that suggests that the use of chronic controlled substances may worsen my symptoms and increase my risk of unintentional death directly related to this medication therapy.   I know that if my provider feels my risk from controlled medications is greater than my benefit, I will have my controlled substance medication(s) compassionately lowered or removed altogether. I further agree to allow this office to contact my HIPAA contact if there are concerns about my safety and use of the controlled medications. I have agreed to use the prescribed controlled substance medications to me as instructed by my provider and as stated in this Medication Agreement. My initial on each page and my signature below shows that I have read each page and I have had the opportunity to ask questions with answers provided by my provider.       Patient Name (Printed): _____________________________________    Patient Signature:  ______________________   Date: _____________      Prescriber Name (Printed): ___________________________________    Prescriber Signature: _____________________  Date: _____________     Chris Whyte (1970)             Page 4 of 4

## 2021-09-02 RX ORDER — METOPROLOL SUCCINATE 25 MG/1
TABLET, EXTENDED RELEASE ORAL
Qty: 60 TABLET | Refills: 6 | Status: SHIPPED | OUTPATIENT
Start: 2021-09-02 | End: 2022-03-02 | Stop reason: SDUPTHER

## 2021-09-13 RX ORDER — ATORVASTATIN CALCIUM 40 MG/1
40 TABLET, FILM COATED ORAL DAILY
Qty: 30 TABLET | Refills: 6 | Status: SHIPPED | OUTPATIENT
Start: 2021-09-13 | End: 2021-12-09 | Stop reason: SDUPTHER

## 2021-09-15 ENCOUNTER — HOSPITAL ENCOUNTER (OUTPATIENT)
Dept: NUTRITION | Age: 51
Discharge: HOME | End: 2021-09-15
Payer: COMMERCIAL

## 2021-09-15 PROCEDURE — 97802 MEDICAL NUTRITION INDIV IN: CPT

## 2021-09-16 NOTE — PROGRESS NOTES
[address] [place] [address]     Nutrition Assessment  Medical Nutrition Therapy   Outpatient Initial Evaluation         Patient Name: Tawanna Sharp : 1970   Treatment Diagnosis: Obesity     Referral Source: Ronan [facility]): 09/15/2021     Gender: male Age: 45 y.o. Ht: 70 in Wt: 275  lb  kg   BMI: 44 BMR   Male 2105 BMR Female      Past Medical History:  Obesity  Mixed hyperlipidemia  HTN       Pertinent Medications:   Lipitor     Biochemical Data:   No results found for: HBA1C, QBU4ZBOX, BUZ3HSRN  Lab Results   Component Value Date/Time    Sodium 142 2021 07:55 AM    Potassium 3.3 (L) 2021 07:55 AM    Chloride 102 2021 07:55 AM    CO2 30 2021 07:55 AM    Anion gap 10.0 2021 07:55 AM    Glucose 128 (H) 2021 07:55 AM    BUN 14 2021 07:55 AM    Creatinine 1.0 2021 07:55 AM    BUN/Creatinine ratio 16 01/10/2020 12:00 AM    GFR est AA 92 01/10/2020 12:00 AM    GFR est non-AA 80 01/10/2020 12:00 AM    Calcium 9.3 2021 07:55 AM    Bilirubin, total 0.7 2021 07:55 AM    Alk. phosphatase 67 2021 07:55 AM    Protein, total 6.4 2021 07:55 AM    Albumin 4.4 2021 07:55 AM    Globulin 2.0 2021 07:55 AM    A-G Ratio 2.2 2021 07:55 AM    ALT (SGPT) 51 (H) 2021 07:55 AM    AST (SGOT) 30 2021 07:55 AM     Lab Results   Component Value Date/Time    Cholesterol, total 142 2021 07:55 AM    HDL Cholesterol 41 2021 07:55 AM    LDL, calculated 82 2021 07:55 AM    VLDL, calculated 19 2021 07:55 AM    Triglyceride 96 2021 07:55 AM     Lab Results   Component Value Date/Time    ALT (SGPT) 51 (H) 2021 07:55 AM    AST (SGOT) 30 2021 07:55 AM    Alk.  phosphatase 67 2021 07:55 AM    Bilirubin, total 0.7 2021 07:55 AM     Lab Results   Component Value Date/Time    Creatinine 1.0 2021 07:55 AM     Lab Results   Component Value Date/Time    BUN 14 08/24/2021 07:55 AM     No results found for: MCACR, MCA1, MCA2, MCA3, MCAU, MCAU2, MCALPOCT     Assessment:   Patient is a 45year old male who visits RD for insight on how to lose weight and improve his diet. He shares that his wife is active in helping his journey be healthier. He is a  as well as a . He has three sons; a set of twins that are seniors in [address] [place] [address] and a college senior. Patient is a . Patient's activity consists of 10,000 steps on the job daily. Patient's sleeping habits need improvement. Food & Nutrition: Patient eats 3 meals a day and snacks. Breakfast may consist of bagel, chicken and biscuit. A lunch choice may be chicken sandwich, fast food  Dinner choices consist of chicken, vegetables. Alcohol consumption may be 2-3 beers a night. .       Estimate Needs   Calories: 4484-6939  Protein:  Carbs: <175 Fat:    Kcal/day  g/day  g/day  g/day                            Nutrition Diagnosis Overweight/obesity related to excessive energy intake as evidenced by a BMI of 39. Undesirable food choices as evidenced by patients food recall; patient may choose fast food and convenience food. Disordered eating pattern;     Nutrition Intervention &  Education: Encouraged pt to drink >64 ounces of only calorie free or very low calorie/carb beverages only. Educated pt on all carbohydrates found in foods and encouraged no more than 35-40 gm/meal and 15-20 gm/snack. Patient was educated on the importance of making lifestyle changes such as:  1. Eating off a smaller plate and drinking from smaller glasses. 2. Increasing activity. 3. Menu planning and tracking. Recommend My Fitness Pal for tracking. 4. The importance of eating breakfast.  5. Appropriate snacks. 6. Portion control. 7. The importance of good sleeping habits. 8. How to read a label.   Patient was encouraged to do this before purchasing and consuming an item. Handouts Provided: [x]  Carbohydrates  [x]  Protein  [x]  Non-starchy Vegatbles  [x]  Food Label  [x]  Meal and Snack Ideas  [x]  Food Journals []  Diabetes  []  Cholesterol  []  Sodium  [x]  Gen Nutr Guidelines  []  SBGM Guidelines  []  Others:   Information Reviewed with: Patient    Readiness to Change Stage:   []  Pre-contemplative    []  Contemplative  []  Preparation               [x]  Action                  []  Maintenance   Potential Barriers to Learning: []  Decline in memory    []  Language barrier   []  Other:  []  Emotional                  []  Limited mobility  []  Lack of motivation     [] Vision, hearing or cognitive impairment   Expected Compliance: Good. Nutritional Goal - To promote lifestyle changes to result in:    [x]  Weight loss  []  Improved diabetic control  []  Decreased cholesterol levels  []  Decreased blood pressure  []  Weight maintenance []  Preventing any interactions associated with food allergies  []  Adequate weight gain toward goal weight  []  Other:        Patient Goals:   Patient desires to lose weight and improve his overall health for good by improving his lifestyle choices. Dietitian Signature:  Tong Tapia RD Date: 09/15/2021   Follow-up: Scheduled:  10/13/2021  2:30 pm

## 2021-09-27 ENCOUNTER — OFFICE VISIT (OUTPATIENT)
Dept: CARDIOLOGY CLINIC | Age: 51
End: 2021-09-27
Payer: COMMERCIAL

## 2021-09-27 VITALS
BODY MASS INDEX: 38.37 KG/M2 | HEIGHT: 70 IN | HEART RATE: 68 BPM | WEIGHT: 268 LBS | SYSTOLIC BLOOD PRESSURE: 148 MMHG | OXYGEN SATURATION: 97 % | DIASTOLIC BLOOD PRESSURE: 100 MMHG

## 2021-09-27 DIAGNOSIS — I10 PRIMARY HYPERTENSION: ICD-10-CM

## 2021-09-27 DIAGNOSIS — R07.9 CHEST PAIN, UNSPECIFIED TYPE: Primary | ICD-10-CM

## 2021-09-27 PROCEDURE — 93000 ELECTROCARDIOGRAM COMPLETE: CPT | Performed by: INTERNAL MEDICINE

## 2021-09-27 PROCEDURE — 99214 OFFICE O/P EST MOD 30 MIN: CPT | Performed by: INTERNAL MEDICINE

## 2021-09-27 NOTE — LETTER
9/27/2021    Patient: Chika Adams   YOB: 1970   Date of Visit: 9/27/2021     Kun Benton, [address] [address]  [address] [facility] [address]    Dear Kun Benton DNP,      Thank you for referring Mr. Diya Ramon to [facility] for evaluation. My notes for this consultation are attached. If you have questions, please do not hesitate to call me. I look forward to following your patient along with you.       Sincerely,    Rebecca Powell MD

## 2021-09-27 NOTE — PROGRESS NOTES
[facility]    Mr. Briana Jane is 45 y.o. male with a history of CAD: Hypertension, hyperlipidemia, obesity, sleep apnea    Patient is here today for follow-up appointment  Patient is very happy that his functional status has improved significantly. He is able to perform activity of daily living, exercise without any symptoms to suggest angina. He has not used any nitroglycerin since last time. He is taking all his medication as prescribed. He feels more energetic. He has started seeing dietitian. Denies any nausea, vomiting, abdominal pain, fever, chills, sputum production. No hematuria or other bleeding complaints    Past Medical History:   Diagnosis Date    Asthma     Calculus of kidney     Coronary artery disease involving native coronary artery of native heart without angina pectoris 8/9/2021    HLD (hyperlipidemia)     Hypertension     Hypokalemia 9/17/2014    Obesity     Obesity (BMI 30-39.9) 2/5/2018    Paresthesias/numbness 6/13/2018    Primary hypertension 6/13/2018    S/P cardiac cath 11/2020    Borderline mid LAD and mid LCx ( iFR of both lesion, not significant)     Sleep apnea     not using CPAP    Venous insufficiency of both lower extremities 1/15/2018       Review of Systems:  Cardiac symptoms as noted above in HPI. All others negative. Denies fatigue, malaise, skin rash, joint pain, blurring vision, photophobia, neck pain, hemoptysis, chronic cough, nausea, vomiting, hematuria, burning micturition, BRBPR, chronic headaches. Current Outpatient Medications   Medication Sig    atorvastatin (LIPITOR) 40 mg tablet Take 1 Tablet by mouth daily.  metoprolol succinate (TOPROL-XL) 25 mg XL tablet TAKE 1 TABLET BY MOUTH TWICE A DAY    potassium chloride (Klor-Con M20) 20 mEq tablet Take 1 Tablet by mouth daily.  LORazepam (ATIVAN) 0.5 mg tablet Take 1 Tablet by mouth nightly as needed for Anxiety.  Max Daily Amount: 0.5 mg.    chlorthalidone (HYGROTON) 25 mg tablet TAKE 1 TABLET BY MOUTH EVERY DAY    aspirin delayed-release 81 mg tablet Take 1 Tab by mouth daily.  isosorbide mononitrate ER (IMDUR) 30 mg tablet Take 1 Tab by mouth every morning.  metoprolol succinate (TOPROL-XL) 50 mg XL tablet Take 1 Tab by mouth two (2) times a day.  nitroglycerin (NITROSTAT) 0.4 mg SL tablet 1 Tab by SubLINGual route every five (5) minutes as needed for Chest Pain. Up to 3 doses. No current facility-administered medications for this visit. Past Surgical History:   Procedure Laterality Date    HX HEENT      tonsillectomy    HX LITHOTRIPSY  10/9/2010    Left upper 3rd ureteral calculus    HX ORTHOPAEDIC      Left shoulder       Allergies and Sensitivities:  Allergies   Allergen Reactions    Pcn [Penicillins] Not Reported This Time    Sulfa (Sulfonamide Antibiotics) Other (comments)       Family History:  No family history on file. Social History:  Social History     Tobacco Use    Smoking status: Never Smoker    Smokeless tobacco: Never Used   Substance Use Topics    Alcohol use: Yes     Alcohol/week: 2.0 standard drinks     Types: 2 Cans of beer per week     Comment: occasionally     Drug use: No     He  reports that he has never smoked. He has never used smokeless tobacco.  He  reports current alcohol use of about 2.0 standard drinks of alcohol per week. Physical Exam:  BP Readings from Last 3 Encounters:   08/31/21 139/89   06/28/21 (!) 140/82   12/04/20 (!) 142/92         Pulse Readings from Last 3 Encounters:   08/31/21 70   06/28/21 96   12/04/20 72          Wt Readings from Last 3 Encounters:   08/31/21 123.6 kg (272 lb 6.4 oz)   06/28/21 125.6 kg (277 lb)   12/04/20 124.3 kg (274 lb)       Constitutional: Oriented to person, place, and time. HENT: Head: Normocephalic and atraumatic  Neck: No JVD present. Carotid bruit is not appreciated. Cardiovascular: Regular rhythm.    No murmur, gallop or rubs appreciated  Lung: Breath sounds normal. No respiratory distress. No ronchi or rales appreciated  Abdominal: No tenderness. No rebound and no guarding. Musculoskeletal: There is trace + lower extremity edema. No cynosis    LABS:   Lab Results   Component Value Date/Time    Sodium 142 08/24/2021 07:55 AM    Potassium 3.3 (L) 08/24/2021 07:55 AM    Chloride 102 08/24/2021 07:55 AM    CO2 30 08/24/2021 07:55 AM    Glucose 128 (H) 08/24/2021 07:55 AM    BUN 14 08/24/2021 07:55 AM    Creatinine 1.0 08/24/2021 07:55 AM     Lipids Latest Ref Rng & Units 8/24/2021 1/10/2020 12/12/2018   Chol, Total 110 - 200 mg/dL 142 224(H) 213(H)   HDL >=40 mg/dL 41 38(L) 39(L)   LDL 50 - 99 mg/dL 82 156(H) 141(H)   Trig 40 - 149 mg/dL 96 148 167(H)   Some recent data might be hidden     Lab Results   Component Value Date/Time    ALT (SGPT) 51 (H) 08/24/2021 07:55 AM     No results found for: HBA1C, NXC1QNGZ, RTO4KBXZ, PFD1UWKC  Lab Results   Component Value Date/Time    TSH 0.47 12/12/2018 08:23 AM       EKG  Sinus rhythm at 66 bpm.  No pathologic Q waves. No ST changes of ischemia. ECHO (09/20)  Left Ventricle  Normal cavity size and systolic function (ejection fraction normal). Moderately increased wall thickness. Wall motion: normal. The estimated EF is 55 - 60%. Visually measured ejection fraction. There is mild (grade 1) left ventricular diastolic dysfunction. Wall Scoring  The left ventricular wall motion is normal.             Left Atrium  Normal cavity size. Left Atrium volume index is 29 mL/m2. Right Ventricle  Normal global systolic function. Mildly dilated right ventricle. Right Atrium  Mildly dilated right atrium. Aortic Valve  Normal valve structure, trileaflet valve structure, no stenosis and no regurgitation. Mitral Valve  Normal valve structure and no stenosis. Trace regurgitation. Tricuspid Valve  Normal valve structure and no stenosis. Trace regurgitation.     Pulmonic Valve  Pulmonic valve normal doppler findings. Normal valve structure and no stenosis. Mild regurgitation. Aorta  Mildly dilated aortic root; diameter is 3.6 cm. Pulmonary Artery  Pulmonary arterial systolic pressure (PASP) is 29 mmHg. Pulmonary hypertension not suggested by Doppler findings. STRESS TEST (010/20)  · Baseline ECG: Normal sinus rhythm. · Inconclusive stress test.  · Moderate risk Melendez treadmill score. And exercise for 7 minutes with heart rate achieved 120 bpm when patient started having 7 out of 10 chest pain with EKG changes. Post exercise nuclear images was not performed as he was not able to achieve target heart rate    CATHETERIZATION (11/20)  Left Main    Separate LAD and LCx ostia    Left Anterior Descending    Proximal LAD ectasia. Mild proximal disease. Mid LAD after D2 has eccentric borderline 60% narrowing. Otherwise LAD is normal IFR of mid LAD lesion was 0.93, suggesting physiologically not significant lesion D1: Ostial 60%, small caliber vessel D2: Ostial 85% stenosis, small-medium caliber vessel however supplying very small myocardial wall    Left Circumflex    Mid LCx borderline 50-60% tubular narrowing. IFR of mid LCx lesion was 0.97 suggesting physiologically not significant stenosis OM1: Mild proximal luminal irregularities otherwise normal    Right Coronary Artery    Large ectatic vessel No obstructive disease noted. Mild diffuse luminal irregularities      IMPRESSION & PLAN:  Mr. Danny Ly is 45 y.o. male with multiple medical problem    CAD:  Patient had a cardiac catheterization in November 2020 which showed two-vessel CAD, IFR was not physiologically significant  Since then patient has remained on medical management with almost resolution of his anginal symptoms  No use of nitroglycerin since last time.     Overall feels much better and has no symptoms to suggest angina or heart failure  Continue with beta-blocker and Imdur  Continue with aspirin and statin    Hypertension:  BP 144/100. .  Currently taking beta-blocker and Imdur. At home usually his blood pressure is 414/64 systolic. Stressed out for this appointment today. He will keep checking his blood pressure at home regularly    Hyperlipidemia:  Based on his cholesterol profile in January 2020, his 10-year risk of having ASCVD is 7.8%. Added atorvastatin 40 mg daily in September 2020. LDL in 08/2021 was 82. Continue current medication    Obesity: He weighs 277-->267 pounds with BMI of 38  He has started working with dietitian to help him lose weight. This plan was discussed with patient who is in agreement. Thank you for allowing me to participate in patient care. Please feel free to call me if you have any question or concern. Mario Hopkins MD  Please note: This document has been produced using voice recognition software. Unrecognized errors in transcription may be present.

## 2021-10-13 ENCOUNTER — HOSPITAL ENCOUNTER (OUTPATIENT)
Dept: NUTRITION | Age: 51
Discharge: HOME | End: 2021-10-13
Payer: COMMERCIAL

## 2021-10-13 PROCEDURE — 97803 MED NUTRITION INDIV SUBSEQ: CPT

## 2021-10-14 NOTE — PROGRESS NOTES
NUTRITION  FOLLOW-UP TREATMENT NOTE  Patient Name: Aisha Leiva         Date: 10/13/2021  : 1970    YES/NO Patient  Verified  Diagnosis: Obesity      Total Treatment Time (min):   30     SUBJECTIVE/ASSESSMENT:  Patient is doing well. Patient is pleased that he needs to tighten his belt. He is adding the healthy fats to his routine. Changes in medication or medical history? Any new allergies, surgeries or procedures? YES/NO    If yes, update Summary List   None reported        Current Wt: 273# Previous Wt: 275# Wt Change: 2#     Achievement of Goals: 1.  < 75 grams carbs/ day. 2.  Patients lipid levels are improving. 3.  Patient has lost 2#. Patient Education:  [x]  Review current plan with patient   []  Other:    Handouts/  Information Provided: []  Carbohydrates  []  Protein  []  Fiber  []  Serving Sizes  []  Fluids  []  General guidelines []  Diabetes  []  Cholesterol  []  Sodium  []  SBGM  []  Food Journals  []  Others:      New Patient Goals: 1. Continue current plan.      PLAN    [x]  Continue on current plan []  Follow-up PRN   []  Discharge due to :    [x]  Next appt: 2021  2:30 pm      Dietitian: Hilaria Gilliam RD    Date: 10/13/2021

## 2021-12-01 ENCOUNTER — HOSPITAL ENCOUNTER (OUTPATIENT)
Dept: NUTRITION | Age: 51
End: 2021-12-01

## 2021-12-09 RX ORDER — ASPIRIN 81 MG/1
TABLET ORAL
Qty: 30 TABLET | Refills: 6 | Status: SHIPPED | OUTPATIENT
Start: 2021-12-09 | End: 2022-10-18

## 2021-12-09 RX ORDER — ISOSORBIDE MONONITRATE 30 MG/1
TABLET, EXTENDED RELEASE ORAL
Qty: 30 TABLET | Refills: 6 | Status: SHIPPED | OUTPATIENT
Start: 2021-12-09 | End: 2022-07-14

## 2021-12-10 RX ORDER — ATORVASTATIN CALCIUM 40 MG/1
40 TABLET, FILM COATED ORAL DAILY
Qty: 30 TABLET | Refills: 6 | Status: SHIPPED | OUTPATIENT
Start: 2021-12-10 | End: 2022-09-12 | Stop reason: DRUGHIGH

## 2022-01-12 ENCOUNTER — TELEPHONE (OUTPATIENT)
Dept: PHYSICAL THERAPY | Age: 52
End: 2022-01-12

## 2022-01-12 ENCOUNTER — HOSPITAL ENCOUNTER (OUTPATIENT)
Dept: NUTRITION | Age: 52
End: 2022-01-12

## 2022-02-01 DIAGNOSIS — I10 ESSENTIAL HYPERTENSION: ICD-10-CM

## 2022-02-01 DIAGNOSIS — R60.0 BILATERAL LOWER EXTREMITY EDEMA: ICD-10-CM

## 2022-02-01 DIAGNOSIS — R73.09 ELEVATED GLUCOSE LEVEL: ICD-10-CM

## 2022-02-01 DIAGNOSIS — E87.6 HYPOKALEMIA: ICD-10-CM

## 2022-02-01 DIAGNOSIS — R74.01 ELEVATED ALT MEASUREMENT: ICD-10-CM

## 2022-02-01 DIAGNOSIS — Z51.81 THERAPEUTIC DRUG MONITORING: ICD-10-CM

## 2022-02-01 DIAGNOSIS — F41.9 ANXIETY: ICD-10-CM

## 2022-02-16 ENCOUNTER — APPOINTMENT (OUTPATIENT)
Dept: INTERNAL MEDICINE CLINIC | Age: 52
End: 2022-02-16

## 2022-02-17 ENCOUNTER — TELEPHONE (OUTPATIENT)
Dept: INTERNAL MEDICINE CLINIC | Age: 52
End: 2022-02-17

## 2022-02-17 DIAGNOSIS — E87.6 HYPOKALEMIA: Primary | ICD-10-CM

## 2022-02-17 DIAGNOSIS — I10 ESSENTIAL HYPERTENSION: ICD-10-CM

## 2022-02-17 LAB
A-G RATIO,AGRAT: 2.6 RATIO (ref 1.1–2.6)
ALBUMIN SERPL-MCNC: 4.6 G/DL (ref 3.5–5)
ALP SERPL-CCNC: 51 U/L (ref 25–115)
ALT SERPL-CCNC: 63 U/L (ref 5–40)
ANION GAP SERPL CALC-SCNC: 12 MMOL/L (ref 3–15)
AST SERPL W P-5'-P-CCNC: 40 U/L (ref 10–37)
AVG GLU, 10930: 129 MG/DL (ref 91–123)
BILIRUB SERPL-MCNC: 0.9 MG/DL (ref 0.2–1.2)
BUN SERPL-MCNC: 13 MG/DL (ref 6–22)
CALCIUM SERPL-MCNC: 9.7 MG/DL (ref 8.4–10.5)
CHLORIDE SERPL-SCNC: 99 MMOL/L (ref 98–110)
CO2 SERPL-SCNC: 29 MMOL/L (ref 20–32)
CREAT SERPL-MCNC: 0.9 MG/DL (ref 0.5–1.2)
GFRAA, 66117: >60
GFRNA, 66118: >60
GLOBULIN,GLOB: 1.8 G/DL (ref 2–4)
GLUCOSE SERPL-MCNC: 101 MG/DL (ref 70–99)
HBA1C MFR BLD HPLC: 6.1 % (ref 4.8–5.6)
POTASSIUM SERPL-SCNC: 2.9 MMOL/L (ref 3.5–5.5)
PROT SERPL-MCNC: 6.4 G/DL (ref 6.4–8.3)
SODIUM SERPL-SCNC: 140 MMOL/L (ref 133–145)

## 2022-02-17 RX ORDER — SPIRONOLACTONE 25 MG/1
25 TABLET ORAL DAILY
Qty: 90 TABLET | Refills: 0 | Status: SHIPPED | OUTPATIENT
Start: 2022-02-17 | End: 2022-05-18

## 2022-02-17 NOTE — TELEPHONE ENCOUNTER
Patient reached and made aware the the previous message per Dr. Terrance Bruce. Patient states he has the potassium tabs and he will start taking them now (states he only takes in summer). Patient states he will discuss this with you at his upcoming appt next week.

## 2022-02-17 NOTE — TELEPHONE ENCOUNTER
Received call from [facility] lab reporting a critical lab for the patient. Potassium was 2.9 obtained yesterday at 11am. Please advise.

## 2022-02-17 NOTE — TELEPHONE ENCOUNTER
He needs to discontinue chlorthalidone as this is most likely the culprit for his hypokalemia of 2.9    Pls inquire if he is taking the Klor con 20meq daily. If not he needs to start taking them today x5 days then stop. If he is, he needs to take Klor 20meq BID x3 days then stop. I am escribing aldactone, potassium sparing BP med, to replace the chlorthalidone.      Thank you

## 2022-02-19 LAB
AMPHETAMINE, 737686: NEGATIVE NG/ML
BARBITURATES: NEGATIVE NG/ML
BENZODIAZEPINES, R9BE1T: NEGATIVE NG/ML
BUPRENORPHINE, URINE: NEGATIVE NG/ML
CANNABINOID, DR86L: NEGATIVE NG/ML
COCAINE/METABOLITES, MDS2T: NEGATIVE NG/ML
CREATININE URINE,3297223: 151.8 MG/DL (ref 20–300)
METHADONE, 791633: NEGATIVE NG/ML
OPIATE, DR88L: NEGATIVE NG/ML
OXYCODONE/OXYMORPHONE-PM: NEGATIVE NG/ML
PH UR STRIP: 6.6 [PH] (ref 4.5–8.9)
PHENCYCLIDINE: NEGATIVE NG/ML
PLEASE NOTE, 62788: NORMAL
PROPOXYPHENE, 791635: NEGATIVE NG/ML

## 2022-02-24 ENCOUNTER — OFFICE VISIT (OUTPATIENT)
Dept: INTERNAL MEDICINE CLINIC | Age: 52
End: 2022-02-24
Payer: COMMERCIAL

## 2022-02-24 VITALS
WEIGHT: 277.6 LBS | TEMPERATURE: 98 F | RESPIRATION RATE: 18 BRPM | HEIGHT: 70 IN | OXYGEN SATURATION: 99 % | SYSTOLIC BLOOD PRESSURE: 132 MMHG | BODY MASS INDEX: 39.74 KG/M2 | HEART RATE: 80 BPM | DIASTOLIC BLOOD PRESSURE: 89 MMHG

## 2022-02-24 DIAGNOSIS — F41.9 ANXIETY: ICD-10-CM

## 2022-02-24 DIAGNOSIS — R60.0 BILATERAL LOWER EXTREMITY EDEMA: ICD-10-CM

## 2022-02-24 DIAGNOSIS — R73.03 PREDIABETES: ICD-10-CM

## 2022-02-24 DIAGNOSIS — G47.30 SLEEP APNEA, UNSPECIFIED TYPE: ICD-10-CM

## 2022-02-24 DIAGNOSIS — Z78.9 ALCOHOL CONSUMPTION OF ONE TO FOUR DRINKS PER DAY: ICD-10-CM

## 2022-02-24 DIAGNOSIS — E78.2 MIXED HYPERLIPIDEMIA: ICD-10-CM

## 2022-02-24 DIAGNOSIS — R79.89 ELEVATED LFTS: ICD-10-CM

## 2022-02-24 DIAGNOSIS — I10 ESSENTIAL HYPERTENSION: Primary | ICD-10-CM

## 2022-02-24 DIAGNOSIS — E87.6 HYPOKALEMIA: ICD-10-CM

## 2022-02-24 DIAGNOSIS — E66.9 OBESITY (BMI 30-39.9): ICD-10-CM

## 2022-02-24 PROBLEM — R74.01 ELEVATED ALT MEASUREMENT: Status: RESOLVED | Noted: 2021-08-31 | Resolved: 2022-02-24

## 2022-02-24 PROCEDURE — 99215 OFFICE O/P EST HI 40 MIN: CPT | Performed by: NURSE PRACTITIONER

## 2022-02-24 RX ORDER — LORAZEPAM 0.5 MG/1
0.5 TABLET ORAL
Qty: 30 TABLET | Refills: 1 | Status: SHIPPED | OUTPATIENT
Start: 2022-02-24 | End: 2022-09-07 | Stop reason: SDUPTHER

## 2022-02-24 NOTE — PROGRESS NOTES
Chief Complaint   Patient presents with    Follow-up     6 months    Labs     completed 2/16/2022     1. \"Have you been to the ER, urgent care clinic since your last visit? Hospitalized since your last visit? \" No    2. \"Have you seen or consulted any other health care providers outside of the [address] [place] [address] since your last visit? \" No.     3. For patients aged 39-70: Has the patient had a colonoscopy / FIT/ Cologuard? No      If the patient is female:    4. For patients aged 41-77: Has the patient had a mammogram within the past 2 years? NA - based on age or sex      11. For patients aged 21-65: Has the patient had a pap smear?  NA - based on age or sex

## 2022-02-24 NOTE — PROGRESS NOTES
[facility] [address] [facility]  [place], [address]  [phone] JVRQAC/[phone] fax    2/24/2022    HPI:   Chris Verdin 1970 is a pleasant [address] male. He works as a . He is  with 3 sons. Past Medical History:   Diagnosis Date    Alcohol consumption of one to four drinks per day 8/31/2021    Anxiety 8/31/2021    Asthma     Bilateral lower extremity edema 8/31/2021    Calculus of kidney     Coronary artery disease involving native coronary artery of native heart without angina pectoris 8/9/2021    Elevated LFTs 2/24/2022    HLD (hyperlipidemia)     Hypertension     Hypokalemia 9/17/2014    Obesity (BMI 30-39.9) 2/5/2018    Paresthesias/numbness 6/13/2018    Prediabetes 2/24/2022    Primary hypertension 6/13/2018    S/P cardiac cath 11/2020    Borderline mid LAD and mid LCx ( iFR of both lesion, not significant)     Sleep apnea     not using CPAP    Venous insufficiency of both lower extremities 1/15/2018     Past Surgical History:   Procedure Laterality Date    HX HEENT      tonsillectomy    HX LITHOTRIPSY  10/9/2010    Left upper 3rd ureteral calculus    HX ORTHOPAEDIC      Left shoulder     Current Outpatient Medications   Medication Sig    LORazepam (ATIVAN) 0.5 mg tablet Take 1 Tablet by mouth nightly as needed for Anxiety. Max Daily Amount: 0.5 mg.    atorvastatin (LIPITOR) 40 mg tablet Take 1 Tablet by mouth daily.  isosorbide mononitrate ER (IMDUR) 30 mg tablet TAKE 1 TABLET BY MOUTH EVERY DAY IN THE MORNING    aspirin delayed-release 81 mg tablet TAKE 1 TABLET BY MOUTH EVERY DAY    metoprolol succinate (TOPROL-XL) 25 mg XL tablet TAKE 1 TABLET BY MOUTH TWICE A DAY    nitroglycerin (NITROSTAT) 0.4 mg SL tablet 1 Tab by SubLINGual route every five (5) minutes as needed for Chest Pain. Up to 3 doses.  spironolactone (ALDACTONE) 25 mg tablet Take 1 Tablet by mouth daily.  (Patient not taking: Reported on 2/24/2022)     No current facility-administered medications for this visit. Allergies and Intolerances: Allergies   Allergen Reactions    Pcn [Penicillins] Not Reported This Time    Sulfa (Sulfonamide Antibiotics) Other (comments)       Family History:   No family history on file. Social History:   He  reports that he has never smoked. He has never used smokeless tobacco.   Social History     Substance and Sexual Activity   Alcohol Use Yes    Alcohol/week: 2.0 standard drinks    Types: 2 Cans of beer per week    Comment: occasionally      Immunization History: There is no immunization history on file for this patient. Todays concern:  1. Hypertension. Did not stop chlorthalidone on 2/17/22 as directed by PCP but has been taking Klor con daily. He did not start Aldactone therapy. 2. Hypokalemia. Has dealt with low levels for years. Has supply of klor con on hand, prescribed to him by cardio, to take during the summer months when he develops cramps in his lower extremities or tingling in his fingers from excessive sweating. 3. Bilateral lower extremity edema. Chlorthalidone effective for swelling. Usually worse during the day and resolves at night. 4. Anxiety: Has suffered with anxiety and panic attacks for many years. He takes Ativan as needed; approximately 1 time a week. He last filled ativan in Aug 2021 and has not completed the original RX, has approx 10 pills remaining. Hypertension/CAD/cholesterol: Seeing Dr. Sharlene Blunt, cardiology. Checks blood pressures at home periodically with the systolic being in the 809Y and diastolic in the 68V. He does have nitroglycerin available to him but has not required this medication. Alcohol consumption: Drinks 2-3 beers per day, 5 days per week. Sleep apnea: Diagnosed with central sleep apnea years ago. Does not utilize a CPAP due to feeling claustrophobic. He does not sleep well at night.   His wife tells him he gasps for air while he is sleeping. His snoring is disturbing so him and his spouse sleep in separate rooms. He is not wishing to see a pulmonologist or sleep specialist at this time. Review of Systems:   As above included in HPI. Otherwise 11 point review of systems negative including constitutional, skin, HENT, eyes, respiratory, cardiovascular, gastrointestinal, genitourinary, musculoskeletal, endocrine, hematologic, allergy, and neurologic. Physical:   Visit Vitals  /89   Pulse 80   Temp 98 °F (36.7 °C) (Temporal)   Resp 18   Ht 5' 10\" (1.778 m)   Wt 277 lb 9.6 oz (125.9 kg)   SpO2 99%   BMI 39.83 kg/m²      Wt Readings from Last 3 Encounters:   02/24/22 277 lb 9.6 oz (125.9 kg)   09/27/21 268 lb (121.6 kg)   08/31/21 272 lb 6.4 oz (123.6 kg)       Exam:   Physical Exam  Vitals and nursing note reviewed. Constitutional:       Appearance: Normal appearance. He is obese. Comments: Morbidly    HENT:      Head: Normocephalic and atraumatic. Right Ear: External ear normal.      Left Ear: External ear normal.   Eyes:      Extraocular Movements: Extraocular movements intact. Conjunctiva/sclera: Conjunctivae normal.   Neck:      Vascular: No carotid bruit. Cardiovascular:      Rate and Rhythm: Normal rate and regular rhythm. Pulses: Normal pulses. Heart sounds: Normal heart sounds. Comments: 1+ edema noted to bilateral lower extremities  Pulmonary:      Effort: Pulmonary effort is normal. No respiratory distress. Breath sounds: Normal breath sounds. No wheezing. Musculoskeletal:         General: Normal range of motion. Cervical back: Normal range of motion and neck supple. Comments: Gait stable   Skin:     General: Skin is warm and dry. Neurological:      General: No focal deficit present. Mental Status: He is alert and oriented to person, place, and time.    Psychiatric:         Mood and Affect: Mood normal.         Behavior: Behavior normal. Thought Content: Thought content normal.         Judgment: Judgment normal.       Review of Data:  Potassium 2.9  A1c 6.1  ALT 63; AST 40      Plan:    ICD-10-CM ICD-9-CM    1. Essential hypertension  I10 401.9    2. Hypokalemia  K18.6 220.4 METABOLIC PANEL, COMPREHENSIVE      METABOLIC PANEL, COMPREHENSIVE      METABOLIC PANEL, COMPREHENSIVE   3. Bilateral lower extremity edema  R60.0 782.3    4. Mixed hyperlipidemia  E78.2 272.2    5. Anxiety  F41.9 300.00 LORazepam (ATIVAN) 0.5 mg tablet   6. Alcohol consumption of one to four drinks per day  Z78.9 V69.8    7. Elevated LFTs  R79.89 790.6    8. Prediabetes  R73.03 790.29    9. Sleep apnea, unspecified type  G47.30 780.57    10. Obesity (BMI 30-39. 9)  E66.9 278.00      -Essential hypertension  DC Chlorthalidone, DC Klor con, Start Aldactone. Continue Imdur and Toprol  Limit sodium in diet to 2g/d  Avoid pork  Initiate cardio exercise  Weight reduction  Increase water intake  Check BP and report readings greater than 139/89 to office  Much discussion was had with pt in reference to following medical advise to avoid health complications. Follow up with cardio as scheduled. -Hypokalemia  Potassium level 2.9  Took Klor Con 20meq for the last 7 days while taking Chlorthalidone. Initiated Aldactone and dc'd chlorthalidone. Recheck CMP today and again 4 weeks    -Mixed hyperlipidemia  continue Atorvastatin 40mg  Reduce fried fatty or oily foods in diet  Limit red meats and alcohol. Limit fast food  Work on weight reduction    -Coronary Artery Disease  Continue Imdur and aspirin  Follow-up with cardiology as scheduled    -Bilateral lower extremity edema  continue chlorthalidone  Elevate lower extremities above heart  Avoid sitting with legs dependent more than 30 minutes at a time  Increase walking exercise  Wear compression socks while awake (15 to 30 mmHg)  Limit sodium in diet  Avoid pork    -Anxiety  Continue Ativan 0.5 mg on an as-needed basis.   Will not increase dose nor will prescribe more than 30 tabs at a time. Educated patient on the addictive component with long-term use of Ativan    -Alcohol consumption 1-4 drinks per day  He consumes 2-3 beers per day, 5 days per week  Encouraged reduction of alcohol intake    -Elevated LFTs  ALT 63; AST 40  Discussed possible causes of elevated LFTs to include excessive alcohol consumption. Encouraged reduction of consumption.     -Prediabetes  A1c 6.1  A1c goal <7  Instructed patient to cut back on carbs and sugary foods  Increase exercise  Encourage weight loss  Increase water intake    Discussed consequences of poor management with vascular stress, increased occurrence of neuropathy causing pain and disability, decreased circulation and increased occurrence of infection resulting in extremity amputation and general illness, and increased incidence of death by renal failure, stroke and MI.     -Sleep apnea  Discussed the importance of treating sleep apnea  Encourage patient to do his research on line for different devices that may not cause him to feel claustrophobic  Educated patient the seriousness of having sleep apnea and the possibility of mortality.    -Obesity  9lb weight gain in 6 months. BMI is out of normal parameters and plan is as follows: Discussed in great detail on diet, portion control, exercise, avoiding foods high in sugar, carbs and starches, fatty/greasy foods and to eat until satisfied not til full. I have counseled this patient on diet and exercise regimens as well. Follow up 6 months with labs (A1c, CMP)      Dr. Sarah Epps, CLAUDETTEP-C, DNP  [facility]         The total time 45 minutes. At least 50% of that time was spent in counseling and/or coordination of care. My summary of patient counseling and coordination of care includes reviewing medical record, assessing patient, placing orders, and discussing plan of care with patient.  Prior to seeing this patient, I reviewed records, including previously completed appointments/records, reviewed specialty records in [facility], and updated visits from other providers since I saw the patient last.

## 2022-02-25 LAB
A-G RATIO,AGRAT: 3.1 RATIO (ref 1.1–2.6)
ALBUMIN SERPL-MCNC: 4.9 G/DL (ref 3.5–5)
ALP SERPL-CCNC: 52 U/L (ref 25–115)
ALT SERPL-CCNC: 56 U/L (ref 5–40)
ANION GAP SERPL CALC-SCNC: 13 MMOL/L (ref 3–15)
AST SERPL W P-5'-P-CCNC: 36 U/L (ref 10–37)
BILIRUB SERPL-MCNC: 0.9 MG/DL (ref 0.2–1.2)
BUN SERPL-MCNC: 15 MG/DL (ref 6–22)
CALCIUM SERPL-MCNC: 9.7 MG/DL (ref 8.4–10.5)
CHLORIDE SERPL-SCNC: 101 MMOL/L (ref 98–110)
CO2 SERPL-SCNC: 28 MMOL/L (ref 20–32)
CREAT SERPL-MCNC: 0.9 MG/DL (ref 0.5–1.2)
GFRAA, 66117: >60
GFRNA, 66118: >60
GLOBULIN,GLOB: 1.6 G/DL (ref 2–4)
GLUCOSE SERPL-MCNC: 120 MG/DL (ref 70–99)
POTASSIUM SERPL-SCNC: 3.1 MMOL/L (ref 3.5–5.5)
PROT SERPL-MCNC: 6.5 G/DL (ref 6.4–8.3)
SODIUM SERPL-SCNC: 142 MMOL/L (ref 133–145)

## 2022-02-28 NOTE — PROGRESS NOTES
Patient reached and made aware K+ level is improving, he is to continue taking the aldactone and CMP will be rechecked in 4 weeks per Dr. Roberta Jaime. Patient verbalized understanding.

## 2022-02-28 NOTE — PROGRESS NOTES
Left message for patient to call the office. Re:  K+(potassium) level improving - level 3. 1. continue aldactone.  Repeat CMP 1 month

## 2022-03-02 RX ORDER — METOPROLOL SUCCINATE 25 MG/1
25 TABLET, EXTENDED RELEASE ORAL 2 TIMES DAILY
Qty: 60 TABLET | Refills: 6 | Status: SHIPPED | OUTPATIENT
Start: 2022-03-02 | End: 2022-03-28 | Stop reason: ALTCHOICE

## 2022-03-18 PROBLEM — F41.9 ANXIETY: Status: ACTIVE | Noted: 2021-08-31

## 2022-03-18 PROBLEM — Z28.9 COVID-19 VACCINATION NOT DONE: Status: ACTIVE | Noted: 2021-08-31

## 2022-03-18 PROBLEM — R60.0 BILATERAL LOWER EXTREMITY EDEMA: Status: ACTIVE | Noted: 2021-08-31

## 2022-03-19 PROBLEM — I25.10 CORONARY ARTERY DISEASE INVOLVING NATIVE CORONARY ARTERY OF NATIVE HEART WITHOUT ANGINA PECTORIS: Status: ACTIVE | Noted: 2021-08-09

## 2022-03-19 PROBLEM — I10 ESSENTIAL HYPERTENSION: Status: ACTIVE | Noted: 2018-06-13

## 2022-03-19 PROBLEM — Z51.81 THERAPEUTIC DRUG MONITORING: Status: ACTIVE | Noted: 2021-08-31

## 2022-03-19 PROBLEM — R73.03 PREDIABETES: Status: ACTIVE | Noted: 2022-02-24

## 2022-03-19 PROBLEM — Z78.9 ALCOHOL CONSUMPTION OF ONE TO FOUR DRINKS PER DAY: Status: ACTIVE | Noted: 2021-08-31

## 2022-03-19 PROBLEM — R79.89 ELEVATED LFTS: Status: ACTIVE | Noted: 2022-02-24

## 2022-03-19 PROBLEM — I87.2 VENOUS INSUFFICIENCY OF BOTH LOWER EXTREMITIES: Status: ACTIVE | Noted: 2018-01-15

## 2022-03-19 PROBLEM — E78.2 MIXED HYPERLIPIDEMIA: Status: ACTIVE | Noted: 2021-08-31

## 2022-03-19 PROBLEM — E66.9 OBESITY (BMI 30-39.9): Status: ACTIVE | Noted: 2018-02-05

## 2022-03-21 ENCOUNTER — APPOINTMENT (OUTPATIENT)
Dept: INTERNAL MEDICINE CLINIC | Age: 52
End: 2022-03-21

## 2022-03-21 DIAGNOSIS — E87.6 HYPOKALEMIA: ICD-10-CM

## 2022-03-22 LAB
A-G RATIO,AGRAT: 2 RATIO (ref 1.1–2.6)
ALBUMIN SERPL-MCNC: 4.2 G/DL (ref 3.5–5)
ALP SERPL-CCNC: 55 U/L (ref 25–115)
ALT SERPL-CCNC: 49 U/L (ref 5–40)
ANION GAP SERPL CALC-SCNC: 8 MMOL/L (ref 3–15)
AST SERPL W P-5'-P-CCNC: 26 U/L (ref 10–37)
BILIRUB SERPL-MCNC: 0.8 MG/DL (ref 0.2–1.2)
BUN SERPL-MCNC: 13 MG/DL (ref 6–22)
CALCIUM SERPL-MCNC: 9.8 MG/DL (ref 8.4–10.5)
CHLORIDE SERPL-SCNC: 104 MMOL/L (ref 98–110)
CO2 SERPL-SCNC: 31 MMOL/L (ref 20–32)
CREAT SERPL-MCNC: 1 MG/DL (ref 0.5–1.2)
GFRAA, 66117: >60
GFRNA, 66118: >60
GLOBULIN,GLOB: 2.1 G/DL (ref 2–4)
GLUCOSE SERPL-MCNC: 103 MG/DL (ref 70–99)
POTASSIUM SERPL-SCNC: 3.8 MMOL/L (ref 3.5–5.5)
PROT SERPL-MCNC: 6.3 G/DL (ref 6.4–8.3)
SODIUM SERPL-SCNC: 143 MMOL/L (ref 133–145)

## 2022-03-24 ENCOUNTER — TELEPHONE (OUTPATIENT)
Dept: INTERNAL MEDICINE CLINIC | Age: 52
End: 2022-03-24

## 2022-03-24 NOTE — TELEPHONE ENCOUNTER
----- Message from Bhaskar Gilmore DNP sent at 3/23/2022  8:06 PM EDT -----  Pls inform pt his K+ level is now normal. He is to continue Aldactone therapy.  Thank you

## 2022-03-25 NOTE — TELEPHONE ENCOUNTER
Patient reached and made aware of lab results and message per Dr. Kathy Boggs. Pt verbalized understanding.

## 2022-03-28 ENCOUNTER — OFFICE VISIT (OUTPATIENT)
Dept: CARDIOLOGY CLINIC | Age: 52
End: 2022-03-28
Payer: COMMERCIAL

## 2022-03-28 VITALS
HEART RATE: 70 BPM | WEIGHT: 277 LBS | OXYGEN SATURATION: 96 % | SYSTOLIC BLOOD PRESSURE: 138 MMHG | DIASTOLIC BLOOD PRESSURE: 96 MMHG | HEIGHT: 70 IN | BODY MASS INDEX: 39.65 KG/M2

## 2022-03-28 DIAGNOSIS — I10 ESSENTIAL HYPERTENSION WITH GOAL BLOOD PRESSURE LESS THAN 140/90: ICD-10-CM

## 2022-03-28 DIAGNOSIS — I25.83 CORONARY ARTERY DISEASE DUE TO LIPID RICH PLAQUE: Primary | ICD-10-CM

## 2022-03-28 DIAGNOSIS — E78.00 PURE HYPERCHOLESTEROLEMIA: ICD-10-CM

## 2022-03-28 DIAGNOSIS — I25.10 CORONARY ARTERY DISEASE DUE TO LIPID RICH PLAQUE: Primary | ICD-10-CM

## 2022-03-28 PROCEDURE — 99214 OFFICE O/P EST MOD 30 MIN: CPT | Performed by: INTERNAL MEDICINE

## 2022-03-28 RX ORDER — CARVEDILOL 6.25 MG/1
6.25 TABLET ORAL 2 TIMES DAILY WITH MEALS
Qty: 60 TABLET | Refills: 6 | Status: SHIPPED | OUTPATIENT
Start: 2022-03-28 | End: 2022-04-25 | Stop reason: DRUGHIGH

## 2022-03-28 NOTE — LETTER
3/28/2022    Patient: Zack Mcmullen   YOB: 1970   Date of Visit: 3/28/2022     Brian Dee, [address] [facility]  [address] [place] [facility] [address]    Dear Brian Dee DNP,      Thank you for referring Mr. Anabella Canales to [facility] for evaluation. My notes for this consultation are attached. If you have questions, please do not hesitate to call me. I look forward to following your patient along with you.       Sincerely,    Nathaniel Gomes MD

## 2022-03-28 NOTE — PROGRESS NOTES
[facility]    Mr. KAMARA is 45 y.o. male with a history of CAD: Hypertension, hyperlipidemia, obesity, sleep apnea    Patient is here today for follow-up appointment  Patient is very happy that he has no cardiac symptoms to report. He denies any chest pain or chest tightness. He has worked on his diet and exercise program however he has not been able to lose weight. He denies any use of nitroglycerin since last visit. He denies any limitation of activities. He takes his medication without any side effect. Blood pressure at home usually 455439 systolic and 0561 diastolic. Past Medical History:   Diagnosis Date    Alcohol consumption of one to four drinks per day 8/31/2021    Anxiety 8/31/2021    Asthma     Bilateral lower extremity edema 8/31/2021    Calculus of kidney     Coronary artery disease involving native coronary artery of native heart without angina pectoris 8/9/2021    Elevated LFTs 2/24/2022    HLD (hyperlipidemia)     Hypertension     Hypokalemia 9/17/2014    Obesity (BMI 30-39.9) 2/5/2018    Paresthesias/numbness 6/13/2018    Prediabetes 2/24/2022    Primary hypertension 6/13/2018    S/P cardiac cath 11/2020    Borderline mid LAD and mid LCx ( iFR of both lesion, not significant)     Sleep apnea     not using CPAP    Venous insufficiency of both lower extremities 1/15/2018       Review of Systems:  Cardiac symptoms as noted above in HPI. All others negative. Denies fatigue, malaise, skin rash, joint pain, blurring vision, photophobia, neck pain, hemoptysis, chronic cough, nausea, vomiting, hematuria, burning micturition, BRBPR, chronic headaches. Current Outpatient Medications   Medication Sig    metoprolol succinate (TOPROL-XL) 25 mg XL tablet Take 1 Tablet by mouth two (2) times a day.  LORazepam (ATIVAN) 0.5 mg tablet Take 1 Tablet by mouth nightly as needed for Anxiety.  Max Daily Amount: 0.5 mg.    spironolactone (ALDACTONE) 25 mg tablet Take 1 Tablet by mouth daily.  atorvastatin (LIPITOR) 40 mg tablet Take 1 Tablet by mouth daily.  isosorbide mononitrate ER (IMDUR) 30 mg tablet TAKE 1 TABLET BY MOUTH EVERY DAY IN THE MORNING    aspirin delayed-release 81 mg tablet TAKE 1 TABLET BY MOUTH EVERY DAY    nitroglycerin (NITROSTAT) 0.4 mg SL tablet 1 Tab by SubLINGual route every five (5) minutes as needed for Chest Pain. Up to 3 doses. No current facility-administered medications for this visit. Past Surgical History:   Procedure Laterality Date    HX HEENT      tonsillectomy    HX LITHOTRIPSY  10/9/2010    Left upper 3rd ureteral calculus    HX ORTHOPAEDIC      Left shoulder       Allergies and Sensitivities:  Allergies   Allergen Reactions    Pcn [Penicillins] Not Reported This Time    Sulfa (Sulfonamide Antibiotics) Other (comments)       Family History:  No family history on file. Social History:  Social History     Tobacco Use    Smoking status: Never Smoker    Smokeless tobacco: Never Used   Substance Use Topics    Alcohol use: Yes     Alcohol/week: 2.0 standard drinks     Types: 2 Cans of beer per week     Comment: occasionally     Drug use: No     He  reports that he has never smoked. He has never used smokeless tobacco.  He  reports current alcohol use of about 2.0 standard drinks of alcohol per week. Physical Exam:  BP Readings from Last 3 Encounters:   03/28/22 (!) 160/70   02/24/22 132/89   09/27/21 (!) 148/100         Pulse Readings from Last 3 Encounters:   03/28/22 70   02/24/22 80   09/27/21 68          Wt Readings from Last 3 Encounters:   03/28/22 125.6 kg (277 lb)   02/24/22 125.9 kg (277 lb 9.6 oz)   09/27/21 121.6 kg (268 lb)       Constitutional: Oriented to person, place, and time. HENT: Head: Normocephalic and atraumatic  Neck: No JVD present. Carotid bruit is not appreciated. Cardiovascular: Regular rhythm.    No murmur, gallop or rubs appreciated  Lung: Breath sounds normal. No respiratory distress. No ronchi or rales appreciated  Abdominal: No tenderness. No rebound and no guarding. Musculoskeletal: There is trace + lower extremity edema. No cynosis    LABS:   Lab Results   Component Value Date/Time    Sodium 143 03/21/2022 01:12 AM    Potassium 3.8 03/21/2022 01:12 AM    Chloride 104 03/21/2022 01:12 AM    CO2 31 03/21/2022 01:12 AM    Glucose 103 (H) 03/21/2022 01:12 AM    BUN 13 03/21/2022 01:12 AM    Creatinine 1.0 03/21/2022 01:12 AM     Lipids Latest Ref Rng & Units 8/24/2021 1/10/2020 12/12/2018   Chol, Total 110 - 200 mg/dL 142 224(H) 213(H)   HDL >=40 mg/dL 41 38(L) 39(L)   LDL 50 - 99 mg/dL 82 156(H) 141(H)   Trig 40 - 149 mg/dL 96 148 167(H)   Some recent data might be hidden     Lab Results   Component Value Date/Time    ALT (SGPT) 49 (H) 03/21/2022 01:12 AM     Lab Results   Component Value Date/Time    Hemoglobin A1c 6.1 (H) 02/16/2022 10:54 AM     Lab Results   Component Value Date/Time    TSH 0.47 12/12/2018 08:23 AM       EKG  Sinus rhythm at 66 bpm.  No pathologic Q waves. No ST changes of ischemia. ECHO (09/20)  Left Ventricle  Normal cavity size and systolic function (ejection fraction normal). Moderately increased wall thickness. Wall motion: normal. The estimated EF is 55 - 60%. Visually measured ejection fraction. There is mild (grade 1) left ventricular diastolic dysfunction. Wall Scoring  The left ventricular wall motion is normal.             Left Atrium  Normal cavity size. Left Atrium volume index is 29 mL/m2. Right Ventricle  Normal global systolic function. Mildly dilated right ventricle. Right Atrium  Mildly dilated right atrium. Aortic Valve  Normal valve structure, trileaflet valve structure, no stenosis and no regurgitation. Mitral Valve  Normal valve structure and no stenosis. Trace regurgitation. Tricuspid Valve  Normal valve structure and no stenosis. Trace regurgitation.     Pulmonic Valve  Pulmonic valve normal doppler findings. Normal valve structure and no stenosis. Mild regurgitation. Aorta  Mildly dilated aortic root; diameter is 3.6 cm. Pulmonary Artery  Pulmonary arterial systolic pressure (PASP) is 29 mmHg. Pulmonary hypertension not suggested by Doppler findings. STRESS TEST (010/20)  · Baseline ECG: Normal sinus rhythm. · Inconclusive stress test.  · Moderate risk Melendez treadmill score. And exercise for 7 minutes with heart rate achieved 120 bpm when patient started having 7 out of 10 chest pain with EKG changes. Post exercise nuclear images was not performed as he was not able to achieve target heart rate    CATHETERIZATION (11/20)  Left Main    Separate LAD and LCx ostia    Left Anterior Descending    Proximal LAD ectasia. Mild proximal disease. Mid LAD after D2 has eccentric borderline 60% narrowing. Otherwise LAD is normal IFR of mid LAD lesion was 0.93, suggesting physiologically not significant lesion D1: Ostial 60%, small caliber vessel D2: Ostial 85% stenosis, small-medium caliber vessel however supplying very small myocardial wall    Left Circumflex    Mid LCx borderline 50-60% tubular narrowing. IFR of mid LCx lesion was 0.97 suggesting physiologically not significant stenosis OM1: Mild proximal luminal irregularities otherwise normal    Right Coronary Artery    Large ectatic vessel No obstructive disease noted. Mild diffuse luminal irregularities      IMPRESSION & PLAN:  Mr. Bradly Browne is 45 y.o. male with multiple medical problem    CAD:  Patient had a cardiac catheterization in November 2020 which showed two-vessel CAD, IFR was not physiologically significant  Since then patient has remained on medical management with almost resolution of his anginal symptoms  No use of nitroglycerin since last time. Continue with beta-blocker and Imdur  Continue with aspirin and statin    Hypertension:  /70.   Usually blood pressure at home 055450 systolic and 0899 diastolic. His chlorthalidone has been discontinued because of significant hypokalemia  Currently on Toprol, spironolactone, Imdur. I will discontinue Toprol and use nonselective Coreg 6.25 mg twice daily and uptitrate as much as possible. We will check blood pressure again in 3 weeks and however clinic  Salt restriction advised    Hyperlipidemia:  Based on his cholesterol profile in January 2020, his 10-year risk of having ASCVD is 7.8%. Added atorvastatin 40 mg daily in September 2020. LDL in 08/2021 was 82. Continue current medication. Repeat fasting lipid profile before next visit    This plan was discussed with patient who is in agreement. Thank you for allowing me to participate in patient care. Please feel free to call me if you have any question or concern. Yana Sawyer MD  Please note: This document has been produced using voice recognition software. Unrecognized errors in transcription may be present. Statement Selected

## 2022-04-18 NOTE — PROGRESS NOTES
Marizol Trujillo presents today for a 3 week follow-up of his blood pressure after his Toprol XL was discontinued and he was switched to carvedilol 6.25mg BID for suboptimally controlled blood pressure. This medication change was made when he saw Dr. Rajendra Mast on 3/28/22. He is a 45year old male with history of CAD, hypertension, hyperlipidemia, obesity, sleep apnea. He had an exercise stress done on 10/22/21 which showed a Moderate risk Duke treadmill score. He had an echo also done on 10/22/21 and it showed an EF of 55-60%, normal wall motion, grade 1 diastolic dysfunction, and PASP of 29 mmHg. He underwent a cardiac catheterization in November 10, 2020 which showed:     Left Main   Separate LAD and LCx ostia   Left Anterior Descending   Proximal LAD ectasia. Mild proximal disease. Mid LAD after D2 has eccentric borderline 60% narrowing. Otherwise LAD is normal IFR of mid LAD lesion was 0.93, suggesting physiologically not significant lesion D1: Ostial 60%, small caliber vessel D2: Ostial 85% stenosis, small-medium caliber vessel however supplying very small myocardial wall   Left Circumflex   Mid LCx borderline 50-60% tubular narrowing. IFR of mid LCx lesion was 0.97 suggesting physiologically not significant stenosis OM1: Mild proximal luminal irregularities otherwise normal   Right Coronary Artery   Large ectatic vessel No obstructive disease noted. Mild diffuse luminal irregularities     His CAD is being medically managed. Denies chest pain, tightness, heaviness, and palpitations. Denies shortness of breath at rest, dyspnea on exertion, orthopnea and PND. Denies abdominal bloating. Denies lightheadedness, dizziness, and syncope. Denies lower extremity edema and claudication. Denies nausea, vomiting, diarrhea, melena, hematochezia. Denies hematuria, urgency, frequency. Denies fever, chills.         PMH:  Past Medical History:   Diagnosis Date    Alcohol consumption of one to four drinks per day 8/31/2021    Anxiety 8/31/2021    Asthma     Bilateral lower extremity edema 8/31/2021    Calculus of kidney     Coronary artery disease involving native coronary artery of native heart without angina pectoris 8/9/2021    Elevated LFTs 2/24/2022    HLD (hyperlipidemia)     Hypertension     Hypokalemia 9/17/2014    Obesity (BMI 30-39.9) 2/5/2018    Paresthesias/numbness 6/13/2018    Prediabetes 2/24/2022    Primary hypertension 6/13/2018    S/P cardiac cath 11/2020    Borderline mid LAD and mid LCx ( iFR of both lesion, not significant)     Sleep apnea     not using CPAP    Venous insufficiency of both lower extremities 1/15/2018       PSH:  Past Surgical History:   Procedure Laterality Date    HX HEENT      tonsillectomy    HX LITHOTRIPSY  10/9/2010    Left upper 3rd ureteral calculus    HX ORTHOPAEDIC      Left shoulder       MEDS:  Current Outpatient Medications   Medication Sig    carvediloL (COREG) 6.25 mg tablet Take 1 Tablet by mouth two (2) times daily (with meals).  LORazepam (ATIVAN) 0.5 mg tablet Take 1 Tablet by mouth nightly as needed for Anxiety. Max Daily Amount: 0.5 mg.    spironolactone (ALDACTONE) 25 mg tablet Take 1 Tablet by mouth daily.  atorvastatin (LIPITOR) 40 mg tablet Take 1 Tablet by mouth daily.  isosorbide mononitrate ER (IMDUR) 30 mg tablet TAKE 1 TABLET BY MOUTH EVERY DAY IN THE MORNING    aspirin delayed-release 81 mg tablet TAKE 1 TABLET BY MOUTH EVERY DAY    nitroglycerin (NITROSTAT) 0.4 mg SL tablet 1 Tab by SubLINGual route every five (5) minutes as needed for Chest Pain. Up to 3 doses. No current facility-administered medications for this visit. Allergies and Sensitivities:  Allergies   Allergen Reactions    Pcn [Penicillins] Not Reported This Time    Sulfa (Sulfonamide Antibiotics) Other (comments)       Family History:  No family history on file. Social History:  He  reports that he has never smoked.  He has never used smokeless tobacco.  He  reports current alcohol use of about 2.0 standard drinks of alcohol per week. Physical:  Visit Vitals  BP (!) 144/100   Pulse 71   Ht 5' 10\" (1.778 m)   Wt 126.1 kg (278 lb)   SpO2 96%   BMI 39.89 kg/m²       Exam:  Neck:  Supple, no JVD, no carotid bruits  CV:  Normal S1 and  S2, no murmurs, rubs, or gallops noted  Lungs:  Clear to ausculation throughout, no wheezes or rales  Abd:  Soft, non-tender, obese with good bowel sounds. No hepatosplenomegaly  Extremities:  No edema      Data:  EKG:  Not done today      LABS:  Lab Results   Component Value Date/Time    Sodium 143 03/21/2022 01:12 AM    Potassium 3.8 03/21/2022 01:12 AM    Chloride 104 03/21/2022 01:12 AM    CO2 31 03/21/2022 01:12 AM    Glucose 103 (H) 03/21/2022 01:12 AM    BUN 13 03/21/2022 01:12 AM    Creatinine 1.0 03/21/2022 01:12 AM     Lab Results   Component Value Date/Time    Cholesterol, total 142 08/24/2021 07:55 AM    HDL Cholesterol 41 08/24/2021 07:55 AM    LDL, calculated 82 08/24/2021 07:55 AM    Triglyceride 96 08/24/2021 07:55 AM     Lab Results   Component Value Date/Time    ALT (SGPT) 49 (H) 03/21/2022 01:12 AM         Impression/Plan:  1.  CAD, 2 vessel disease, being medically managed  2. Hypertension, blood pressure elevated and suboptimally controlled  3. Hyperlipidemia, on atorvastatin 20mg  4. Sleep apnea  5. Obesity    Mr. Mica Carrillo was seen today for blood pressure follow-up and management. When seen by Dr. Eitan Bond a few weeks ago, he was switched to carvedilol 6.25mg BID to improve blood pressure control. He has tolerated the change in his medication and offers no cardiac complaints. His blood pressure was initially 152/110 and when I rechecked it, it was 144/100. Will increase his carvedilol to 12.5mg twice a day and all other medications to remain the same. He will return for blood pressure re-evaluation in one month.   He was advised to maintain a low sodium diet, 1500mg per day, which he states he is doing. He will follow-up with Dr. Mickie Garner as scheduled and as needed. Sanjuana Matos MSN, FNP-BC    Please note:  Portions of this chart were created with Dragon medical speech to text program.  Unrecognized errors may be present.

## 2022-04-25 ENCOUNTER — OFFICE VISIT (OUTPATIENT)
Dept: CARDIOLOGY CLINIC | Age: 52
End: 2022-04-25
Payer: COMMERCIAL

## 2022-04-25 VITALS
OXYGEN SATURATION: 96 % | WEIGHT: 278 LBS | HEART RATE: 71 BPM | SYSTOLIC BLOOD PRESSURE: 144 MMHG | HEIGHT: 70 IN | DIASTOLIC BLOOD PRESSURE: 100 MMHG | BODY MASS INDEX: 39.8 KG/M2

## 2022-04-25 DIAGNOSIS — I25.10 CORONARY ARTERY DISEASE INVOLVING NATIVE CORONARY ARTERY OF NATIVE HEART WITHOUT ANGINA PECTORIS: ICD-10-CM

## 2022-04-25 DIAGNOSIS — E78.2 MIXED HYPERLIPIDEMIA: ICD-10-CM

## 2022-04-25 DIAGNOSIS — I10 ESSENTIAL HYPERTENSION WITH GOAL BLOOD PRESSURE LESS THAN 140/90: Primary | ICD-10-CM

## 2022-04-25 PROCEDURE — 99214 OFFICE O/P EST MOD 30 MIN: CPT | Performed by: NURSE PRACTITIONER

## 2022-04-25 RX ORDER — CARVEDILOL 12.5 MG/1
12.5 TABLET ORAL 2 TIMES DAILY WITH MEALS
Qty: 60 TABLET | Refills: 4 | Status: SHIPPED | OUTPATIENT
Start: 2022-04-25 | End: 2022-09-19

## 2022-04-25 NOTE — PATIENT INSTRUCTIONS
Increase carvedilol to 12.5mg twice a day. Take 2 of your 6.25mg tablets twice a day until you run out and then begin using the 12.5mg tablets and take 1 tablet twice a day.   All other medications to remain the same  Follow-up with Velasquez Mcnamara in one month   Follow-up with Dr. Ortiz Sessions as scheduled and as needed  Low sodium diet, 1500mg per day

## 2022-04-25 NOTE — PROGRESS NOTES
Chika Adams presents today for   Chief Complaint   Patient presents with    Follow-up     4 week BP check- no complaints        Chika Adams preferred language for health care discussion is english/other. Is someone accompanying this pt? no    Is the patient using any DME equipment during [address]? no    Depression Screening:  3 most recent PHQ Screens 4/25/2022   Little interest or pleasure in doing things Not at all   Feeling down, depressed, irritable, or hopeless Not at all   Total Score PHQ 2 0       Learning Assessment:  Learning Assessment 1/16/2020   PRIMARY LEARNER Patient   HIGHEST LEVEL OF EDUCATION - PRIMARY LEARNER  > 4 YEARS OF COLLEGE   BARRIERS PRIMARY LEARNER NONE   CO-LEARNER CAREGIVER No   PRIMARY LANGUAGE ENGLISH   LEARNER PREFERENCE PRIMARY DEMONSTRATION   ANSWERED BY patient   RELATIONSHIP SELF       Abuse Screening:  Abuse Screening Questionnaire 4/25/2022   Do you ever feel afraid of your partner? N   Are you in a relationship with someone who physically or mentally threatens you? N   Is it safe for you to go home? Y       Fall Risk  Fall Risk Assessment, last 12 mths 10/2/2020   Able to walk? Yes   Fall in past 12 months? No       Pt currently taking Anticoagulant therapy? no    Coordination of Care:  1. Have you been to the ER, urgent care clinic since your last visit? Hospitalized since your last visit? no    2. Have you seen or consulted any other health care providers outside of the [address] [place] [address] since your last visit? Include any pap smears or colon screening.  no

## 2022-05-18 DIAGNOSIS — E87.6 HYPOKALEMIA: ICD-10-CM

## 2022-05-18 DIAGNOSIS — I10 ESSENTIAL HYPERTENSION: ICD-10-CM

## 2022-05-18 RX ORDER — SPIRONOLACTONE 25 MG/1
TABLET ORAL
Qty: 90 TABLET | Refills: 0 | Status: SHIPPED | OUTPATIENT
Start: 2022-05-18 | End: 2022-09-07 | Stop reason: SDUPTHER

## 2022-05-18 NOTE — TELEPHONE ENCOUNTER
Requested Prescriptions     Signed Prescriptions Disp Refills    spironolactone (ALDACTONE) 25 mg tablet 90 Tablet 0     Sig: TAKE 1 TABLET BY MOUTH EVERY DAY     Authorizing Provider: Zainab Ahumada

## 2022-05-25 ENCOUNTER — OFFICE VISIT (OUTPATIENT)
Dept: ORTHOPEDIC SURGERY | Age: 52
End: 2022-05-25
Payer: COMMERCIAL

## 2022-05-25 ENCOUNTER — HOSPITAL ENCOUNTER (OUTPATIENT)
Dept: OCCUPATIONAL MEDICINE | Age: 52
Discharge: HOME | End: 2022-05-25
Attending: FAMILY MEDICINE

## 2022-05-25 VITALS
BODY MASS INDEX: 40.37 KG/M2 | OXYGEN SATURATION: 96 % | HEART RATE: 74 BPM | HEIGHT: 70 IN | RESPIRATION RATE: 14 BRPM | WEIGHT: 282 LBS

## 2022-05-25 DIAGNOSIS — M51.36 DEGENERATIVE DISC DISEASE, LUMBAR: ICD-10-CM

## 2022-05-25 DIAGNOSIS — M99.03 LUMBAR REGION SOMATIC DYSFUNCTION: ICD-10-CM

## 2022-05-25 DIAGNOSIS — M99.09 SOMATIC DYSFUNCTION OF ABDOMINAL REGION: ICD-10-CM

## 2022-05-25 DIAGNOSIS — M99.07 UPPER EXTREMITY SOMATIC DYSFUNCTION: ICD-10-CM

## 2022-05-25 DIAGNOSIS — M51.36 DEGENERATIVE DISC DISEASE, LUMBAR: Primary | ICD-10-CM

## 2022-05-25 DIAGNOSIS — G57.01 PIRIFORMIS SYNDROME, RIGHT: ICD-10-CM

## 2022-05-25 DIAGNOSIS — M99.08 RIB CAGE REGION SOMATIC DYSFUNCTION: ICD-10-CM

## 2022-05-25 DIAGNOSIS — M99.06 LOWER LIMB REGION SOMATIC DYSFUNCTION: ICD-10-CM

## 2022-05-25 DIAGNOSIS — M99.04 SACRAL REGION SOMATIC DYSFUNCTION: ICD-10-CM

## 2022-05-25 DIAGNOSIS — M99.02 THORACIC REGION SOMATIC DYSFUNCTION: ICD-10-CM

## 2022-05-25 DIAGNOSIS — M99.05 PELVIC SOMATIC DYSFUNCTION: ICD-10-CM

## 2022-05-25 DIAGNOSIS — M99.01 CERVICAL SOMATIC DYSFUNCTION: ICD-10-CM

## 2022-05-25 PROCEDURE — 98929 OSTEOPATH MANJ 9-10 REGIONS: CPT | Performed by: FAMILY MEDICINE

## 2022-05-25 PROCEDURE — 99204 OFFICE O/P NEW MOD 45 MIN: CPT | Performed by: FAMILY MEDICINE

## 2022-05-25 RX ORDER — DICLOFENAC SODIUM 50 MG/1
50 TABLET, DELAYED RELEASE ORAL 2 TIMES DAILY WITH MEALS
Qty: 60 TABLET | Refills: 1 | Status: SHIPPED | OUTPATIENT
Start: 2022-05-25 | End: 2022-07-25 | Stop reason: ALTCHOICE

## 2022-05-25 NOTE — PROGRESS NOTES
HISTORY OF PRESENT ILLNESS    Shelby Pickard 1970 is a 45y.o. year old male comes in today as new patient for: back pain down right leg - chronic    Patients symptoms have been present for 1+ years off and on but bad the last 6 months. Pain level 10 - Worst pain ever/10. It has worsened with run/walk/lift, bending helps some. Patient has tried:  Stretch, ibuprofen 600-800mg with benefit. It is described as pain in low back worsening but no known injury. IMAGING: XR lumbar pending    Past Surgical History:   Procedure Laterality Date    HX HEENT      tonsillectomy    HX LITHOTRIPSY  10/9/2010    Left upper 3rd ureteral calculus    HX ORTHOPAEDIC      Left shoulder     Social History     Socioeconomic History    Marital status:    Tobacco Use    Smoking status: Never Smoker    Smokeless tobacco: Never Used   Vaping Use    Vaping Use: Never used   Substance and Sexual Activity    Alcohol use: Yes     Alcohol/week: 2.0 standard drinks     Types: 2 Cans of beer per week     Comment: occasionally     Drug use: No      Current Outpatient Medications   Medication Sig Dispense Refill    spironolactone (ALDACTONE) 25 mg tablet TAKE 1 TABLET BY MOUTH EVERY DAY 90 Tablet 0    carvediloL (COREG) 12.5 mg tablet Take 1 Tablet by mouth two (2) times daily (with meals). 60 Tablet 4    atorvastatin (LIPITOR) 40 mg tablet Take 1 Tablet by mouth daily. 30 Tablet 6    isosorbide mononitrate ER (IMDUR) 30 mg tablet TAKE 1 TABLET BY MOUTH EVERY DAY IN THE MORNING 30 Tablet 6    aspirin delayed-release 81 mg tablet TAKE 1 TABLET BY MOUTH EVERY DAY 30 Tablet 6    LORazepam (ATIVAN) 0.5 mg tablet Take 1 Tablet by mouth nightly as needed for Anxiety. Max Daily Amount: 0.5 mg. (Patient not taking: Reported on 5/25/2022) 30 Tablet 1    nitroglycerin (NITROSTAT) 0.4 mg SL tablet 1 Tab by SubLINGual route every five (5) minutes as needed for Chest Pain. Up to 3 doses.  (Patient not taking: Reported on 5/25/2022) 25 Tab 3     Past Medical History:   Diagnosis Date    Alcohol consumption of one to four drinks per day 8/31/2021    Anxiety 8/31/2021    Asthma     Bilateral lower extremity edema 8/31/2021    Calculus of kidney     Coronary artery disease involving native coronary artery of native heart without angina pectoris 8/9/2021    Elevated LFTs 2/24/2022    HLD (hyperlipidemia)     Hypertension     Hypokalemia 9/17/2014    Obesity (BMI 30-39.9) 2/5/2018    Paresthesias/numbness 6/13/2018    Prediabetes 2/24/2022    Primary hypertension 6/13/2018    S/P cardiac cath 11/2020    Borderline mid LAD and mid LCx ( iFR of both lesion, not significant)     Sleep apnea     not using CPAP    Venous insufficiency of both lower extremities 1/15/2018     History reviewed. No pertinent family history. ROS:  Radiates down right leg/buttock w/ tingle and numbness great toe. No incont, fever    Objective:  Pulse 74   Resp 14   Ht 5' 10\" (1.778 m)   Wt 282 lb (127.9 kg)   SpO2 96%   BMI 40.46 kg/m²   NEURO:  Sensation intact to light touch. Reflexes +2/4 patellar and Achilles bilaterally. M/S:  Examined seated and supine. Slump negative. Standing flexion test positive left  Sphinx test positive left. ASIS high left  Iliac crests high left Pubes equal bilaterally Medial malleolus high left  Sacral base posterior left  KEN low right  TTA at C7 on left worse flexion, T2, 3, 4 on left worse flexion and L2, 4, 5 on left worse flexion Rib(s) 2, 3 TTP and posterior left LE Strength +5/5 bilaterally Piriformis tight and TTP right Thoracic diaphragm restricted left. Scapula motion restricted w/ TTA right. Hip flexion limited left. Assessment/Plan:     ICD-10-CM ICD-9-CM    1. Degenerative disc disease, lumbar  M51.36 722. 52 XR SPINE LUMB 2 OR 3 V      REFERRAL TO PHYSICAL THERAPY      diclofenac EC (VOLTAREN) 50 mg EC tablet   2.  Piriformis syndrome, right  G57.01 355.0 REFERRAL TO PHYSICAL THERAPY diclofenac EC (VOLTAREN) 50 mg EC tablet   3. Lumbar region somatic dysfunction  M99.03 739.3 [place] OSTEOPATHIC MANIP,9-10 BODY REGN   4. Pelvic somatic dysfunction  M99.05 739.5 [place] OSTEOPATHIC MANIP,9-10 BODY REGN   5. Sacral region somatic dysfunction  M99.04 739.4 [place] OSTEOPATHIC MANIP,9-10 BODY REGN   6. Thoracic region somatic dysfunction  M99.02 739.2 [place] OSTEOPATHIC MANIP,9-10 BODY REGN   7. Cervical somatic dysfunction  M99.01 739.1 [place] OSTEOPATHIC MANIP,9-10 BODY REGN   8. Upper extremity somatic dysfunction  M99.07 739.7 [place] OSTEOPATHIC MANIP,9-10 BODY REGN   9. Rib cage region somatic dysfunction  M99.08 739.8 [place] OSTEOPATHIC MANIP,9-10 BODY REGN   10. Lower limb region somatic dysfunction  M99.06 739.6 [place] OSTEOPATHIC MANIP,9-10 BODY REGN   11. Somatic dysfunction of abdominal region  M99.09 739.9 [address]       Patient (or guardian if minor) verbalizes understanding of evaluation and plan. Verbal consent obtained. Cervical, Thoracic, Rib, Lumbar, Pelvic, Sacral, Upper Ext, Lower Ext and Abdominal  SD treated with myofascial and ME. Correction of previous malalignments verified after Tx. Pt tolerated well. Notes improvement of Sx and pain is now rated 1/10. HEP/stretches daily. Discussed stretching/strengthening/posture. Will start HEP, PT and Rx for voltaren 50nmg as above and plan follow-up 6 weeks. Total time spent on encounter including chart/imaging/lab review and evaluation/documentation/demo home program/coordination of care/form completion but not including time for any procedures/manipulation 47 minutes.

## 2022-06-21 ENCOUNTER — HOSPITAL ENCOUNTER (OUTPATIENT)
Dept: PHYSICAL THERAPY | Age: 52
Discharge: HOME | End: 2022-06-21
Attending: FAMILY MEDICINE
Payer: COMMERCIAL

## 2022-06-21 PROCEDURE — 97162 PT EVAL MOD COMPLEX 30 MIN: CPT

## 2022-06-21 NOTE — PROGRESS NOTES
PT DAILY TREATMENT LOWER QUARTER [id number]    Patient Name: Kathy Gunderson  Date:2022  : 1970  [x]  Patient  Verified  Payor: Lamont Gaucher / Plan: [place] [employer] / Product Type: PPO /    In time:3:11P  Out time:3:47P  Total Treatment Time (min): 36  Visit #: 1 of 10    Treatment Area: Other low back pain [M54.59]    SUBJECTIVE  Pain Level (0-10 scale): 4  []constant []intermittent []improving [x]worsening []no change since onset    Any medication changes, allergies to medications, adverse drug reactions, diagnosis change, or new procedure performed?: [x] No    [] Yes (see summary sheet for update)  Subjective functional status/changes:     PLOF: Ind/pain free performing ADL/IADLs, self care tasks, recreational participation, vocational responsibilities and ambulation/standing prolonged durations  Limitations to PLOF: Increased Pain, Decreased Activity Tolerance, Radicular ss/sx   Mechanism of Injury: Patient presents to clinic secondary to insidious onset of low back pain. Reports pain began around 1 yr ago w/ symptoms worsening in past 6 months to occur daily, in addition to presence of radicular ss/sx to right LE. X-Ray of l/s reveals 'moderate degenerative changes in the anterior as well as posterior elements throughout lumbar spine'. Current symptoms: Describes pain as \"excruciating, constant and sharp\" (w/ symptom flare up) located in right lower back. States symptoms have occurred in left lower back, however not nearly as intense and/or w/o presence of left LE radicular symptoms. Reports radicular symptoms to right LE are intermittent and described as shooting pain, numbness and tingling that sometimes radiates to right posterior hip, knee and foot. Aggravating factors include standing/ambulation prolonged periods (>5 min), movement (including bending/lifting). Easing factors include sitting and stetching.  Denies/Confirms radicular ss/sx, numbness/tingling  PMHx/Surgical Hx: Pt reports: High Blood Pressure  Work Hx: Full Time - [employer]:  (limited in bending, lifting and walking while at work)  Pt Goals: reduce pain and increase ability to do physical activities      OBJECTIVE/EXAMINATION    36 min [x]Eval                  []Re-Eval; Pt ed on relavant anatomy/patho, pain science and prescribed HEP                                          With   [x] TE   [x] TA   [] neuro   [] other: Patient Education: [x] Review HEP    [] Progressed/Changed HEP based on:   [] positioning   [] body mechanics   [] transfers   [] heat/ice application    [] other:        Posture: Increased Lumbar Lordosis  Increased weight shift to left LE    Functional Sit to Stand:  Increased left LE/left trunk weightshift    Balance/Stability:  SLS: NT secondary to TC - to be assessed in upcoming sessions    AROM:  Lumbar (% of WNL) -   Flex: 75  Ext: 75  Right SB: 50  Left SB: 25, tightness  Right Rotation: 25, p!   Left Rotation: 75   Repeated Movements: (-) for symptom production/reduction w/ repeated ext    MMT (1-5):   Right Hip Flex: 4  Right Hip IR/ER:4  Right Knee Ext:4  Right Knee Flex: 4  Right Hip Abd/Ext:NT secondary to TC - to be assessed in upcoming session  Left Hip Flex:4+  Left Hip IR/ER:4+  Left Knee Ext:5  Left Knee Flex: 5  Left Hip Abd/Ext:NT secondary to TC - to be assessed in upcoming session  Core: Inability to maintain PPT w/ DL and/or SL lowering while supine on plinth    Joint Mobility:   NT secondary to TC - to be assessed in upcoming sessions as appropriate     Neural Tension:  Slump: (+) Right LE      Flexibility: [] Unable to assess at this time  Hamstrings:    (L) Tightness= [] WNL   [] Min   [x] Mod   [] Severe    (R) Tightness= [] WNL   [] Min   [] Mod   [x] Severe  Piriformis:    (L) Tightness= [] WNL   [] Min   [x] Mod   [] Severe    (R) Tightness= [] WNL   [] Min   [] Mod   [x] Severe  Gastroc:    (L) Tightness= [] WNL   [] Min   [x] Mod   [] Severe    (R) Tightness= [] WNL   [] Min   [x] Mod   [] Severe    90/90 HS: significantly limited (right>left):                                  Palpation  [] Min  [x] Mod  [] Severe    Location: B umbar paraspinals,QL and glute med    Special Tests:   SLR: (+) Right LE  FABIR/FADIR: (-) for pain, however significantly limited mobility of right hip IR/ER  LE Long Axis Distraction: NT - to be assessed in upcoming sessions as appropriate     Other:  Pelvic Symmetry: NT - to be assessed in upcoming sessions as appropriate        Pain Level (0-10 scale) post treatment: 4    ASSESSMENT/Changes in Function: See POC    Patient will continue to benefit from skilled PT services to modify and progress therapeutic interventions, address functional mobility deficits, address ROM deficits, address strength deficits, analyze and address soft tissue restrictions, analyze and cue movement patterns, analyze and modify body mechanics/ergonomics, assess and modify postural abnormalities and instruct in home and community integration to attain remaining goals.      [x]  See Plan of Care  []  See progress note/recertification  []  See Discharge Summary         Progress towards goals / Updated goals:  See POC     PLAN  [x]  Upgrade activities as tolerated     [x]  Continue plan of care  [x]  Update interventions per flow sheet       []  Discharge due to:_  []  Other:_      Meryle Napoleon, DPT 6/21/2022  3:04 PM

## 2022-06-21 NOTE — PROGRESS NOTES
[facility]  [address] [facility], [address] [place] [address], [address] [place], [place] [address]y [address],[place] [address] [phone] fax      Plan of Care/ Statement of Necessity for Physical Therapy Services    Patient name: Lakeshia Altman Start of Care: 2022   Referral source: Adilene Montero DO : 1970    Medical Diagnosis: Other low back pain [M54.59]  Payor: Heather Ramirez / Plan: [place] [employer] [place] / Product Type: PPO /  Onset Date: 1 yr ago, worsened within past 6 months; MD Order Date: 22    Treatment Diagnosis: Low Back Pain    Prior Hospitalization: see medical history Provider#: [id number]   Medications: Verified on Patient summary List    Comorbidities: Pt reports: High Blood Pressure    Prior Level of Function: Ind/pain free performing ADL/IADLs, self care tasks, recreational participation, vocational responsibilities and ambulation/standing prolonged durations      The Plan of Care and following information is based on the information from the initial evaluation. Assessment/ key information:   Patient presents to clinic secondary to insidious onset of low back pain. Reports pain began around a yr ago w/ symptoms worsening in past 6 months to become daily, in addition to presence of radicular ss/sx to right LE. X-Ray of l/s reveals 'moderate degenerative changes in the anterior as well as posterior elements throughout lumbar spine'. Today's clinical examination demonstrates AROM/flexibilitiy limitations, soft tissue restrictions/TTP through B umbar paraspinals,QL and glute med, decreased function (evident by FOTO score), decreased B LE strength and core stability, (+) SLR test and overall mobility limitations/compensatory movement/gait patterns. These limitations affect the patient's ability to perform ADLs/IADLs, transfers, ambulate/sit/stand for prolonged durations and vocational responsibilities efficiently and pain free.  The patient would benefit from skilled physical therapy interventions to address the aforementioned impairments in order to return to his PLOF of completing all functional activities pain free and independently. Evaluation Complexity History MEDIUM  Complexity : 1-2 comorbidities / personal factors will impact the outcome/ POC ; Examination MEDIUM Complexity : 3 Standardized tests and measures addressing body structure, function, activity limitation and / or participation in recreation  ;Presentation MEDIUM Complexity : Evolving with changing characteristics  ; Clinical Decision Making MEDIUM Complexity : FOTO score of 26-74  Overall Complexity Rating: MEDIUM  Problem List: pain affecting function, decrease ROM, decrease strength, impaired gait/ balance, decrease ADL/ functional abilitiies, decrease activity tolerance, decrease flexibility/ joint mobility and decrease transfer abilities   Treatment Plan may include any combination of the following: Therapeutic exercise, Therapeutic activities, Neuromuscular re-education, Physical agent/modality, Gait/balance training, Manual therapy, Patient education, Self Care training, Functional mobility training, Home safety training and Stair training  Patient / Family readiness to learn indicated by: asking questions, trying to perform skills and interest  Persons(s) to be included in education: patient (P)  Barriers to Learning/Limitations: None  Patient Goal (s): reduce pain and increase ability to do physical activities  Patient Self Reported Health Status: good  Rehabilitation Potential: good    Short Term Goals: To be accomplished in 2 treatments:     1. Patient will become proficient in their HEP and will be compliant in performing that program.     Evaluation: HEP established        Long Term Goals: To be accomplished in 5 weeks:    1. Patient's pain level will be 0-2/10 with activity in order to improve patient's ability to perform normal ADLs.     Evaluation: Pain 3-8/10       2.  Patient will increase FOTO score >/= 66 points to indicate increased functional mobility.     Evaluation: 50 points        3. Patient will report ability to stand/ambulate >/= 60 min in order to increase ease with recreational partocipation and demonstrate a return to overall PLOF  Evaluation: standing/ambulation tolerance reported: <5 min. 4. Patient will demonstrate right hip abd MMT >/= 4+/5 to perform ADL/IADLs with greater ease   Evaluation: NT secondary to TC - to be assessed at next scheduled f/u      Frequency / Duration: Patient to be seen 2 times per week for 5 weeks. Patient/ Caregiver education and instruction: Diagnosis, prognosis, self care, activity modification and exercises   [x]  Plan of care has been reviewed with TEVIN Estrada DPT 6/21/2022 9:10 AM    ________________________________________________________________________    I certify that the above Therapy Services are being furnished while the patient is under my care. I agree with the treatment plan and certify that this therapy is necessary.     [de-identified] Signature:____________Date:_________TIME:________     Hailey Reynoso DO  ** Signature, Date and Time must be completed for valid certification **    Please sign and return to In [address] [place] [address], [address] [place] [address], [address] [place] [address],[facility] 300  [phone] fax

## 2022-06-24 ENCOUNTER — HOSPITAL ENCOUNTER (OUTPATIENT)
Dept: PHYSICAL THERAPY | Age: 52
Discharge: HOME | End: 2022-06-24
Attending: FAMILY MEDICINE
Payer: COMMERCIAL

## 2022-06-24 PROCEDURE — 97110 THERAPEUTIC EXERCISES: CPT

## 2022-06-24 PROCEDURE — 97140 MANUAL THERAPY 1/> REGIONS: CPT

## 2022-06-24 PROCEDURE — 97112 NEUROMUSCULAR REEDUCATION: CPT

## 2022-06-24 NOTE — PROGRESS NOTES
PT DAILY TREATMENT NOTE     Patient Name: Ko Moon  Date:2022  : 1970  [x]  Patient  Verified  Payor: Malcom Aquino / Plan: [place] [employer] PPO / Product Type: PPO /    In time: 3:05   Out time: 3:45  Total Treatment Time (min): 40  Visit #: 2 of 10    Treatment Area: Other low back pain [M54.59]    SUBJECTIVE  Pain Level (0-10 scale): 3  Any medication changes, allergies to medications, adverse drug reactions, diagnosis change, or new procedure performed?: [x] No    [] Yes (see summary sheet for update)  Subjective functional status/changes:   [] No changes reported  Pt reports compliance with HEP. OBJECTIVE    17 min Therapeutic Exercise:  [x] See flow sheet :   Rationale: increase ROM and increase strength to improve the patients ability to tolerate ADLs    8 min Neuromuscular Re-education:  [x]  See flow sheet :   Rationale: increase strength, improve coordination and increase proprioception  to improve the patients ability to tolerate daily tasks. 15 min Manual Therapy: In supine: pelvic alignment and leg length assessments, MET to correct right posterior/left anterior innominate, TPR left psoas; in B s/l: MET to correct sacral torsion. The manual therapy interventions were performed at a separate and distinct time from the therapeutic activities interventions. Rationale: decrease pain, increase ROM, increase tissue extensibility and increase postural awareness to tolerate functional tasks. With   [x] TE   [] TA   [x] neuro   [] other: Patient Education: [x] Review HEP    [] Progressed/Changed HEP based on:   [x] positioning   [x] body mechanics   [] transfers   [] heat/ice application    [] other:      Other Objective/Functional Measures: Initiated exercises/interventions per flow sheet. Pain Level (0-10 scale) post treatment: 0    ASSESSMENT/Changes in Function: Reported no pain post session today.  Tenderness noted and improved with manual interventions to the left psoas. Improvement in pelvic alignment noted post manual but mild right ASIS/SIJ hypermobility noted. Good TA brace noted with sbx3 exercise. Continue POC as tolerated to improve pain and mobility. Patient will continue to benefit from skilled PT services to modify and progress therapeutic interventions, address functional mobility deficits, address ROM deficits, address strength deficits, analyze and address soft tissue restrictions, analyze and cue movement patterns, analyze and modify body mechanics/ergonomics, assess and modify postural abnormalities, address imbalance/dizziness and instruct in home and community integration to attain remaining goals. []  See Plan of Care  []  See progress note/recertification  []  See Discharge Summary         Progress towards goals / Updated goals:  Short Term Goals: To be accomplished in 2 treatments:     1. Patient will become proficient in their HEP and will be compliant in performing that program.     Evaluation: HEP established     Current: reports compliance 6/24/2022    Long Term Goals: To be accomplished in 5 weeks:    1. Patient's pain level will be 0-2/10 with activity in order to improve patient's ability to perform normal ADLs.     Evaluation: Pain 3-8/10       2. Patient will increase FOTO score >/= 66 points to indicate increased functional mobility.     Evaluation: 50 points        3.  Patient will report ability to stand/ambulate >/= 60 min in order to increase ease with recreational partocipation and demonstrate a return to overall PLOF  Evaluation: standing/ambulation tolerance reported: <5 min.     4. Patient will demonstrate right hip abd MMT >/= 4+/5 to perform ADL/IADLs with greater ease   Evaluation: NT secondary to TC - to be assessed at next scheduled f/u    PLAN  [x]  Upgrade activities as tolerated     [x]  Continue plan of care  [x]  Update interventions per flow sheet       []  Discharge due to:_  []  Other:_      Tatum Nieves, PT 6/24/2022  3:33 PM    Future Appointments   Date Time Provider Dwight Pascal   6/24/2022  3:00 PM Christoph Dalal, PT [facility]   6/28/2022  1:30 PM Charu Barefoot [facility]   7/5/2022  1:30 PM Emry Printers, PTA [facility]   7/7/2022  1:30 PM Emry Printers, PTA [facility]   7/12/2022  3:00 PM Charu Barefoot [facility]   7/13/2022  2:20 PM DO CAROLINA Cortes BS AMB   7/14/2022  2:15 PM Barrington Lovett, DPT [facility]   7/19/2022  3:00 PM Charu Barefoot [facility]   7/21/2022  3:00 PM Charu Barefoot [facility]   8/10/2022 10:05 AM [facility] LAB VISIT [facility] BS AMB   8/18/2022  9:30 AM Gutierrez Sanchez DNP [facility] BS AMB   9/19/2022  1:30 PM Eliza Mathis MD [facility] BS AMB

## 2022-06-28 ENCOUNTER — APPOINTMENT (OUTPATIENT)
Dept: PHYSICAL THERAPY | Age: 52
End: 2022-06-28
Attending: FAMILY MEDICINE
Payer: COMMERCIAL

## 2022-06-29 ENCOUNTER — HOSPITAL ENCOUNTER (OUTPATIENT)
Dept: PHYSICAL THERAPY | Age: 52
Discharge: HOME | End: 2022-06-29
Attending: FAMILY MEDICINE
Payer: COMMERCIAL

## 2022-06-29 PROCEDURE — 97140 MANUAL THERAPY 1/> REGIONS: CPT

## 2022-06-29 PROCEDURE — 97110 THERAPEUTIC EXERCISES: CPT

## 2022-06-29 PROCEDURE — 97112 NEUROMUSCULAR REEDUCATION: CPT

## 2022-06-29 NOTE — PROGRESS NOTES
PT DAILY TREATMENT NOTE     Patient Name: Cristal Ortiz  Date:2022  : 1970  [x]  Patient  Verified  Payor: Gerardo Flores / Plan: [place] [employer] PPO / Product Type: PPO /    In time: 2:18  Out time: 3:05  Total Treatment Time (min): 47  Visit #: 3 of 10    Treatment Area: Other low back pain [M54.59]    SUBJECTIVE  Pain Level (0-10 scale): 2 sore>pain  Any medication changes, allergies to medications, adverse drug reactions, diagnosis change, or new procedure performed?: [x] No    [] Yes (see summary sheet for update)  Subjective functional status/changes:   [] No changes reported  Pt reports being sore after the last session. He reports he threw yesterday and had more soreness and no pain. OBJECTIVE    10 min Therapeutic Exercise:  [x] See flow sheet :   Rationale: increase ROM and increase strength to improve the patients ability to tolerate ADLs    29 min Neuromuscular Re-education:  [x]  See flow sheet : glute/core stability exercises   Rationale: increase strength, improve coordination and increase proprioception  to improve the patients ability to tolerate daily tasks. 8 min Manual Therapy: In supine: pelvic alignment and leg length assessments, in B s/l: MET to correct sacral torsion. The manual therapy interventions were performed at a separate and distinct time from the therapeutic activities interventions. Rationale: decrease pain, increase ROM, increase tissue extensibility and increase postural awareness to tolerate functional tasks. With   [x] TE   [] TA   [x] neuro   [] other: Patient Education: [x] Review HEP    [] Progressed/Changed HEP based on:   [x] positioning   [x] body mechanics   [] transfers   [] heat/ice application    [] other:      Other Objective/Functional Measures: Improvement in pelvic alignment noted with MET for sacral torsion with laying on his left side.      Pain Level (0-10 scale) post treatment: 0    ASSESSMENT/Changes in Function: Reported no pain post session today. Reported no pain post METs and plinth exercises. Reported having tightness in the low back post standing exercises (no pain) and LTR after these exercises helped with the pain. Educated pt that he can perform self MET on the left side and LTR at home to improve pain and tightness. Pt seems to be responding well to therapy interventions with less pain and improved activity tolerance since the evaluation. Continue POC as tolerated. Patient will continue to benefit from skilled PT services to modify and progress therapeutic interventions, address functional mobility deficits, address ROM deficits, address strength deficits, analyze and address soft tissue restrictions, analyze and cue movement patterns, analyze and modify body mechanics/ergonomics, assess and modify postural abnormalities, address imbalance/dizziness and instruct in home and community integration to attain remaining goals. []  See Plan of Care  []  See progress note/recertification  []  See Discharge Summary         Progress towards goals / Updated goals:  Short Term Goals: To be accomplished in 2 treatments:     1. Patient will become proficient in their HEP and will be compliant in performing that program.     Evaluation: HEP established     Current: reports compliance 6/24/2022    Long Term Goals: To be accomplished in 5 weeks:    1. Patient's pain level will be 0-2/10 with activity in order to improve patient's ability to perform normal ADLs.     Evaluation: Pain 3-8/10  Progressing 2/10 pre session today 6/29/2022       2. Patient will increase FOTO score >/= 66 points to indicate increased functional mobility.     Evaluation: 50 points        3.  Patient will report ability to stand/ambulate >/= 60 min in order to increase ease with recreational partocipation and demonstrate a return to overall PLOF  Evaluation: standing/ambulation tolerance reported: <5 min.     4. Patient will demonstrate right hip abd MMT >/= 4+/5 to perform ADL/IADLs with greater ease   Evaluation: NT secondary to TC - to be assessed at next scheduled f/u    PLAN  [x]  Upgrade activities as tolerated     [x]  Continue plan of care  [x]  Update interventions per flow sheet       []  Discharge due to:_  []  Other:_      Keysha Valdivia, PT 6/29/2022  2:30 PM    Future Appointments   Date Time Provider Dwight Naida   7/5/2022  1:30 PM Lora Florez PTA [facility]   7/7/2022  1:30 PM Lora Florez PTA [facility]   7/12/2022  3:00 PM Sue Guadeloupe [facility]   7/13/2022  2:20 PM DO CAROLINA Greenwood BS AMB   7/14/2022  2:15 PM Agustin Mcclain DPT [facility]   7/19/2022  3:00 PM Sue Guadeloupe [facility]   7/21/2022  3:00 PM Sue GuJamaicaoupe [facility]   8/10/2022 10:05 AM [facility] LAB VISIT [facility] BS AMB   8/18/2022  9:30 AM Miladys Parker DNP [facility] BS AMB   9/19/2022  1:30 PM Amauri Hodge MD [facility] BS AMB

## 2022-07-05 ENCOUNTER — HOSPITAL ENCOUNTER (OUTPATIENT)
Dept: PHYSICAL THERAPY | Age: 52
Discharge: HOME | End: 2022-07-05
Attending: FAMILY MEDICINE
Payer: COMMERCIAL

## 2022-07-05 PROCEDURE — 97112 NEUROMUSCULAR REEDUCATION: CPT

## 2022-07-05 PROCEDURE — 97140 MANUAL THERAPY 1/> REGIONS: CPT

## 2022-07-05 PROCEDURE — 97110 THERAPEUTIC EXERCISES: CPT

## 2022-07-05 NOTE — PROGRESS NOTES
PT DAILY TREATMENT NOTE     Patient Name: Ko Khoury  Date:2022  : 1970  [x]  Patient  Verified  Payor: [employer] / Plan: [place] / Product Type: PPO /    In time:138  Out time:220  Total Treatment Time (min): 42  Visit #: 4 of 10    Medicare/BCBS Only   Total Timed Codes (min):   1:1 Treatment Time:         Treatment Area: Other low back pain [M54.59]    SUBJECTIVE  Pain Level (0-10 scale): 3  Any medication changes, allergies to medications, adverse drug reactions, diagnosis change, or new procedure performed?: [x] No    [] Yes (see summary sheet for update)  Subjective functional status/changes:   [] No changes reported  Patient reported increased pain after working more over the weekend     OBJECTIVE      12 min Therapeutic Exercise:  [] See flow sheet :   Rationale: increase ROM and increase strength to improve the patients ability to perform ADLs         20 min Neuromuscular Re-education:  []  See flow sheet :   Rationale: increase strength and improve coordination  to improve the patients ability to perform ADLs    10 min Manual Therapy:   pelvic alignment and leg length assessments, in B s/l: MET to correct sacral torsion. Manual IR B hip stretching in prone   The manual therapy interventions were performed at a separate and distinct time from the therapeutic activities interventions.   Rationale: decrease pain, increase ROM, increase tissue extensibility and decrease trigger points to perform ALDs          With   [] TE   [] TA   [] neuro   [] other: Patient Education: [x] Review HEP    [] Progressed/Changed HEP based on:   [] positioning   [] body mechanics   [] transfers   [] heat/ice application    [] other:      Other Objective/Functional Measures: very limited B hip IR and sacral torsion upon arrrival - improved alignment and pain post treatment    Pain Level (0-10 scale) post treatment: 1    ASSESSMENT/Changes in Function: patient tolerated therex well with decreased pain post treatment    Patient will continue to benefit from skilled PT services to modify and progress therapeutic interventions, address functional mobility deficits, address ROM deficits, address strength deficits, analyze and address soft tissue restrictions and analyze and cue movement patterns to attain remaining goals. [x]  See Plan of Care  []  See progress note/recertification  []  See Discharge Summary         Progress towards goals / Updated goals:  Short Term Goals: To be accomplished in 2 treatments:     1. Patient will become proficient in their HEP and will be compliant in performing that program.     Evaluation: HEP established     Current: reports compliance 6/24/2022     Long Term Goals: To be accomplished in 5 weeks:    1. Patient's pain level will be 0-2/10 with activity in order to improve patient's ability to perform normal ADLs.     Evaluation: Pain 3-8/10  Progressing 2/10 pre session today 6/29/2022       2. Patient will increase FOTO score >/= 66 points to indicate increased functional mobility.     Evaluation: 50 points        3.  Patient will report ability to stand/ambulate >/= 60 min in order to increase ease with recreational partocipation and demonstrate a return to overall PLOF  Evaluation: standing/ambulation tolerance reported: <5 min.     4. Patient will demonstrate right hip abd MMT >/= 4+/5 to perform ADL/IADLs with greater ease   Evaluation: NT secondary to TC - to be assessed at next scheduled f/u       PLAN  [x]  Upgrade activities as tolerated     [x]  Continue plan of care  []  Update interventions per flow sheet       []  Discharge due to:_  []  Other:_      Omero Phillip PTA 7/5/2022  1:43 PM    Future Appointments   Date Time Provider Dwight Pascal   7/7/2022  1:30 PM [facility]   7/12/2022  3:00 PM Lad[facility]   7/13/2022  2:20 PM DO CAROLINA Claudio BS AMB   7/14/2022  2:15 PM [facility] 7/19/2022  3:00 PM Michellea Favor [facility]   7/21/2022  3:00 PM Lynnda Favor [facility]   8/10/2022 10:05 AM [facility] LAB VISIT [facility] BS AMB   8/18/2022  9:30 AM Ronan Stockton [facility] BS AMB   9/19/2022  1:30 PM Gricel Guajardo MD [facility] BS AMB

## 2022-07-07 ENCOUNTER — APPOINTMENT (OUTPATIENT)
Dept: PHYSICAL THERAPY | Age: 52
End: 2022-07-07
Attending: FAMILY MEDICINE
Payer: COMMERCIAL

## 2022-07-12 ENCOUNTER — HOSPITAL ENCOUNTER (OUTPATIENT)
Dept: PHYSICAL THERAPY | Age: 52
Discharge: HOME | End: 2022-07-12
Attending: FAMILY MEDICINE
Payer: COMMERCIAL

## 2022-07-12 PROCEDURE — 97110 THERAPEUTIC EXERCISES: CPT

## 2022-07-12 PROCEDURE — 97112 NEUROMUSCULAR REEDUCATION: CPT

## 2022-07-12 NOTE — PROGRESS NOTES
PT DAILY TREATMENT NOTE     Patient Name: Ban Vasquez  Date:2022  : 1970  [x]  Patient  Verified  Payor: Marga Lipoma / Plan: [place] [employer] / Product Type: PPO /    In time:3:00pm  Out time:3:50pm  Total Treatment Time (min): 50  Visit #: 5 of 10    Medicare/BCBS Only   Total Timed Codes (min):   1:1 Treatment Time:         Treatment Area: Other low back pain [M54.59]    SUBJECTIVE  Pain Level (0-10 scale): 3  Any medication changes, allergies to medications, adverse drug reactions, diagnosis change, or new procedure performed?: [x] No    [] Yes (see summary sheet for update)  Subjective functional status/changes:   [] No changes reported  \"I did a lot of walking on uneven terrain. I was going up and down the dunes. \"    OBJECTIVE    25 min Therapeutic Exercise:  [] See flow sheet :   Rationale: increase ROM and increase strength to improve the patients ability to perform daily activities with ease     25 min Neuromuscular Re-education:  []  See flow sheet :   Rationale: increase ROM, increase strength, improve coordination and increase proprioception  to improve the patients ability to maintain core stability with movement and increase hip ROM to increase stability/symmetry         With   [x] TE   [] TA   [] neuro   [] other: Patient Education: [x] Review HEP    [] Progressed/Changed HEP based on:   [] positioning   [] body mechanics   [] transfers   [] heat/ice application    [] other:      Other Objective/Fuctional Measures:      Pain Level (0-10 scale) post treatment: 3    ASSESSMENT/Changes in Function: Todays session focused on interventions to increase core strength and stability and hip ROM to decrease strain on low back. Patient was challenged with hip internal rotation interventions reporting tightness and same pain level by the end of today's session.      Patient will continue to benefit from skilled PT services to modify and progress therapeutic interventions, address functional mobility deficits, address ROM deficits, address strength deficits, analyze and address soft tissue restrictions and analyze and cue movement patterns to attain remaining goals. []  See Plan of Care  []  See progress note/recertification  []  See Discharge Summary         Progress towards goals / Updated goals:  Short Term Goals: To be accomplished in 2 treatments:     1. Patient will become proficient in their HEP and will be compliant in performing that program.     Evaluation: HEP established     Current: MET -  reports compliance 6/24/2022     Long Term Goals: To be accomplished in 5 weeks:    1. Patient's pain level will be 0-2/10 with activity in order to improve patient's ability to perform normal ADLs.     Evaluation: Pain 3-8/10  Current: Progressing 2/10 pre session today 6/29/2022       2. Patient will increase FOTO score >/= 66 points to indicate increased functional mobility.     Evaluation: 50 points      Current: Assess at MD note (7/12/2022)  3. Patient will report ability to stand/ambulate >/= 60 min in order to increase ease with recreational partocipation and demonstrate a return to overall PLOF  Evaluation: standing/ambulation tolerance reported: <5 min.   Current:  4.  Patient will demonstrate right hip abd MMT >/= 4+/5 to perform ADL/IADLs with greater ease   Evaluation: NT secondary to TC - to be assessed at next scheduled f/u   Current:    PLAN  [x]  Upgrade activities as tolerated     [x]  Continue plan of care  [x]  Update interventions per flow sheet       []  Discharge due to:_  []  Other:_      Ben Arreaga, PT 7/12/2022  3:13 PM    Future Appointments   Date Time Provider Dwight Pascal   7/14/2022  2:15 PM Kaity Lucia DPT [facility]   7/19/2022  3:00 PM Felice Velazquez [facility]   7/21/2022  3:00 PM Felice Velazquez [facility]   7/25/2022  2:20 PM DO CAROLINA Castro BS AMB   8/10/2022 10:05 AM IO LAB VISIT IO BS AMB   8/18/2022  9:30 AM [place], Giovanny Da Silva, [facility] BS AMB   9/19/2022  1:30 PM Gricel Guajardo MD [facility] BS AMB

## 2022-07-14 ENCOUNTER — HOSPITAL ENCOUNTER (OUTPATIENT)
Dept: PHYSICAL THERAPY | Age: 52
Discharge: HOME | End: 2022-07-14
Attending: FAMILY MEDICINE
Payer: COMMERCIAL

## 2022-07-14 PROCEDURE — 97112 NEUROMUSCULAR REEDUCATION: CPT

## 2022-07-14 PROCEDURE — 97140 MANUAL THERAPY 1/> REGIONS: CPT

## 2022-07-14 PROCEDURE — 97110 THERAPEUTIC EXERCISES: CPT

## 2022-07-14 RX ORDER — ISOSORBIDE MONONITRATE 30 MG/1
TABLET, EXTENDED RELEASE ORAL
Qty: 30 TABLET | Refills: 6 | Status: SHIPPED | OUTPATIENT
Start: 2022-07-14 | End: 2022-09-19

## 2022-07-14 NOTE — PROGRESS NOTES
PT DAILY TREATMENT NOTE     Patient Name: Reji Villalpando  Date:2022  : 1970  [x]  Patient  Verified  Payor: [employer] / Plan: [place] [employer] PPO / Product Type: PPO /    In time: 2:15P Out time: 2:58P  Total Treatment Time (min): 43  Visit #: 6 of 10    Treatment Area: Other low back pain [M54.59]    SUBJECTIVE  Pain Level (0-10 scale): 5  Any medication changes, allergies to medications, adverse drug reactions, diagnosis change, or new procedure performed?: [x] No    [] Yes (see summary sheet for update)  Subjective functional status/changes:   [] No changes reported  Pt arrives to session stating \"today is not a good day\", further reporting waking up this morning with significant flare up of low back pain (right>left). States feeling fine following previous session, however is unsure if walking for prolonged durations/sitting in car for prolonged durations yesterday caused the increase in symptoms he is experiencing today. OBJECTIVE    24 min Therapeutic Exercise:  [x] See flow sheet :   Rationale: increase ROM and increase strength to improve the patients ability to perform daily activities with ease     9 min Neuromuscular Re-education:  [x]  See flow sheet :   Rationale: increase strength, improve coordination and increase proprioception  to improve the patients ability to maintain core stability with movement and increase hip ROM to increase stability/symmetry    10 min Manual Therapy: In supine: In supine: left LE pull to correct left innominate upslip, MET to correct left anterior/right anterior innominates; (left sidelying): TPr/STM to right glute med . The manual therapy interventions were performed at a separate and distinct time from the therapeutic activities interventions.   Rationale: decrease pain, increase ROM, increase tissue extensibility and increase postural awareness to increase tolerance to therex and overall return to PLOF                         With   [x] TE   [] TA   [x] neuro   [] other: Patient Education: [x] Review HEP    [] Progressed/Changed HEP based on:   [] positioning   [] body mechanics   [] transfers   [] heat/ice application    [x] other:  pt ed on importance of completing HEP in order to maximize therapeutic potential and reach LTGs - pt verbally demonstrates understanding                                                  Other Objective/Functional Measures:  Left pelvic upslip  Left posterior flexibility restrictions/Right anterior chain flexibility restrictions   Modified session secondary to flare up;added SLR to ex program     Pain Level (0-10 scale) post treatment: 2    ASSESSMENT/Changes in Function:   Pt w/ positive response to MET for pelvic obliquity and MT to address tissue restrictions. Modifications to session made in order to emphasize foundational core stability, B LE strength and flexibility deficits noted above - in which pt responds well reporting a reduction in pain levels post session. Pt ed (see above) provided. Will continue to progress activity program as able and tolerated. Patient will continue to benefit from skilled PT services to modify and progress therapeutic interventions, address functional mobility deficits, address ROM deficits, address strength deficits, analyze and address soft tissue restrictions and analyze and cue movement patterns to attain remaining goals. [x]  See Plan of Care  []  See progress note/recertification  []  See Discharge Summary         Progress towards goals / Updated goals:  Short Term Goals: To be accomplished in 2 treatments:     1. Patient will become proficient in their HEP and will be compliant in performing that program.     Evaluation: HEP established     Current: MET -  reports compliance 6/24/2022     Long Term Goals: To be accomplished in 5 weeks:    1. Patient's pain level will be 0-2/10 with activity in order to improve patient's ability to perform normal ADLs.      Evaluation: Pain 3-8/10  Current: Progressing 2/10 pre session today 6/29/2022       2. Patient will increase FOTO score >/= 66 points to indicate increased functional mobility.     Evaluation: 50 points      Current: Assess at MD note (7/12/2022)  3. Patient will report ability to stand/ambulate >/= 60 min in order to increase ease with recreational partocipation and demonstrate a return to overall PLOF  Evaluation: standing/ambulation tolerance reported: <5 min.   Current:  4.  Patient will demonstrate right hip abd MMT >/= 4+/5 to perform ADL/IADLs with greater ease   Evaluation: NT secondary to TC - to be assessed at next scheduled f/u   Current:    PLAN  [x]  Upgrade activities as tolerated     [x]  Continue plan of care  [x]  Update interventions per flow sheet       []  Discharge due to:_  []  Other:_      Counts include [facility] beds at the [facility], DPT, PT 7/14/2022  3:13 PM    Future Appointments   Date Time Provider Dwight Pascal   7/19/2022  3:00 [facility]   7/21/2022  3:00 PM [facility]   7/25/2022  2:20 PM DO CAROLINA Guillen BS AMB   8/10/2022 10:05 AM IO LAB VISIT IO BS AMB   8/18/2022  9:30 AM Farhan Escoto DNP [facility] BS AMB   9/19/2022  1:30 PM Jian Klein MD [facility] BS AMB

## 2022-07-19 ENCOUNTER — APPOINTMENT (OUTPATIENT)
Dept: PHYSICAL THERAPY | Age: 52
End: 2022-07-19
Attending: FAMILY MEDICINE
Payer: COMMERCIAL

## 2022-07-21 ENCOUNTER — APPOINTMENT (OUTPATIENT)
Dept: PHYSICAL THERAPY | Age: 52
End: 2022-07-21
Attending: FAMILY MEDICINE
Payer: COMMERCIAL

## 2022-07-21 ENCOUNTER — TELEPHONE (OUTPATIENT)
Dept: PHYSICAL THERAPY | Age: 52
End: 2022-07-21

## 2022-07-25 ENCOUNTER — OFFICE VISIT (OUTPATIENT)
Dept: ORTHOPEDIC SURGERY | Age: 52
End: 2022-07-25
Payer: COMMERCIAL

## 2022-07-25 VITALS
RESPIRATION RATE: 14 BRPM | WEIGHT: 280 LBS | HEART RATE: 72 BPM | HEIGHT: 70 IN | BODY MASS INDEX: 40.09 KG/M2 | OXYGEN SATURATION: 96 %

## 2022-07-25 DIAGNOSIS — M99.04 SACRAL REGION SOMATIC DYSFUNCTION: ICD-10-CM

## 2022-07-25 DIAGNOSIS — M51.36 DEGENERATIVE DISC DISEASE, LUMBAR: Primary | ICD-10-CM

## 2022-07-25 DIAGNOSIS — G57.01 PIRIFORMIS SYNDROME, RIGHT: ICD-10-CM

## 2022-07-25 DIAGNOSIS — M99.06 LOWER LIMB REGION SOMATIC DYSFUNCTION: ICD-10-CM

## 2022-07-25 DIAGNOSIS — M99.08 RIB CAGE REGION SOMATIC DYSFUNCTION: ICD-10-CM

## 2022-07-25 DIAGNOSIS — M99.02 THORACIC REGION SOMATIC DYSFUNCTION: ICD-10-CM

## 2022-07-25 DIAGNOSIS — M99.07 UPPER EXTREMITY SOMATIC DYSFUNCTION: ICD-10-CM

## 2022-07-25 DIAGNOSIS — M99.09 SOMATIC DYSFUNCTION OF ABDOMINAL REGION: ICD-10-CM

## 2022-07-25 DIAGNOSIS — M99.03 LUMBAR REGION SOMATIC DYSFUNCTION: ICD-10-CM

## 2022-07-25 DIAGNOSIS — M99.01 CERVICAL SOMATIC DYSFUNCTION: ICD-10-CM

## 2022-07-25 DIAGNOSIS — M99.05 PELVIC SOMATIC DYSFUNCTION: ICD-10-CM

## 2022-07-25 PROCEDURE — 98929 OSTEOPATH MANJ 9-10 REGIONS: CPT | Performed by: FAMILY MEDICINE

## 2022-07-25 PROCEDURE — 99213 OFFICE O/P EST LOW 20 MIN: CPT | Performed by: FAMILY MEDICINE

## 2022-07-25 RX ORDER — ETODOLAC 400 MG/1
400 TABLET, FILM COATED ORAL 2 TIMES DAILY WITH MEALS
Qty: 60 TABLET | Refills: 1 | Status: SHIPPED | OUTPATIENT
Start: 2022-07-25 | End: 2022-10-03

## 2022-07-25 NOTE — PROGRESS NOTES
HISTORY OF PRESENT ILLNESS    Abilio García 1970 is a 45y.o. year old male comes in today to be evaluated and treated for: right sciatica    Since last appt has noticed pain improved with PT 2/week. Pain level 3/10. Using voltaren 50mg with benefit prior but not nearly as noticeable. Still learning more on how to improve/maintain. Able to do yard work and recovery from it much better. IMAGING: XR lumbar 5/25/2022  IMPRESSION  1. No acute compression deformity or spondylolisthesis. 2. Moderate degenerative changes in the anterior as well as posterior elements  throughout lumbar spine. Past Surgical History:   Procedure Laterality Date    HX HEENT      tonsillectomy    HX LITHOTRIPSY  10/9/2010    Left upper 3rd ureteral calculus    HX ORTHOPAEDIC      Left shoulder     Social History     Socioeconomic History    Marital status:    Tobacco Use    Smoking status: Never    Smokeless tobacco: Never   Vaping Use    Vaping Use: Never used   Substance and Sexual Activity    Alcohol use: Yes     Alcohol/week: 2.0 standard drinks     Types: 2 Cans of beer per week     Comment: occasionally     Drug use: No     Current Outpatient Medications   Medication Sig Dispense Refill    isosorbide mononitrate ER (IMDUR) 30 mg tablet TAKE 1 TABLET BY MOUTH EVERY DAY IN THE MORNING 30 Tablet 6    diclofenac EC (VOLTAREN) 50 mg EC tablet Take 1 Tablet by mouth two (2) times daily (with meals). 60 Tablet 1    spironolactone (ALDACTONE) 25 mg tablet TAKE 1 TABLET BY MOUTH EVERY DAY 90 Tablet 0    carvediloL (COREG) 12.5 mg tablet Take 1 Tablet by mouth two (2) times daily (with meals). 60 Tablet 4    LORazepam (ATIVAN) 0.5 mg tablet Take 1 Tablet by mouth nightly as needed for Anxiety. Max Daily Amount: 0.5 mg. 30 Tablet 1    atorvastatin (LIPITOR) 40 mg tablet Take 1 Tablet by mouth daily.  30 Tablet 6    aspirin delayed-release 81 mg tablet TAKE 1 TABLET BY MOUTH EVERY DAY 30 Tablet 6    nitroglycerin (NITROSTAT) 0.4 mg SL tablet 1 Tab by SubLINGual route every five (5) minutes as needed for Chest Pain. Up to 3 doses. (Patient not taking: No sig reported) 25 Tab 3     Past Medical History:   Diagnosis Date    Alcohol consumption of one to four drinks per day 8/31/2021    Anxiety 8/31/2021    Asthma     Bilateral lower extremity edema 8/31/2021    Calculus of kidney     Coronary artery disease involving native coronary artery of native heart without angina pectoris 8/9/2021    Elevated LFTs 2/24/2022    HLD (hyperlipidemia)     Hypertension     Hypokalemia 9/17/2014    Obesity (BMI 30-39.9) 2/5/2018    Paresthesias/numbness 6/13/2018    Prediabetes 2/24/2022    Primary hypertension 6/13/2018    S/P cardiac cath 11/2020    Borderline mid LAD and mid LCx ( iFR of both lesion, not significant)     Sleep apnea     not using CPAP    Venous insufficiency of both lower extremities 1/15/2018     History reviewed. No pertinent family history. ROS:  Very rare radiation down right leg/buttock w/ tingle and numbness great toe. No incont, fever    Objective:  Pulse 72   Resp 14   Ht 5' 10\" (1.778 m)   Wt 280 lb (127 kg)   SpO2 96%   BMI 40.18 kg/m²   NEURO:  Sensation intact to light touch. Reflexes +2/4 patellar and Achilles bilaterally. M/S:  Examined seated and supine. Slump negative. Standing flexion test positive left  Sphinx test positive left. ASIS high left  Iliac crests high left Pubes equal bilaterally Medial malleolus high left  Sacral base posterior left  KEN low right  TTA at C7 on left worse flexion, T2, 3, 4 on left worse flexion and L2, 4, 5 on left worse flexion Rib(s) 2, 3 TTP and posterior left LE Strength +5/5 bilaterally Piriformis tight and TTP right Thoracic diaphragm restricted left. Scapula motion restricted w/ TTA right. Hip flexion limited left. Assessment/Plan:     ICD-10-CM ICD-9-CM    1. Degenerative disc disease, lumbar  M51.36 722.52 etodolac (LODINE) 400 mg tablet      2.  Piriformis syndrome, right  G57.01 355.0 etodolac (LODINE) 400 mg tablet      3. Lumbar region somatic dysfunction  M99.03 739.3 [place] OSTEOPATHIC MANIP,9-10 BODY REGN      4. Pelvic somatic dysfunction  M99.05 739.5 [place] OSTEOPATHIC MANIP,9-10 BODY REGN      5. Sacral region somatic dysfunction  M99.04 739.4 [place] OSTEOPATHIC MANIP,9-10 BODY REGN      6. Thoracic region somatic dysfunction  M99.02 739.2 [place] OSTEOPATHIC MANIP,9-10 BODY REGN      7. Rib cage region somatic dysfunction  M99.08 739.8 [place] OSTEOPATHIC MANIP,9-10 BODY REGN      8. Cervical somatic dysfunction  M99.01 739.1 [place] OSTEOPATHIC MANIP,9-10 BODY REGN      9. Upper extremity somatic dysfunction  M99.07 739.7 WA OSTEOPATHIC MANIP,9-10 BODY REGN      10. Lower limb region somatic dysfunction  M99.06 739.6 [place] OSTEOPATHIC MANIP,9-10 BODY REGN      11. Somatic dysfunction of abdominal region  M99.09 739.9 [address]          Patient (or guardian if minor) verbalizes understanding of evaluation and plan. Verbal consent obtained. Cervical, Thoracic, Rib, Lumbar, Pelvic, Sacral, Lower Ext, Upper Ext, and Abdominal SD treated with myofascial and ME. Correction of previous malalignments verified after Tx. Pt tolerated well. Notes improvement of Sx and pain is now rated 1/10. HEP/stretches daily. Discussed stretching/strengthening/posture. Will continue HEP, PT, and Rx for lodine PRN  as above and plan follow-up as needed.

## 2022-07-25 NOTE — LETTER
7/25/2022    Patient: Demarcus Jackson   YOB: 1970   Date of Visit: 7/25/2022     Niyah Remy DNP  [address] [address]  [place] [address]    Dear Niyah Remy DNP,      Thank you for referring Mr. Babita Rizo to [place] [address]. for evaluation. My notes for this consultation are attached. If you have questions, please do not hesitate to call me. I look forward to following your patient along with you.       Sincerely,    Radha Cole, DO

## 2022-08-10 DIAGNOSIS — E87.6 HYPOKALEMIA: ICD-10-CM

## 2022-08-10 DIAGNOSIS — I10 ESSENTIAL HYPERTENSION: ICD-10-CM

## 2022-08-10 RX ORDER — SPIRONOLACTONE 25 MG/1
TABLET ORAL
Qty: 90 TABLET | Refills: 0 | OUTPATIENT
Start: 2022-08-10

## 2022-08-25 DIAGNOSIS — I10 ESSENTIAL HYPERTENSION: ICD-10-CM

## 2022-08-25 DIAGNOSIS — F41.9 ANXIETY: ICD-10-CM

## 2022-08-25 DIAGNOSIS — E87.6 HYPOKALEMIA: ICD-10-CM

## 2022-08-25 RX ORDER — LORAZEPAM 0.5 MG/1
0.5 TABLET ORAL
Qty: 30 TABLET | OUTPATIENT
Start: 2022-08-25

## 2022-08-25 RX ORDER — SPIRONOLACTONE 25 MG/1
TABLET ORAL
Qty: 90 TABLET | Refills: 0 | OUTPATIENT
Start: 2022-08-25

## 2022-08-25 NOTE — TELEPHONE ENCOUNTER
Pt cx 8/18/22 lab and f/u appt. Requested Prescriptions     Refused Prescriptions Disp Refills    spironolactone (ALDACTONE) 25 mg tablet [Pharmacy Med Name: SPIRONOLACTONE 25 MG TABLET] 90 Tablet 0     Sig: TAKE 1 TABLET BY MOUTH EVERY DAY     Refused By: Lora Tran     Reason for Refusal: Appt required, please call patient    LORazepam (ATIVAN) 0.5 mg tablet [Pharmacy Med Name: LORAZEPAM 0.5 MG TABLET] 30 Tablet      Sig: TAKE 1 TABLET BY MOUTH NIGHTLY AS NEEDED FOR ANXIETY. MAX DAILY AMOUNT: 0.5 MG.      Refused By: Lora Tran     Reason for Refusal: Appt required, please call patient

## 2022-09-06 ENCOUNTER — TELEPHONE (OUTPATIENT)
Dept: INTERNAL MEDICINE CLINIC | Age: 52
End: 2022-09-06

## 2022-09-06 NOTE — TELEPHONE ENCOUNTER
----- Message from Prince Huynh sent at 9/6/2022 12:50 PM EDT -----  Subject: Appointment Request    Reason for Call: Established Patient Appointment needed: Routine Existing   Condition Follow Up    QUESTIONS    Reason for appointment request? Available appointments did not meet   patient need     Additional Information for Provider? Patient called in to reschedule his   lab appointment as well as his follow up,. There was not any appointment   available before October and he would like to be seen before then so he   doesn't run out of his blood pressure medication.  Please advise   ---------------------------------------------------------------------------  --------------  Cortney SHEN  [phone]; OK to leave message on voicemail  ---------------------------------------------------------------------------  --------------  SCRIPT ANSWERS  COVID Screen: Mario Lorenzana

## 2022-09-06 NOTE — TELEPHONE ENCOUNTER
Appt scheduled for tomorrow at 2:15PM.   He stated he had to cancel lab and follow up appt in August due to having to go out of town for work. You rnote from his 02/24/22 ov indicated you wanted labs in 6 months. I don't see any lab orders in [facility]. Pt is out of medication. He will come in tomorrow for appt. If labs are needed we can draw after appt if appropriate or he will come back another day.      Thank you

## 2022-09-07 ENCOUNTER — OFFICE VISIT (OUTPATIENT)
Dept: INTERNAL MEDICINE CLINIC | Age: 52
End: 2022-09-07
Payer: COMMERCIAL

## 2022-09-07 VITALS
HEIGHT: 70 IN | RESPIRATION RATE: 18 BRPM | TEMPERATURE: 98.4 F | DIASTOLIC BLOOD PRESSURE: 87 MMHG | WEIGHT: 286 LBS | BODY MASS INDEX: 40.94 KG/M2 | HEART RATE: 74 BPM | OXYGEN SATURATION: 96 % | SYSTOLIC BLOOD PRESSURE: 124 MMHG

## 2022-09-07 DIAGNOSIS — E78.2 MIXED HYPERLIPIDEMIA: ICD-10-CM

## 2022-09-07 DIAGNOSIS — E87.6 HYPOKALEMIA: ICD-10-CM

## 2022-09-07 DIAGNOSIS — R63.5 WEIGHT GAIN: ICD-10-CM

## 2022-09-07 DIAGNOSIS — R60.0 BILATERAL LOWER EXTREMITY EDEMA: ICD-10-CM

## 2022-09-07 DIAGNOSIS — I10 ESSENTIAL HYPERTENSION: Primary | ICD-10-CM

## 2022-09-07 DIAGNOSIS — R79.89 ELEVATED LFTS: Primary | ICD-10-CM

## 2022-09-07 DIAGNOSIS — I10 ESSENTIAL HYPERTENSION: ICD-10-CM

## 2022-09-07 DIAGNOSIS — F41.9 ANXIETY: ICD-10-CM

## 2022-09-07 DIAGNOSIS — R73.03 PREDIABETES: ICD-10-CM

## 2022-09-07 PROCEDURE — 99214 OFFICE O/P EST MOD 30 MIN: CPT | Performed by: NURSE PRACTITIONER

## 2022-09-07 RX ORDER — LORAZEPAM 0.5 MG/1
0.5 TABLET ORAL
Qty: 30 TABLET | Refills: 1 | Status: SHIPPED | OUTPATIENT
Start: 2022-09-07

## 2022-09-07 RX ORDER — SPIRONOLACTONE 25 MG/1
25 TABLET ORAL DAILY
Qty: 90 TABLET | Refills: 1 | Status: SHIPPED | OUTPATIENT
Start: 2022-09-07

## 2022-09-07 NOTE — PROGRESS NOTES
Chief Complaint   Patient presents with    Hypertension     6 month f/u     1. \"Have you been to the ER, urgent care clinic since your last visit? Hospitalized since your last visit? \" No    2. \"Have you seen or consulted any other health care providers outside of the [address] [place] [address] since your last visit? \" No     3. For patients aged 39-70: Has the patient had a colonoscopy / FIT/ Cologuard? No      If the patient is female:    4. For patients aged 41-77: Has the patient had a mammogram within the past 2 years? NA - based on age or sex      11. For patients aged 21-65: Has the patient had a pap smear?  NA - based on age or sex

## 2022-09-09 ENCOUNTER — HOSPITAL ENCOUNTER (OUTPATIENT)
Dept: LAB | Age: 52
Discharge: HOME | End: 2022-09-09

## 2022-09-09 DIAGNOSIS — E11.65 TYPE 2 DIABETES MELLITUS WITH HYPERGLYCEMIA, WITHOUT LONG-TERM CURRENT USE OF INSULIN (HCC): Primary | ICD-10-CM

## 2022-09-09 LAB — SENTARA SPECIMEN COL,SENBCF: NORMAL

## 2022-09-09 PROCEDURE — 99001 SPECIMEN HANDLING PT-LAB: CPT

## 2022-09-10 LAB
A-G RATIO,AGRAT: 2.6 RATIO (ref 1.1–2.6)
ALBUMIN SERPL-MCNC: 4.5 G/DL (ref 3.5–5)
ALP SERPL-CCNC: 57 U/L (ref 25–115)
ALT SERPL-CCNC: 60 U/L (ref 5–40)
ANION GAP SERPL CALC-SCNC: 12 MMOL/L (ref 3–15)
AST SERPL W P-5'-P-CCNC: 43 U/L (ref 10–37)
AVG GLU, 10930: 137 MG/DL (ref 91–123)
BILIRUB SERPL-MCNC: 0.8 MG/DL (ref 0.2–1.2)
BUN SERPL-MCNC: 17 MG/DL (ref 6–22)
CALCIUM SERPL-MCNC: 9.2 MG/DL (ref 8.4–10.5)
CHLORIDE SERPL-SCNC: 99 MMOL/L (ref 98–110)
CHOLEST SERPL-MCNC: 196 MG/DL (ref 110–200)
CO2 SERPL-SCNC: 28 MMOL/L (ref 20–32)
CREAT SERPL-MCNC: 1 MG/DL (ref 0.5–1.2)
GLOBULIN,GLOB: 1.7 G/DL (ref 2–4)
GLOMERULAR FILTRATION RATE: >60 ML/MIN/1.73 SQ.M.
GLUCOSE SERPL-MCNC: 135 MG/DL (ref 70–99)
HBA1C MFR BLD HPLC: 6.4 % (ref 4.8–5.6)
HDLC SERPL-MCNC: 37 MG/DL
HDLC SERPL-MCNC: 5.3 MG/DL (ref 0–5)
LDL/HDL RATIO,LDHD: 2.8
LDLC SERPL CALC-MCNC: 105 MG/DL (ref 50–99)
NON-HDL CHOLESTEROL, 011976: 159 MG/DL
POTASSIUM SERPL-SCNC: 3.5 MMOL/L (ref 3.5–5.5)
PROT SERPL-MCNC: 6.2 G/DL (ref 6.4–8.3)
SODIUM SERPL-SCNC: 139 MMOL/L (ref 133–145)
TRIGL SERPL-MCNC: 271 MG/DL (ref 40–149)
TSH SERPL DL<=0.005 MIU/L-ACNC: 1.27 MCU/ML (ref 0.27–4.2)
VLDLC SERPL CALC-MCNC: 54 MG/DL (ref 8–30)

## 2022-09-12 PROBLEM — E11.65 TYPE 2 DIABETES MELLITUS WITH HYPERGLYCEMIA, WITHOUT LONG-TERM CURRENT USE OF INSULIN (HCC): Status: ACTIVE | Noted: 2022-09-12

## 2022-09-12 RX ORDER — ATORVASTATIN CALCIUM 80 MG/1
80 TABLET, FILM COATED ORAL DAILY
Qty: 90 TABLET | Refills: 1 | Status: SHIPPED | OUTPATIENT
Start: 2022-09-12 | End: 2022-09-19 | Stop reason: ALTCHOICE

## 2022-09-12 NOTE — PROGRESS NOTES
[facility], [address] [place] [address] Rogerio  [phone] [id number]/[phone] fax    9/7/2022    Demarcus Jean 1970 is a pleasant [address] male. Todays concerns/HPI:  Anxiety. Worsens when traveling for work. Last week he took ativan 3-4x/w. Weight gain. 9lb gain 6m. Working on [employer] abd increasing exercise. Was unable to complete labs prior to todays appt d/t being out of town for work. Past Medical History:   Diagnosis Date    Alcohol consumption of one to four drinks per day 8/31/2021    Anxiety 8/31/2021    Asthma     Bilateral lower extremity edema 8/31/2021    Calculus of kidney     Coronary artery disease involving native coronary artery of native heart without angina pectoris 8/9/2021    Elevated LFTs 2/24/2022    HLD (hyperlipidemia)     Hypertension     Hypokalemia 9/17/2014    Obesity (BMI 30-39.9) 2/5/2018    Paresthesias/numbness 6/13/2018    Prediabetes 2/24/2022    Primary hypertension 6/13/2018    S/P cardiac cath 11/2020    Borderline mid LAD and mid LCx ( iFR of both lesion, not significant)     Sleep apnea     not using CPAP    Venous insufficiency of both lower extremities 1/15/2018     Current Outpatient Medications   Medication Sig    spironolactone (ALDACTONE) 25 mg tablet Take 1 Tablet by mouth daily. LORazepam (ATIVAN) 0.5 mg tablet Take 1 Tablet by mouth nightly as needed for Anxiety. Max Daily Amount: 0.5 mg.    etodolac (LODINE) 400 mg tablet Take 1 Tablet by mouth two (2) times daily (with meals). isosorbide mononitrate ER (IMDUR) 30 mg tablet TAKE 1 TABLET BY MOUTH EVERY DAY IN THE MORNING    carvediloL (COREG) 12.5 mg tablet Take 1 Tablet by mouth two (2) times daily (with meals). atorvastatin (LIPITOR) 40 mg tablet Take 1 Tablet by mouth daily.     aspirin delayed-release 81 mg tablet TAKE 1 TABLET BY MOUTH EVERY DAY    nitroglycerin (NITROSTAT) 0.4 mg SL tablet 1 Tab by SubLINGual route every five (5) minutes as needed for Chest Pain. Up to 3 doses. No current facility-administered medications for this visit. Review of Systems:  Pertinent items are noted in HPI. Physical:   Visit Vitals  /87   Pulse 74   Temp 98.4 °F (36.9 °C) (Temporal)   Resp 18   Ht 5' 10\" (1.778 m)   Wt 286 lb (129.7 kg)   SpO2 96%   BMI 41.04 kg/m²      Wt Readings from Last 3 Encounters:   09/07/22 286 lb (129.7 kg)   07/25/22 280 lb (127 kg)   05/25/22 282 lb (127.9 kg)       Exam:   Physical Exam  Constitutional:       Appearance: Normal appearance. He is obese. Comments: Morbidly   HENT:      Head: Normocephalic and atraumatic. Right Ear: Tympanic membrane, ear canal and external ear normal.      Left Ear: Tympanic membrane, ear canal and external ear normal.      Mouth/Throat:      Mouth: Mucous membranes are moist.   Eyes:      Extraocular Movements: Extraocular movements intact. Neck:      Vascular: No carotid bruit. Cardiovascular:      Rate and Rhythm: Normal rate and regular rhythm. Pulses: Normal pulses. Heart sounds: Normal heart sounds. Comments: Minimal swelling noted to bilateral lower extremities. Pulmonary:      Effort: Pulmonary effort is normal. No respiratory distress. Breath sounds: Normal breath sounds. No wheezing. Abdominal:      General: Abdomen is flat. Bowel sounds are normal. There is no distension. Palpations: Abdomen is soft. Tenderness: There is no abdominal tenderness. Musculoskeletal:         General: Normal range of motion. Cervical back: Normal range of motion and neck supple. Skin:     General: Skin is warm and dry. Neurological:      General: No focal deficit present. Mental Status: He is alert and oriented to person, place, and time. Psychiatric:         Mood and Affect: Mood normal.         Behavior: Behavior normal.      Body mass index is 41.04 kg/m².      Review of Data:  Patient to have labs completed on Friday due to not fasting today    Plan:    1. Essential hypertension  BP well controlled under current therapy    - spironolactone (ALDACTONE) 25 mg tablet; Take 1 Tablet by mouth daily. Dispense: 90 Tablet; Refill: 1  - METABOLIC PANEL, COMPREHENSIVE; Future (Friday)    2. Mixed hyperlipidemia  Reduce fried fatty or oily foods in diet  Limit red meats, processed foods, and alcohol. Limit fast food  Work on weight reduction  Increase water intake    - LIPID PANEL; Future (Friday)  - atorvastatin (LIPITOR) 80 mg tablet; Take 1 Tablet by mouth daily. Dispense: 90 Tablet; Refill: 1    3. Bilateral lower extremity edema    - METABOLIC PANEL, COMPREHENSIVE; Future (today)    4. Hypokalemia    - spironolactone (ALDACTONE) 25 mg tablet; Take 1 Tablet by mouth daily. Dispense: 90 Tablet; Refill: 1  - METABOLIC PANEL, COMPREHENSIVE; Future (Friday)    5. Anxiety  Refilled  Previous refill 2/24/2022  I have reviewed this patient's controlled substance prescription history through the Prescription Monitoring Program. No inconsistencies noted. - LORazepam (ATIVAN) 0.5 mg tablet; Take 1 Tablet by mouth nightly as needed for Anxiety. Max Daily Amount: 0.5 mg.  Dispense: 30 Tablet; Refill: 1    6. Prediabetes    - HEMOGLOBIN A1C WITH EAG; Future (Friday)    7. Weight gain  9 pound weight gain in 6 months  BMI is out of normal parameters and plan is as follows: Discussed in great detail on diet, portion control, exercise, avoiding foods high in sugar, carbs and starches, fatty/greasy foods and to eat until satisfied not til full. I have counseled this patient on diet and exercise regimens as well. - HEMOGLOBIN A1C WITH EAG; Future (Friday)  - TSH 3RD GENERATION; Future (Friday)      Reviewed medication and completed medication reconciliation with the patient. Reviewed side effects of medications with the patient. Questions were answered and patient verb understanding.     Past Surgical History:   Procedure Laterality Date    HX HEENT      tonsillectomy    HX LITHOTRIPSY  10/9/2010    Left upper 3rd ureteral calculus    HX ORTHOPAEDIC      Left shoulder     Allergies and Intolerances: Allergies   Allergen Reactions    Pcn [Penicillins] Not Reported This Time    Sulfa (Sulfonamide Antibiotics) Other (comments)     Family History:   No family history on file. Social History:   He  reports that he has never smoked.  He has never used smokeless tobacco.   Social History     Substance and Sexual Activity   Alcohol Use Yes    Alcohol/week: 2.0 standard drinks    Types: 2 Cans of beer per week    Comment: occasionally      Immunization History:  Immunization History   Administered Date(s) Administered    COVID-19, PFIZER PURPLE top, DILUTE for use, (age 15 y+), IM, 30mcg/0.3mL 09/30/2021, 11/16/2021         Follow up 6 months       Dr. Sb Burnham, AGNP-C, DNP  [facility] [address] [place] [address]

## 2022-09-12 NOTE — PROGRESS NOTES
9/12/22 [facility] nurses: Pls inform patient of the following lab results:  1.  TG 96 to 271. Goal is less than 150. Needs to cut back on oily foods such as barbecue sauce, salad dressing, avocados. Due to his history of coronary artery disease, will increase atorvastatin from 40 mg daily to 80 mg daily. (HE WILL NEED LABS 1 WEEK PRIOR TO 3/2/23 APPT TO RECHECK CHOLESTEROL LEVELS.)  2. . Reduce fried fatty foods in diet. Encourage weight loss. 3.  Thyroid levels normal    Please reach out to [facility] lab and inquire as to where his A1c results are.     Thank you

## 2022-09-12 NOTE — PROGRESS NOTES
Pls inform patient of the following lab results:  1.  TG 96 to 271. Goal is less than 150. Needs to cut back on oily foods such as barbecue sauce, salad dressing, avocados. Due to his history of coronary artery disease, will increase atorvastatin from 40 mg daily to 80 mg daily. (HE WILL NEED LABS 1 WEEK PRIOR TO 3/2/23 APPT TO RECHECK CHOLESTEROL LEVELS.)  2. . Reduce fried fatty foods in diet. Encourage weight loss. 3.  Thyroid levels normal    Please reach out to [facility] lab and inquire as to where his A1c results are.     Thank you

## 2022-09-12 NOTE — PROGRESS NOTES
No being out of statin med for a week will not cause this huge jump in number. Also he is a type 2 diabetic as his A1c is 6. 4. he needs to reduce carbs and sweets in diet along with weight loss. Will reassess level 1 week prior to his f/u appt.  Thank you

## 2022-09-12 NOTE — PROGRESS NOTES
Patient reached and made aware of message per Dr. Elly Anne. Patient states he has been seeing a dietician and including more monounsaturated fats in diet. Pt stated he will discuss this with his cardiologist who referred him. Patient added to lab schedule for 2/27/2023, labs not entered as of yet. Not sure if system will cross over by then.

## 2022-09-12 NOTE — PROGRESS NOTES
Patient reached and made aware of lab results per Dr. Kwesi Triana. Patient states he was out of his statin meds for atleast 7-10 days prior to having labs draw, inquiring if this  could be the reasoning to his elevated levels. A1c results also now available in chart. Please advise.

## 2022-09-12 NOTE — PROGRESS NOTES
9/12/2022 2:00pm  No being out of statin med for a week will not cause this huge jump in number. Also he is a type 2 diabetic as his A1c is 6. 4. he needs to reduce carbs and sweets in diet along with weight loss. Will reassess level 1 week prior to his f/u appt.  Thank you

## 2022-09-19 ENCOUNTER — OFFICE VISIT (OUTPATIENT)
Dept: CARDIOLOGY CLINIC | Age: 52
End: 2022-09-19
Payer: COMMERCIAL

## 2022-09-19 VITALS
SYSTOLIC BLOOD PRESSURE: 142 MMHG | OXYGEN SATURATION: 99 % | HEART RATE: 66 BPM | DIASTOLIC BLOOD PRESSURE: 92 MMHG | WEIGHT: 287 LBS | HEIGHT: 70 IN | BODY MASS INDEX: 41.09 KG/M2

## 2022-09-19 DIAGNOSIS — E78.2 MIXED HYPERLIPIDEMIA: ICD-10-CM

## 2022-09-19 DIAGNOSIS — I10 ESSENTIAL HYPERTENSION WITH GOAL BLOOD PRESSURE LESS THAN 140/90: ICD-10-CM

## 2022-09-19 DIAGNOSIS — I25.10 CORONARY ARTERY DISEASE INVOLVING NATIVE CORONARY ARTERY OF NATIVE HEART WITHOUT ANGINA PECTORIS: Primary | ICD-10-CM

## 2022-09-19 DIAGNOSIS — R07.9 CHEST PAIN, UNSPECIFIED TYPE: ICD-10-CM

## 2022-09-19 PROCEDURE — 93000 ELECTROCARDIOGRAM COMPLETE: CPT | Performed by: INTERNAL MEDICINE

## 2022-09-19 PROCEDURE — 99214 OFFICE O/P EST MOD 30 MIN: CPT | Performed by: INTERNAL MEDICINE

## 2022-09-19 RX ORDER — ISOSORBIDE MONONITRATE 60 MG/1
60 TABLET, EXTENDED RELEASE ORAL DAILY
Qty: 30 TABLET | Refills: 6 | Status: SHIPPED | OUTPATIENT
Start: 2022-09-19

## 2022-09-19 RX ORDER — CARVEDILOL 12.5 MG/1
12.5 TABLET ORAL 2 TIMES DAILY WITH MEALS
Qty: 90 TABLET | Refills: 6 | Status: SHIPPED | OUTPATIENT
Start: 2022-09-19 | End: 2022-09-27

## 2022-09-19 RX ORDER — ROSUVASTATIN CALCIUM 40 MG/1
40 TABLET, COATED ORAL
Qty: 30 TABLET | Refills: 6 | Status: SHIPPED | OUTPATIENT
Start: 2022-09-19

## 2022-09-19 NOTE — LETTER
9/19/2022    Patient: Singh Palomo   YOB: 1970   Date of Visit: 9/19/2022     Darby Gar, [address]  [address] [facility] [address] [place] [address] [address] [address]  [address] [place]    Dear Darby Gar DNP,      Thank you for referring Mr. Andrés Sprague to [facility] for evaluation. My notes for this consultation are attached. If you have questions, please do not hesitate to call me. I look forward to following your patient along with you.       Sincerely,    Nixon Nevarez MD

## 2022-09-19 NOTE — PROGRESS NOTES
Singh Palomo presents today for   Chief Complaint   Patient presents with    Follow-up     6 months        Singh Palomo preferred language for health care discussion is english/other. Is someone accompanying this pt? no    Is the patient using any DME equipment during [address]? no    Depression Screening:  3 most recent PHQ Screens 9/7/2022   Little interest or pleasure in doing things Not at all   Feeling down, depressed, irritable, or hopeless Not at all   Total Score PHQ 2 0   Trouble falling or staying asleep, or sleeping too much Not at all   Feeling tired or having little energy Not at all   Poor appetite, weight loss, or overeating Not at all   Feeling bad about yourself - or that you are a failure or have let yourself or your family down Not at all   Trouble concentrating on things such as school, work, reading, or watching TV Not at all   Moving or speaking so slowly that other people could have noticed; or the opposite being so fidgety that others notice Not at all   Thoughts of being better off dead, or hurting yourself in some way Not at all   PHQ 9 Score 0   How difficult have these problems made it for you to do your work, take care of your home and get along with others Not difficult at all       Learning Assessment:  Learning Assessment 1/16/2020   PRIMARY LEARNER Patient   HIGHEST LEVEL OF EDUCATION - PRIMARY LEARNER  > 4 YEARS [address] CAREGIVER No   PRIMARY LANGUAGE ENGLISH   LEARNER PREFERENCE PRIMARY DEMONSTRATION   ANSWERED BY patient   RELATIONSHIP SELF       Abuse Screening:  Abuse Screening Questionnaire 4/25/2022   Do you ever feel afraid of your partner? N   Are you in a relationship with someone who physically or mentally threatens you? N   Is it safe for you to go home? Y       Fall Risk  Fall Risk Assessment, last 12 mths 10/2/2020   Able to walk? Yes   Fall in past 12 months? No       Pt currently taking Anticoagulant therapy?  ASA 81mg every day     Coordination of Care:  1. Have you been to the ER, urgent care clinic since your last visit? Hospitalized since your last visit? no    2. Have you seen or consulted any other health care providers outside of the [address] [place] [address] since your last visit? Include any pap smears or colon screening.  no

## 2022-09-19 NOTE — PROGRESS NOTES
[facility]    Mr. Brett Leo is 45 y.o. male with a history of CAD: Hypertension, hyperlipidemia, obesity, sleep apnea    Patient is here today for follow-up appointment for his hypertension, hyperlipidemia and CAD. Patient has no cardiac symptoms to report. Denies any use of nitroglycerin. He denies any chest pain or chest tightness. He admits that he has gained some weight and has not been able to perform regular exercise. He has started performing exercise again. He was out of his cholesterol medicine for almost 10 days recently. At home usually runs 870-730 systolic and 99-21 diastolic. Past Medical History:   Diagnosis Date    Alcohol consumption of one to four drinks per day 8/31/2021    Anxiety 8/31/2021    Asthma     Bilateral lower extremity edema 8/31/2021    Calculus of kidney     Coronary artery disease involving native coronary artery of native heart without angina pectoris 8/9/2021    Elevated LFTs 2/24/2022    HLD (hyperlipidemia)     Hypertension     Hypokalemia 9/17/2014    Obesity (BMI 30-39.9) 2/5/2018    Paresthesias/numbness 6/13/2018    Prediabetes 2/24/2022    Primary hypertension 6/13/2018    S/P cardiac cath 11/2020    Borderline mid LAD and mid LCx ( iFR of both lesion, not significant)     Sleep apnea     not using CPAP    Venous insufficiency of both lower extremities 1/15/2018       Review of Systems:  Cardiac symptoms as noted above in HPI. All others negative. Denies fatigue, malaise, skin rash, joint pain, blurring vision, photophobia, neck pain, hemoptysis, chronic cough, nausea, vomiting, hematuria, burning micturition, BRBPR, chronic headaches. Current Outpatient Medications   Medication Sig    atorvastatin (LIPITOR) 80 mg tablet Take 1 Tablet by mouth daily. spironolactone (ALDACTONE) 25 mg tablet Take 1 Tablet by mouth daily.     LORazepam (ATIVAN) 0.5 mg tablet Take 1 Tablet by mouth nightly as needed for Anxiety. Max Daily Amount: 0.5 mg.    etodolac (LODINE) 400 mg tablet Take 1 Tablet by mouth two (2) times daily (with meals). isosorbide mononitrate ER (IMDUR) 30 mg tablet TAKE 1 TABLET BY MOUTH EVERY DAY IN THE MORNING    carvediloL (COREG) 12.5 mg tablet Take 1 Tablet by mouth two (2) times daily (with meals). aspirin delayed-release 81 mg tablet TAKE 1 TABLET BY MOUTH EVERY DAY    nitroglycerin (NITROSTAT) 0.4 mg SL tablet 1 Tab by SubLINGual route every five (5) minutes as needed for Chest Pain. Up to 3 doses. No current facility-administered medications for this visit. Past Surgical History:   Procedure Laterality Date    HX HEENT      tonsillectomy    HX LITHOTRIPSY  10/9/2010    Left upper 3rd ureteral calculus    HX ORTHOPAEDIC      Left shoulder       Allergies and Sensitivities:  Allergies   Allergen Reactions    Pcn [Penicillins] Not Reported This Time    Sulfa (Sulfonamide Antibiotics) Other (comments)       Family History:  No family history on file. Social History:  Social History     Tobacco Use    Smoking status: Never    Smokeless tobacco: Never   Vaping Use    Vaping Use: Never used   Substance Use Topics    Alcohol use: Yes     Alcohol/week: 2.0 standard drinks     Types: 2 Cans of beer per week     Comment: occasionally     Drug use: No     He  reports that he has never smoked. He has never used smokeless tobacco.  He  reports current alcohol use of about 2.0 standard drinks per week. Physical Exam:  BP Readings from Last 3 Encounters:   09/19/22 (!) 160/100   09/07/22 124/87   04/25/22 (!) 144/100         Pulse Readings from Last 3 Encounters:   09/19/22 66   09/07/22 74   07/25/22 72          Wt Readings from Last 3 Encounters:   09/19/22 130.2 kg (287 lb)   09/07/22 129.7 kg (286 lb)   07/25/22 127 kg (280 lb)       Constitutional: Oriented to person, place, and time. HENT: Head: Normocephalic and atraumatic  Neck: No JVD present.  Carotid bruit is not appreciated. Cardiovascular: Regular rhythm. No murmur, gallop or rubs appreciated  Lung: Breath sounds normal. No respiratory distress. No ronchi or rales appreciated  Abdominal: No tenderness. No rebound and no guarding. Musculoskeletal: There is trace + lower extremity edema. No cynosis    LABS:   Lab Results   Component Value Date/Time    Sodium 139 09/09/2022 07:51 AM    Potassium 3.5 09/09/2022 07:51 AM    Chloride 99 09/09/2022 07:51 AM    CO2 28 09/09/2022 07:51 AM    Glucose 135 (H) 09/09/2022 07:51 AM    BUN 17 09/09/2022 07:51 AM    Creatinine 1.0 09/09/2022 07:51 AM     Lipids Latest Ref Rng & Units 9/9/2022 8/24/2021 1/10/2020 12/12/2018   Chol, Total 110 - 200 mg/dL 196 142 224(H) 213(H)   HDL >=40 mg/dL 37(L) 41 38(L) 39(L)   LDL 50 - 99 mg/dL 105(H) 82 156(H) 141(H)   Trig 40 - 149 mg/dL 271(H) 96 148 167(H)   Some recent data might be hidden     Lab Results   Component Value Date/Time    ALT (SGPT) 60 (H) 09/09/2022 07:51 AM     Lab Results   Component Value Date/Time    Hemoglobin A1c 6.4 (H) 09/09/2022 07:51 AM     Lab Results   Component Value Date/Time    TSH 1.27 09/09/2022 07:51 AM       EKG  Sinus rhythm at 66 bpm.  No pathologic Q waves. No ST changes of ischemia. ECHO (09/20)  Left Ventricle  Normal cavity size and systolic function (ejection fraction normal). Moderately increased wall thickness. Wall motion: normal. The estimated EF is 55 - 60%. Visually measured ejection fraction. There is mild (grade 1) left ventricular diastolic dysfunction. Wall Scoring  The left ventricular wall motion is normal.             Left Atrium  Normal cavity size. Left Atrium volume index is 29 mL/m2. Right Ventricle  Normal global systolic function. Mildly dilated right ventricle. Right Atrium  Mildly dilated right atrium. Aortic Valve  Normal valve structure, trileaflet valve structure, no stenosis and no regurgitation. Mitral Valve  Normal valve structure and no stenosis.  Trace regurgitation. Tricuspid Valve  Normal valve structure and no stenosis. Trace regurgitation. Pulmonic Valve  Pulmonic valve normal doppler findings. Normal valve structure and no stenosis. Mild regurgitation. Aorta  Mildly dilated aortic root; diameter is 3.6 cm. Pulmonary Artery  Pulmonary arterial systolic pressure (PASP) is 29 mmHg. Pulmonary hypertension not suggested by Doppler findings. STRESS TEST (010/20)  Baseline ECG: Normal sinus rhythm. Inconclusive stress test.  Moderate risk Melendez treadmill score. And exercise for 7 minutes with heart rate achieved 120 bpm when patient started having 7 out of 10 chest pain with EKG changes. Post exercise nuclear images was not performed as he was not able to achieve target heart rate    CATHETERIZATION (11/20)  Left Main    Separate LAD and LCx ostia    Left Anterior Descending    Proximal LAD ectasia. Mild proximal disease. Mid LAD after D2 has eccentric borderline 60% narrowing. Otherwise LAD is normal IFR of mid LAD lesion was 0.93, suggesting physiologically not significant lesion D1: Ostial 60%, small caliber vessel D2: Ostial 85% stenosis, small-medium caliber vessel however supplying very small myocardial wall    Left Circumflex    Mid LCx borderline 50-60% tubular narrowing. IFR of mid LCx lesion was 0.97 suggesting physiologically not significant stenosis OM1: Mild proximal luminal irregularities otherwise normal    Right Coronary Artery    Large ectatic vessel No obstructive disease noted. Mild diffuse luminal irregularities      IMPRESSION & PLAN:  Mr. Viviana Saleem is 45 y.o. male with multiple medical problem    CAD:  Patient had a cardiac catheterization in November 2020 which showed two-vessel CAD, IFR was not physiologically significant  Since then patient has remained on medical management with almost resolution of his anginal symptoms  No use of nitroglycerin since last time.     Aspirin, beta-blocker, Imdur and statin. Hypertension:  160/1 100 mmHg. At home usually 059/235 systolic and 13-89 diastolic. Will increase carvedilol dose to 18.75 mg twice daily and increase Imdur to 60 mg daily. Salt restriction, weight loss and exercise program advised again    Hyperlipidemia:  Based on his cholesterol profile in January 2020, his 10-year risk of having ASCVD is 7.8%. LDL in 08/2021 was 82. This has increased again. I am going to switch atorvastatin to rosuvastatin 40 mg daily. Side effect discussed    Patient is going to start working on his diet and exercise plan again to help lose weight. This plan was discussed with patient who is in agreement. Thank you for allowing me to participate in patient care. Please feel free to call me if you have any question or concern. Joelle Hopper MD  Please note: This document has been produced using voice recognition software. Unrecognized errors in transcription may be present.

## 2022-10-01 DIAGNOSIS — M51.36 DEGENERATIVE DISC DISEASE, LUMBAR: ICD-10-CM

## 2022-10-01 DIAGNOSIS — G57.01 PIRIFORMIS SYNDROME, RIGHT: ICD-10-CM

## 2022-10-03 RX ORDER — ETODOLAC 400 MG/1
TABLET, FILM COATED ORAL
Qty: 60 TABLET | Refills: 1 | Status: SHIPPED | OUTPATIENT
Start: 2022-10-03

## 2022-10-18 RX ORDER — ASPIRIN 81 MG/1
TABLET ORAL
Qty: 30 TABLET | Refills: 6 | Status: SHIPPED | OUTPATIENT
Start: 2022-10-18

## 2022-11-22 RX ORDER — CARVEDILOL 12.5 MG/1
12.5 TABLET ORAL 2 TIMES DAILY WITH MEALS
Qty: 180 TABLET | Refills: 2 | Status: SHIPPED | OUTPATIENT
Start: 2022-11-22

## 2022-12-06 NOTE — PROGRESS NOTES
Sade Avitia presents today for a 3 month check-up. Since his last visit, he states that he has been doing well. He offers no cardiac complaints. He states that he has experienced random chest discomfort that occurs at anytime and is affected by movement and position changes. He states that it is not similar to what he experienced prior to undergoing cardiac catheterization. He is a 47year old male with history of CAD, hypertension, hyperlipidemia, obesity, sleep apnea. He had an exercise stress done on 10/22/21 which showed a Moderate risk Duke treadmill score. He had an echo also done on 10/22/21 and it showed an EF of 55-60%, normal wall motion, grade 1 diastolic dysfunction, and PASP of 29 mmHg. He underwent a cardiac catheterization in November 10, 2020 which showed:     Left Main   Separate LAD and LCx ostia   Left Anterior Descending   Proximal LAD ectasia. Mild proximal disease. Mid LAD after D2 has eccentric borderline 60% narrowing. Otherwise LAD is normal IFR of mid LAD lesion was 0.93, suggesting physiologically not significant lesion D1: Ostial 60%, small caliber vessel D2: Ostial 85% stenosis, small-medium caliber vessel however supplying very small myocardial wall   Left Circumflex   Mid LCx borderline 50-60% tubular narrowing. IFR of mid LCx lesion was 0.97 suggesting physiologically not significant stenosis OM1: Mild proximal luminal irregularities otherwise normal   Right Coronary Artery   Large ectatic vessel No obstructive disease noted. Mild diffuse luminal irregularities     His CAD is being medically managed. Denies chest pain, tightness, heaviness, and palpitations. Denies shortness of breath at rest, dyspnea on exertion, orthopnea and PND. Denies abdominal bloating. Denies lightheadedness, dizziness, and syncope. Denies lower extremity edema and claudication. Denies nausea, vomiting, diarrhea, melena, hematochezia. Denies hematuria, urgency, frequency.   Denies fever, chills. While reviewing his medications, it was noted that he did not increase his carvedilol to 18.75mg BID as recommended during his appointment with Dr. Meagan Stanton on 9/19/22. He is taking the increased dose of Imdur 60mg daily. PMH:  Past Medical History:   Diagnosis Date    Alcohol consumption of one to four drinks per day 8/31/2021    Anxiety 8/31/2021    Asthma     Bilateral lower extremity edema 8/31/2021    Calculus of kidney     Coronary artery disease involving native coronary artery of native heart without angina pectoris 8/9/2021    Elevated LFTs 2/24/2022    HLD (hyperlipidemia)     Hypertension     Hypokalemia 9/17/2014    Obesity (BMI 30-39.9) 2/5/2018    Paresthesias/numbness 6/13/2018    Prediabetes 2/24/2022    Primary hypertension 6/13/2018    S/P cardiac cath 11/2020    Borderline mid LAD and mid LCx ( iFR of both lesion, not significant)     Sleep apnea     not using CPAP    Venous insufficiency of both lower extremities 1/15/2018       PSH:  Past Surgical History:   Procedure Laterality Date    HX HEENT      tonsillectomy    HX LITHOTRIPSY  10/9/2010    Left upper 3rd ureteral calculus    HX ORTHOPAEDIC      Left shoulder       MEDS:  Current Outpatient Medications   Medication Sig    aspirin delayed-release 81 mg tablet Take 1 Tablet by mouth daily. carvediloL (COREG) 12.5 mg tablet Take 1 Tablet by mouth two (2) times daily (with meals). etodolac (LODINE) 400 mg tablet TAKE 1 TABLET BY MOUTH TWICE A DAY WITH MEALS    rosuvastatin (CRESTOR) 40 mg tablet Take 1 Tablet by mouth nightly. isosorbide mononitrate ER (IMDUR) 60 mg CR tablet Take 1 Tablet by mouth daily. spironolactone (ALDACTONE) 25 mg tablet Take 1 Tablet by mouth daily. LORazepam (ATIVAN) 0.5 mg tablet Take 1 Tablet by mouth nightly as needed for Anxiety.  Max Daily Amount: 0.5 mg.    nitroglycerin (NITROSTAT) 0.4 mg SL tablet 1 Tab by SubLINGual route every five (5) minutes as needed for Chest Pain. Up to 3 doses. No current facility-administered medications for this visit. Allergies and Sensitivities:  Allergies   Allergen Reactions    Pcn [Penicillins] Not Reported This Time    Sulfa (Sulfonamide Antibiotics) Other (comments)       Family History:  No family history on file. Social History:  He  reports that he has never smoked. He has never used smokeless tobacco.  He  reports current alcohol use of about 2.0 standard drinks per week. Physical:  Visit Vitals  /88 (BP 1 Location: Left upper arm, BP Patient Position: Sitting, BP Cuff Size: Adult)   Pulse 80   Ht 5' 10\" (1.778 m)   Wt 130.6 kg (288 lb)   SpO2 96%   BMI 41.32 kg/m²       Exam:  Neck:  Supple, no JVD, no carotid bruits  CV:  Normal S1 and  S2, no murmurs, rubs, or gallops noted  Lungs:  Clear to ausculation throughout, no wheezes or rales  Abd:  Soft, non-tender, obese with good bowel sounds. No hepatosplenomegaly  Extremities:  No edema      Data:  EKG:  Not done today      LABS:  Lab Results   Component Value Date/Time    Sodium 139 09/09/2022 07:51 AM    Potassium 3.5 09/09/2022 07:51 AM    Chloride 99 09/09/2022 07:51 AM    CO2 28 09/09/2022 07:51 AM    Glucose 135 (H) 09/09/2022 07:51 AM    BUN 17 09/09/2022 07:51 AM    Creatinine 1.0 09/09/2022 07:51 AM     Lab Results   Component Value Date/Time    Cholesterol, total 196 09/09/2022 07:51 AM    HDL Cholesterol 37 (L) 09/09/2022 07:51 AM    LDL, calculated 105 (H) 09/09/2022 07:51 AM    Triglyceride 271 (H) 09/09/2022 07:51 AM     Lab Results   Component Value Date/Time    ALT (SGPT) 60 (H) 09/09/2022 07:51 AM         Impression/Plan:  1.  CAD, 2 vessel disease, being medically managed  2. Hypertension, blood pressure elevated and suboptimally controlled  3. Hyperlipidemia, on rosuvastatin 40mg  4. Sleep apnea  5. Obesity    Mr. Van Hopkins was seen today for a 3 month check-up. He states that he has been feeling well and offers no cardiac complaints.   He has had some random chest discomfort that occurs at anytime and is affected by movement and position changes. He feels that it is musculoskeletal in nature and he was reassured. He can elicit the pain by pressing on his chest as well. His blood pressure is still slightly elevated and states that at home, at times it is more elevated. He was instructed to increase the carvedilol to 18.75mg BID as previously recommended. He will take 1 and 1/2 of his 12.5mg carvedilol tablets twice a day. All other medications to remain the same. He will return in about 4-6 weeks for another blood pressure evaluation. He states that he is trying to lose weight but it has been difficult. He was instructed to restart his ASA 81mg daily as he states that he has not taken it for about 2 months as he was having difficulty getting his prescription refilled. He was reminded that this is an OTC medication and that he can take the OTC version. He asked for a prescription for the ASA and this was sent to his pharmacy of choice. He will follow-up with Dr. Rissa Hernandez as scheduled and as needed. Time:  30 minutes      Merry Lanes MSN, FNP-BC    Please note:  Portions of this chart were created with Dragon medical speech to text program.  Unrecognized errors may be present.

## 2022-12-14 ENCOUNTER — OFFICE VISIT (OUTPATIENT)
Dept: CARDIOLOGY CLINIC | Age: 52
End: 2022-12-14
Payer: COMMERCIAL

## 2022-12-14 VITALS
DIASTOLIC BLOOD PRESSURE: 88 MMHG | WEIGHT: 288 LBS | HEART RATE: 80 BPM | BODY MASS INDEX: 41.23 KG/M2 | OXYGEN SATURATION: 96 % | SYSTOLIC BLOOD PRESSURE: 138 MMHG | HEIGHT: 70 IN

## 2022-12-14 DIAGNOSIS — E78.2 MIXED HYPERLIPIDEMIA: ICD-10-CM

## 2022-12-14 DIAGNOSIS — I25.10 CORONARY ARTERY DISEASE INVOLVING NATIVE CORONARY ARTERY OF NATIVE HEART WITHOUT ANGINA PECTORIS: Primary | ICD-10-CM

## 2022-12-14 PROCEDURE — 99214 OFFICE O/P EST MOD 30 MIN: CPT | Performed by: NURSE PRACTITIONER

## 2022-12-14 PROCEDURE — 3074F SYST BP LT 130 MM HG: CPT | Performed by: NURSE PRACTITIONER

## 2022-12-14 PROCEDURE — 3078F DIAST BP <80 MM HG: CPT | Performed by: NURSE PRACTITIONER

## 2022-12-14 RX ORDER — ASPIRIN 81 MG/1
81 TABLET ORAL DAILY
Qty: 90 TABLET | Refills: 3 | Status: SHIPPED | OUTPATIENT
Start: 2022-12-14

## 2022-12-14 NOTE — PROGRESS NOTES
Jina Muller presents today for   Chief Complaint   Patient presents with    Follow-up     3 month       Jina Muller preferred language for health care discussion is english/other. Is someone accompanying this pt? no    Is the patient using any DME equipment during [address]? no    Depression Screening:  3 most recent PHQ Screens 12/14/2022   Little interest or pleasure in doing things Not at all   Feeling down, depressed, irritable, or hopeless Not at all   Total Score PHQ 2 0   Trouble falling or staying asleep, or sleeping too much -   Feeling tired or having little energy -   Poor appetite, weight loss, or overeating -   Feeling bad about yourself - or that you are a failure or have let yourself or your family down -   Trouble concentrating on things such as school, work, reading, or watching TV -   Moving or speaking so slowly that other people could have noticed; or the opposite being so fidgety that others notice -   Thoughts of being better off dead, or hurting yourself in some way -   PHQ 9 Score -   How difficult have these problems made it for you to do your work, take care of your home and get along with others -       Learning Assessment:  Learning Assessment 12/14/2022   PRIMARY LEARNER Patient   HIGHEST LEVEL OF EDUCATION - PRIMARY LEARNER  -   BARRIERS PRIMARY LEARNER -   CO-LEARNER CAREGIVER -   PRIMARY LANGUAGE ENGLISH   LEARNER PREFERENCE PRIMARY DEMONSTRATION   ANSWERED BY patient   RELATIONSHIP SELF       Abuse Screening:  Abuse Screening Questionnaire 12/14/2022   Do you ever feel afraid of your partner? N   Are you in a relationship with someone who physically or mentally threatens you? N   Is it safe for you to go home? Y       Fall Risk  Fall Risk Assessment, last 12 mths 10/2/2020   Able to walk? Yes   Fall in past 12 months? No           Pt currently taking Anticoagulant therapy? no    Pt currently taking Antiplatelet therapy ? Aspirin 81 mg daily      Coordination of Care:  1. Have you been to the ER, urgent care clinic since your last visit? Hospitalized since your last visit? no    2. Have you seen or consulted any other health care providers outside of the [address] [place] [address] since your last visit? Include any pap smears or colon screening.  no

## 2022-12-14 NOTE — PATIENT INSTRUCTIONS
Increase carvedilol to 18.75mg twice a day.   Take 1 and 1/2 of your 12.5mg tablets twice a day  All other medications to remain the same  Restart Aspirin 81mg once a day  Follow-up with Carlos Lopez in 4-6 weeks  Follow-up with Dr. Gama Mccoy as scheduled and as needed

## 2023-01-22 NOTE — PROGRESS NOTES
Joan Coburn presents today for a 1 month follow-up of hypertension. When I saw him on 12/14/22, his carvedilol was increased to 18.75mg BID as his blood pressure was elevated and suboptimally controlled. He was supposed to increase his carvedilol to 18.75mg when he last saw Dr. Cristobal Madrigal but this was not done. Since his last visit, he states that he is feeling better. He has made some dietary changes over the past 2 weeks (decreased carbohydrate intake and intermittent fasting) and he states that he has noticed a difference in how he feels. His blood pressure since increasing the carvedilol has improved as he is monitoring his blood pressures at home and keeping a diary. He states that his blood pressure has been in the 120's and 608'Q (systolic) over 55'D and 38'J (diastolic). He is a 47year old male with history of CAD, hypertension, hyperlipidemia, obesity, sleep apnea. He had an exercise stress done on 10/22/21 which showed a Moderate risk Duke treadmill score. He had an echo also done on 10/22/21 and it showed an EF of 55-60%, normal wall motion, grade 1 diastolic dysfunction, and PASP of 29 mmHg. He underwent a cardiac catheterization in November 10, 2020 which showed:       Left Main   Separate LAD and LCx ostia   Left Anterior Descending   Proximal LAD ectasia. Mild proximal disease. Mid LAD after D2 has eccentric borderline 60% narrowing. Otherwise LAD is normal IFR of mid LAD lesion was 0.93, suggesting physiologically not significant lesion D1: Ostial 60%, small caliber vessel D2: Ostial 85% stenosis, small-medium caliber vessel however supplying very small myocardial wall   Left Circumflex   Mid LCx borderline 50-60% tubular narrowing. IFR of mid LCx lesion was 0.97 suggesting physiologically not significant stenosis OM1: Mild proximal luminal irregularities otherwise normal   Right Coronary Artery   Large ectatic vessel No obstructive disease noted.  Mild diffuse luminal irregularities     His CAD is being medically managed. Denies chest pain, tightness, heaviness, and palpitations. Denies shortness of breath at rest, dyspnea on exertion, orthopnea and PND. Denies abdominal bloating. Denies lightheadedness, dizziness, and syncope. Denies lower extremity edema and claudication. Denies nausea, vomiting, diarrhea, melena, hematochezia. Denies hematuria, urgency, frequency. Denies fever, chills. While reviewing his medications, it was noted that he did not increase his carvedilol to 18.75mg BID as recommended during his appointment with Dr. Joelle Hopper on 9/19/22. He is taking the increased dose of Imdur 60mg daily. PMH:  Past Medical History:   Diagnosis Date    Alcohol consumption of one to four drinks per day 8/31/2021    Anxiety 8/31/2021    Asthma     Bilateral lower extremity edema 8/31/2021    Calculus of kidney     Coronary artery disease involving native coronary artery of native heart without angina pectoris 8/9/2021    Elevated LFTs 2/24/2022    HLD (hyperlipidemia)     Hypertension     Hypokalemia 9/17/2014    Obesity (BMI 30-39.9) 2/5/2018    Paresthesias/numbness 6/13/2018    Prediabetes 2/24/2022    Primary hypertension 6/13/2018    S/P cardiac cath 11/2020    Borderline mid LAD and mid LCx ( iFR of both lesion, not significant)     Sleep apnea     not using CPAP    Venous insufficiency of both lower extremities 1/15/2018       PSH:  Past Surgical History:   Procedure Laterality Date    HX HEENT      tonsillectomy    HX LITHOTRIPSY  10/9/2010    Left upper 3rd ureteral calculus    HX ORTHOPAEDIC      Left shoulder       MEDS:  Current Outpatient Medications   Medication Sig    carvediloL (COREG) 12.5 mg tablet Take 1.5 Tablets by mouth two (2) times daily (with meals). aspirin delayed-release 81 mg tablet Take 1 Tablet by mouth daily. isosorbide mononitrate ER (IMDUR) 60 mg CR tablet Take 1 Tablet by mouth daily.     spironolactone (ALDACTONE) 25 mg tablet Take 1 Tablet by mouth daily. LORazepam (ATIVAN) 0.5 mg tablet Take 1 Tablet by mouth nightly as needed for Anxiety. Max Daily Amount: 0.5 mg.    nitroglycerin (NITROSTAT) 0.4 mg SL tablet 1 Tab by SubLINGual route every five (5) minutes as needed for Chest Pain. Up to 3 doses. atorvastatin (LIPITOR) 80 mg tablet Take 80 mg by mouth daily. No current facility-administered medications for this visit. Allergies and Sensitivities:  Allergies   Allergen Reactions    Pcn [Penicillins] Not Reported This Time    Sulfa (Sulfonamide Antibiotics) Other (comments)       Family History:  No family history on file. Social History:  He  reports that he has never smoked. He has never used smokeless tobacco.  He  reports current alcohol use of about 2.0 standard drinks per week. Physical:  Visit Vitals  BP (!) 120/90 (BP 1 Location: Right upper arm, BP Patient Position: Sitting, BP Cuff Size: Large adult)   Pulse 71   Ht 5' 10\" (1.778 m)   Wt 128.8 kg (284 lb)   SpO2 96%   BMI 40.75 kg/m²     His weight is down 4 pounds    Exam:  Neck:  Supple, no JVD, no carotid bruits  CV:  Normal S1 and  S2, no murmurs, rubs, or gallops noted  Lungs:  Clear to ausculation throughout, no wheezes or rales  Abd:  Soft, non-tender, obese with good bowel sounds.   No hepatosplenomegaly  Extremities:  No edema      Data:  EKG:  Not done today      LABS:  Lab Results   Component Value Date/Time    Sodium 139 09/09/2022 07:51 AM    Potassium 3.5 09/09/2022 07:51 AM    Chloride 99 09/09/2022 07:51 AM    CO2 28 09/09/2022 07:51 AM    Glucose 135 (H) 09/09/2022 07:51 AM    BUN 17 09/09/2022 07:51 AM    Creatinine 1.0 09/09/2022 07:51 AM     Lab Results   Component Value Date/Time    Cholesterol, total 196 09/09/2022 07:51 AM    HDL Cholesterol 37 (L) 09/09/2022 07:51 AM    LDL, calculated 105 (H) 09/09/2022 07:51 AM    Triglyceride 271 (H) 09/09/2022 07:51 AM     Lab Results   Component Value Date/Time    ALT (SGPT) 60 (H) 09/09/2022 07:51 AM         Impression/Plan:  1.  CAD, 2 vessel disease, being medically managed  2. Hypertension, blood pressure slightly elevated but much better controlled at home (per blood pressure diary)  3. Hyperlipidemia, on atorvastatin 80mg  4. Sleep apnea  5. Obesity    Mr. Brett Leo was seen today for a 1 month follow-up of his blood pressure after he was instructed to increase his carvedilol to 18.75mg BID (as previously instructed by Dr. Namrata Leo). He tolerated the increase well. His blood pressure is slightly elevated today but he reports that at home, his blood pressures are much improved. He states that his blood pressure has been in the 120's and 946'B (systolic) over 13'C and 95'K (diastolic). No changes were made today and he was instructed to continue his present medication regimen. He is feeling much better and he recently has made dietary changes (low carbohydrate and intermittent fasting). He states that his goal is to lose 50 pounds by June or July. Positive reinforcement given for his weight loss efforts. Time: 25 minutes    He will follow-up with Dr. Namrata Leo as scheduled and as needed. Jaylin Tavarez MSN, FNP-BC    Please note:  Portions of this chart were created with Dragon medical speech to text program.  Unrecognized errors may be present.

## 2023-01-26 ENCOUNTER — OFFICE VISIT (OUTPATIENT)
Dept: CARDIOLOGY CLINIC | Age: 53
End: 2023-01-26
Payer: COMMERCIAL

## 2023-01-26 VITALS
WEIGHT: 284 LBS | DIASTOLIC BLOOD PRESSURE: 90 MMHG | HEART RATE: 71 BPM | HEIGHT: 70 IN | OXYGEN SATURATION: 96 % | BODY MASS INDEX: 40.66 KG/M2 | SYSTOLIC BLOOD PRESSURE: 120 MMHG

## 2023-01-26 DIAGNOSIS — E78.2 MIXED HYPERLIPIDEMIA: ICD-10-CM

## 2023-01-26 DIAGNOSIS — I10 ESSENTIAL HYPERTENSION WITH GOAL BLOOD PRESSURE LESS THAN 140/90: Primary | ICD-10-CM

## 2023-01-26 DIAGNOSIS — I25.10 CORONARY ARTERY DISEASE INVOLVING NATIVE CORONARY ARTERY OF NATIVE HEART WITHOUT ANGINA PECTORIS: ICD-10-CM

## 2023-01-26 PROCEDURE — 99213 OFFICE O/P EST LOW 20 MIN: CPT | Performed by: NURSE PRACTITIONER

## 2023-01-26 PROCEDURE — 3080F DIAST BP >= 90 MM HG: CPT | Performed by: NURSE PRACTITIONER

## 2023-01-26 PROCEDURE — 3074F SYST BP LT 130 MM HG: CPT | Performed by: NURSE PRACTITIONER

## 2023-01-26 RX ORDER — ATORVASTATIN CALCIUM 80 MG/1
80 TABLET, FILM COATED ORAL DAILY
COMMUNITY
Start: 2022-12-21

## 2023-01-26 RX ORDER — CARVEDILOL 12.5 MG/1
18.75 TABLET ORAL 2 TIMES DAILY WITH MEALS
Qty: 1 TABLET | Refills: 0
Start: 2023-01-26 | End: 2023-01-26 | Stop reason: SDUPTHER

## 2023-01-26 RX ORDER — CARVEDILOL 12.5 MG/1
18.75 TABLET ORAL 2 TIMES DAILY WITH MEALS
Qty: 270 TABLET | Refills: 2 | Status: SHIPPED | OUTPATIENT
Start: 2023-01-26

## 2023-01-26 NOTE — PATIENT INSTRUCTIONS
Continue present medication regimen  Continue monitoring your blood pressures at home and record  Follow-up with Dr. Roselyn Fitch as scheduled and as needed  [id number] Health system phone number is [phone]

## 2023-01-26 NOTE — PROGRESS NOTES
Nan Strong presents today for   Chief Complaint   Patient presents with    Follow-up     1 month follow up. Nan Strong preferred language for health care discussion is english/other. Is someone accompanying this pt? no    Is the patient using any DME equipment during [address]? no    Depression Screening:  3 most recent PHQ Screens 1/26/2023   Little interest or pleasure in doing things Not at all   Feeling down, depressed, irritable, or hopeless Not at all   Total Score PHQ 2 0   Trouble falling or staying asleep, or sleeping too much -   Feeling tired or having little energy -   Poor appetite, weight loss, or overeating -   Feeling bad about yourself - or that you are a failure or have let yourself or your family down -   Trouble concentrating on things such as school, work, reading, or watching TV -   Moving or speaking so slowly that other people could have noticed; or the opposite being so fidgety that others notice -   Thoughts of being better off dead, or hurting yourself in some way -   PHQ 9 Score -   How difficult have these problems made it for you to do your work, take care of your home and get along with others -       Learning Assessment:  Learning Assessment 1/26/2023   PRIMARY LEARNER Patient   HIGHEST LEVEL OF EDUCATION - PRIMARY LEARNER  -   BARRIERS PRIMARY LEARNER -   CO-LEARNER CAREGIVER -   PRIMARY LANGUAGE ENGLISH   LEARNER PREFERENCE PRIMARY DEMONSTRATION   ANSWERED BY patient   RELATIONSHIP SELF       Abuse Screening:  Abuse Screening Questionnaire 1/26/2023   Do you ever feel afraid of your partner? N   Are you in a relationship with someone who physically or mentally threatens you? N   Is it safe for you to go home? Y       Fall Risk  Fall Risk Assessment, last 12 mths 10/2/2020   Able to walk? Yes   Fall in past 12 months? No           Pt currently taking Anticoagulant therapy? no    Pt currently taking Antiplatelet therapy ?  ASA 81 mg 1x daily       Coordination of Care:  1. Have you been to the ER, urgent care clinic since your last visit? Hospitalized since your last visit? no    2. Have you seen or consulted any other health care providers outside of the [address] [place] [address] since your last visit? Include any pap smears or colon screening.  no

## 2023-02-27 ENCOUNTER — NURSE ONLY (OUTPATIENT)
Age: 53
End: 2023-02-27

## 2023-02-27 DIAGNOSIS — E11.65 TYPE 2 DIABETES MELLITUS WITH HYPERGLYCEMIA, WITHOUT LONG-TERM CURRENT USE OF INSULIN (HCC): ICD-10-CM

## 2023-02-27 DIAGNOSIS — I10 ESSENTIAL HYPERTENSION: Primary | ICD-10-CM

## 2023-02-27 DIAGNOSIS — I10 ESSENTIAL HYPERTENSION: ICD-10-CM

## 2023-02-27 NOTE — PROGRESS NOTES
Diagnosis Orders   1. Essential hypertension  Comprehensive Metabolic Panel      2.  Type 2 diabetes mellitus with hyperglycemia, without long-term current use of insulin (HCC)  Hemoglobin A1C

## 2023-02-28 LAB
A/G RATIO: 3.1 RATIO (ref 1.1–2.6)
ALBUMIN SERPL-MCNC: 4.7 G/DL (ref 3.5–5)
ALP BLD-CCNC: 50 U/L (ref 25–115)
ALT SERPL-CCNC: 58 U/L (ref 5–40)
ANION GAP SERPL CALCULATED.3IONS-SCNC: 9 MMOL/L (ref 3–15)
AST SERPL-CCNC: 32 U/L (ref 10–37)
AVERAGE GLUCOSE: 126 MG/DL (ref 91–123)
BILIRUB SERPL-MCNC: 0.9 MG/DL (ref 0.2–1.2)
BUN BLDV-MCNC: 13 MG/DL (ref 6–22)
CALCIUM SERPL-MCNC: 9.4 MG/DL (ref 8.4–10.5)
CHLORIDE BLD-SCNC: 102 MMOL/L (ref 98–110)
CO2: 29 MMOL/L (ref 20–32)
CREAT SERPL-MCNC: 1 MG/DL (ref 0.5–1.2)
GLOBULIN: 1.5 G/DL (ref 2–4)
GLOMERULAR FILTRATION RATE: >60 ML/MIN/1.73 SQ.M.
GLUCOSE: 124 MG/DL (ref 70–99)
HBA1C MFR BLD: 6 % (ref 4.8–5.6)
POTASSIUM SERPL-SCNC: 4.3 MMOL/L (ref 3.5–5.5)
SODIUM BLD-SCNC: 140 MMOL/L (ref 133–145)
TOTAL PROTEIN: 6.2 G/DL (ref 6.4–8.3)

## 2023-03-01 DIAGNOSIS — I10 ESSENTIAL (PRIMARY) HYPERTENSION: ICD-10-CM

## 2023-03-01 DIAGNOSIS — E87.6 HYPOKALEMIA: ICD-10-CM

## 2023-03-02 ENCOUNTER — OFFICE VISIT (OUTPATIENT)
Age: 53
End: 2023-03-02
Payer: COMMERCIAL

## 2023-03-02 VITALS
DIASTOLIC BLOOD PRESSURE: 79 MMHG | TEMPERATURE: 97.6 F | SYSTOLIC BLOOD PRESSURE: 114 MMHG | WEIGHT: 280 LBS | BODY MASS INDEX: 40.09 KG/M2 | HEIGHT: 70 IN | RESPIRATION RATE: 18 BRPM | HEART RATE: 63 BPM | OXYGEN SATURATION: 98 %

## 2023-03-02 DIAGNOSIS — R79.89 ELEVATED LFTS: ICD-10-CM

## 2023-03-02 DIAGNOSIS — I10 ESSENTIAL HYPERTENSION: ICD-10-CM

## 2023-03-02 DIAGNOSIS — F41.9 ANXIETY: ICD-10-CM

## 2023-03-02 DIAGNOSIS — E11.65 TYPE 2 DIABETES MELLITUS WITH HYPERGLYCEMIA, WITHOUT LONG-TERM CURRENT USE OF INSULIN (HCC): Primary | ICD-10-CM

## 2023-03-02 DIAGNOSIS — E78.2 MIXED HYPERLIPIDEMIA: ICD-10-CM

## 2023-03-02 PROBLEM — R73.03 PREDIABETES: Status: RESOLVED | Noted: 2022-02-24 | Resolved: 2023-03-02

## 2023-03-02 PROCEDURE — 3078F DIAST BP <80 MM HG: CPT | Performed by: NURSE PRACTITIONER

## 2023-03-02 PROCEDURE — 3044F HG A1C LEVEL LT 7.0%: CPT | Performed by: NURSE PRACTITIONER

## 2023-03-02 PROCEDURE — 99213 OFFICE O/P EST LOW 20 MIN: CPT | Performed by: NURSE PRACTITIONER

## 2023-03-02 PROCEDURE — 3074F SYST BP LT 130 MM HG: CPT | Performed by: NURSE PRACTITIONER

## 2023-03-02 RX ORDER — ATORVASTATIN CALCIUM 80 MG/1
80 TABLET, FILM COATED ORAL DAILY
Qty: 90 TABLET | Refills: 0
Start: 2023-03-02

## 2023-03-02 RX ORDER — SPIRONOLACTONE 25 MG/1
TABLET ORAL
Qty: 90 TABLET | Refills: 1 | OUTPATIENT
Start: 2023-03-02

## 2023-03-02 RX ORDER — CARVEDILOL 12.5 MG/1
TABLET ORAL
Qty: 60 TABLET | Refills: 3
Start: 2023-03-02

## 2023-03-02 RX ORDER — SPIRONOLACTONE 25 MG/1
25 TABLET ORAL DAILY
Qty: 90 TABLET | Refills: 1 | Status: SHIPPED | OUTPATIENT
Start: 2023-03-02

## 2023-03-02 SDOH — ECONOMIC STABILITY: FOOD INSECURITY: WITHIN THE PAST 12 MONTHS, YOU WORRIED THAT YOUR FOOD WOULD RUN OUT BEFORE YOU GOT MONEY TO BUY MORE.: NEVER TRUE

## 2023-03-02 SDOH — ECONOMIC STABILITY: FOOD INSECURITY: WITHIN THE PAST 12 MONTHS, THE FOOD YOU BOUGHT JUST DIDN'T LAST AND YOU DIDN'T HAVE MONEY TO GET MORE.: NEVER TRUE

## 2023-03-02 SDOH — ECONOMIC STABILITY: HOUSING INSECURITY
IN THE LAST 12 MONTHS, WAS THERE A TIME WHEN YOU DID NOT HAVE A STEADY PLACE TO SLEEP OR SLEPT IN A SHELTER (INCLUDING NOW)?: NO

## 2023-03-02 SDOH — ECONOMIC STABILITY: INCOME INSECURITY: HOW HARD IS IT FOR YOU TO PAY FOR THE VERY BASICS LIKE FOOD, HOUSING, MEDICAL CARE, AND HEATING?: NOT HARD AT ALL

## 2023-03-02 ASSESSMENT — PATIENT HEALTH QUESTIONNAIRE - PHQ9
4. FEELING TIRED OR HAVING LITTLE ENERGY: 0
SUM OF ALL RESPONSES TO PHQ QUESTIONS 1-9: 0
7. TROUBLE CONCENTRATING ON THINGS, SUCH AS READING THE NEWSPAPER OR WATCHING TELEVISION: 0
3. TROUBLE FALLING OR STAYING ASLEEP: 0
SUM OF ALL RESPONSES TO PHQ QUESTIONS 1-9: 0
6. FEELING BAD ABOUT YOURSELF - OR THAT YOU ARE A FAILURE OR HAVE LET YOURSELF OR YOUR FAMILY DOWN: 0
1. LITTLE INTEREST OR PLEASURE IN DOING THINGS: 0
5. POOR APPETITE OR OVEREATING: 0
2. FEELING DOWN, DEPRESSED OR HOPELESS: 0
SUM OF ALL RESPONSES TO PHQ QUESTIONS 1-9: 0
8. MOVING OR SPEAKING SO SLOWLY THAT OTHER PEOPLE COULD HAVE NOTICED. OR THE OPPOSITE, BEING SO FIGETY OR RESTLESS THAT YOU HAVE BEEN MOVING AROUND A LOT MORE THAN USUAL: 0
10. IF YOU CHECKED OFF ANY PROBLEMS, HOW DIFFICULT HAVE THESE PROBLEMS MADE IT FOR YOU TO DO YOUR WORK, TAKE CARE OF THINGS AT HOME, OR GET ALONG WITH OTHER PEOPLE: 0
SUM OF ALL RESPONSES TO PHQ QUESTIONS 1-9: 0
9. THOUGHTS THAT YOU WOULD BE BETTER OFF DEAD, OR OF HURTING YOURSELF: 0
SUM OF ALL RESPONSES TO PHQ9 QUESTIONS 1 & 2: 0

## 2023-03-02 NOTE — PROGRESS NOTES
[facility]ists of [address] [facility] [place], [address]  [phone] VKUPYT/[phone] fax    3/2/2023    Don Nunn 1970 is a pleasant White (non-) male. Todays concern/HPI:  Anxiety. Use when traveling will take ativan. No refills needed at this time. Weight. Reduce carbs, caloric 2500 daily. Fasting Mon-Fri 8p-12 noon. X6 weeks. Walks a lot with work. Some strength training. Reduce alcohol consumption. Past Medical History:   Diagnosis Date    Alcohol consumption of one to four drinks per day 8/31/2021    Anxiety 8/31/2021    Asthma     Bilateral lower extremity edema 8/31/2021    Calculus of kidney     Coronary artery disease involving native coronary artery of native heart without angina pectoris 8/9/2021    Elevated LFTs 2/24/2022    HLD (hyperlipidemia)     Hypertension     Hypokalemia 9/17/2014    Obesity (BMI 30-39.9) 2/5/2018    Paresthesias/numbness 6/13/2018    Prediabetes 2/24/2022    Primary hypertension 6/13/2018    S/P cardiac cath 11/2020    Borderline mid LAD and mid LCx ( iFR of both lesion, not significant)     Sleep apnea     not using CPAP    Venous insufficiency of both lower extremities 1/15/2018     Current Outpatient Medications   Medication Sig    atorvastatin (LIPITOR) 80 MG tablet Take 1 tablet by mouth daily Dr More Key prescribes    carvedilol (COREG) 12.5 MG tablet 1.5 tab BID Dr More Key prescribes    spironolactone (ALDACTONE) 25 MG tablet Take 1 tablet by mouth daily    aspirin 81 MG EC tablet Take 81 mg by mouth daily    isosorbide mononitrate (IMDUR) 60 MG extended release tablet Take 60 mg by mouth daily    LORazepam (ATIVAN) 0.5 MG tablet Take 0.5 mg by mouth. nitroGLYCERIN (NITROSTAT) 0.4 MG SL tablet Place 0.4 mg under the tongue    etodolac (LODINE) 400 MG tablet TAKE 1 TABLET BY MOUTH TWICE A DAY WITH MEALS     No current facility-administered medications for this visit.        Physical:   Vitals: 03/02/23 1004   BP: 114/79   Pulse: 63   Resp: 18   Temp: 97.6 °F (36.4 °C)   TempSrc: Temporal   SpO2: 98%   Weight: 280 lb (127 kg)   Height: 5' 10\" (1.778 m)          Exam:   Physical Exam  Constitutional:       Appearance: Normal appearance. He is obese. Neck:      Vascular: No carotid bruit. Cardiovascular:      Rate and Rhythm: Normal rate and regular rhythm. Pulmonary:      Effort: Pulmonary effort is normal.      Breath sounds: Normal breath sounds. Musculoskeletal:         General: Normal range of motion. Skin:     General: Skin is warm. Neurological:      Mental Status: He is alert and oriented to person, place, and time. Psychiatric:         Mood and Affect: Mood normal.       Body mass index is 40.18 kg/m². Review of Data:  A1c 6.4-6.1  ALT 58 (chronic)    Plan:    1. Type 2 diabetes mellitus with hyperglycemia, without long-term current use of insulin (HCC)  Assessment & Plan:  A1c 6.4-6.1  Cont to work on diet and exercise  2. Essential hypertension  Assessment & Plan:   Well-controlled, continue current medications   Dr Roc Hendrix  Refilled aldactone  Orders:  -     carvedilol (COREG) 12.5 MG tablet; 1.5 tab BID Dr Roc Hendrix prescribes, Disp-60 tablet, R-3NO PRINT  -     spironolactone (ALDACTONE) 25 MG tablet; Take 1 tablet by mouth daily, Disp-90 tablet, R-1Normal  3. Mixed hyperlipidemia  Assessment & Plan:  No chg in therapy  Updated dose per patient  Orders:  -     atorvastatin (LIPITOR) 80 MG tablet; Take 1 tablet by mouth daily Dr Roc Hendrix prescribes, Disp-90 tablet, R-0NO PRINT  4. Elevated LFTs  Assessment & Plan:  Mildly elevated alk phos 58-improving  Continue to reduce alcohol consumption  5. Anxiety  Assessment & Plan:   Well-controlled, continue current medications   Cont Ativan as needed, mostly when traveling  No refills needed today  UDS and Sub agreement 6m      Reviewed medication and completed medication reconciliation with the patient.  Reviewed side effects of medications with the patient. Questions were answered and patient verb understanding. Past Surgical History:   Procedure Laterality Date    HEENT      tonsillectomy    LITHOTRIPSY  10/9/2010    Left upper 3rd ureteral calculus    ORTHOPEDIC SURGERY      Left shoulder     Allergies and Intolerances: Allergies   Allergen Reactions    Penicillins      Other reaction(s): Not Reported This Time    Sulfa Antibiotics Other (See Comments)     Family History:   No family history on file. Social History:   He  reports that he has never smoked. He has never used smokeless tobacco.   Social History     Substance and Sexual Activity   Alcohol Use Yes    Alcohol/week: 2.0 standard drinks     Immunization History: There is no immunization history on file for this patient. Return in about 6 months (around 9/2/2023) for Physical, Lab fasting (CMP, Lipid, A1c, microalbumin, UDS).         Dr. Pernell Singh, CHRISTIP-C, DNP  [facility]ists of [address] [place] [address]

## 2023-03-02 NOTE — PROGRESS NOTES
Kathy Beaver presents today for   Chief Complaint   Patient presents with    Hypertension     6 month f/u    Cholesterol Problem    Discuss Labs     Completed 2/27/2023       1. \"Have you been to the ER, urgent care clinic since your last visit? Hospitalized since your last visit? \" No.    2. \"Have you seen or consulted any other health care providers outside of the [address] [place] [address] since your last visit? \" No.     3. For patients aged 39-70: Has the patient had a colonoscopy / FIT/ Cologuard? No      If the patient is female:    4. For patients aged 41-77: Has the patient had a mammogram within the past 2 years? N/a  See top three    5. For patients aged 21-65: Has the patient had a pap smear?  N/a

## 2023-03-02 NOTE — ASSESSMENT & PLAN NOTE
Well-controlled, continue current medications   Cont Ativan as needed, mostly when traveling  No refills needed today  UDS and Sub agreement 6m

## 2023-03-30 DIAGNOSIS — E78.2 MIXED HYPERLIPIDEMIA: ICD-10-CM

## 2023-03-31 RX ORDER — ISOSORBIDE MONONITRATE 60 MG/1
TABLET, EXTENDED RELEASE ORAL
Qty: 90 TABLET | Refills: 3 | Status: SHIPPED | OUTPATIENT
Start: 2023-03-31

## 2023-03-31 RX ORDER — ATORVASTATIN CALCIUM 80 MG/1
TABLET, FILM COATED ORAL
Qty: 90 TABLET | Refills: 1 | Status: SHIPPED | OUTPATIENT
Start: 2023-03-31

## 2023-06-19 ENCOUNTER — OFFICE VISIT (OUTPATIENT)
Age: 53
End: 2023-06-19
Payer: COMMERCIAL

## 2023-06-19 VITALS
WEIGHT: 267 LBS | DIASTOLIC BLOOD PRESSURE: 82 MMHG | HEART RATE: 72 BPM | BODY MASS INDEX: 38.22 KG/M2 | HEIGHT: 70 IN | OXYGEN SATURATION: 96 % | SYSTOLIC BLOOD PRESSURE: 136 MMHG

## 2023-06-19 DIAGNOSIS — E78.00 PURE HYPERCHOLESTEROLEMIA: ICD-10-CM

## 2023-06-19 DIAGNOSIS — I10 ESSENTIAL HYPERTENSION WITH GOAL BLOOD PRESSURE LESS THAN 140/90: ICD-10-CM

## 2023-06-19 DIAGNOSIS — I25.10 CORONARY ARTERY DISEASE DUE TO LIPID RICH PLAQUE: Primary | ICD-10-CM

## 2023-06-19 DIAGNOSIS — I25.83 CORONARY ARTERY DISEASE DUE TO LIPID RICH PLAQUE: Primary | ICD-10-CM

## 2023-06-19 PROCEDURE — 3079F DIAST BP 80-89 MM HG: CPT | Performed by: INTERNAL MEDICINE

## 2023-06-19 PROCEDURE — 3075F SYST BP GE 130 - 139MM HG: CPT | Performed by: INTERNAL MEDICINE

## 2023-06-19 PROCEDURE — 99214 OFFICE O/P EST MOD 30 MIN: CPT | Performed by: INTERNAL MEDICINE

## 2023-06-19 NOTE — PROGRESS NOTES
[facility]    Spring Lopez is 47 y.o. male  with a history of CAD: Hypertension, hyperlipidemia, obesity, sleep apnea      Patient is here today for follow-up appointment for his hypertension, hyperlipidemia and CAD. Patient is more concerned that over the last 1 month he has been expensing some chest discomfort which is different than his chest discomfort in the past.  Sometimes it is heavy sometimes it is tingling. Sometimes last for seconds of time longer. No exertional symptoms. He feels like he may be anxious. Has not used nitroglycerin. Taking his medication as prescribed. Past Medical History:   Diagnosis Date    Alcohol consumption of one to four drinks per day 8/31/2021    Anxiety 8/31/2021    Asthma     Bilateral lower extremity edema 8/31/2021    Calculus of kidney     Coronary artery disease involving native coronary artery of native heart without angina pectoris 8/9/2021    Elevated LFTs 2/24/2022    HLD (hyperlipidemia)     Hypertension     Hypokalemia 9/17/2014    Obesity (BMI 30-39.9) 2/5/2018    Paresthesias/numbness 6/13/2018    Prediabetes 2/24/2022    Primary hypertension 6/13/2018    S/P cardiac cath 11/2020    Borderline mid LAD and mid LCx ( iFR of both lesion, not significant)     Sleep apnea     not using CPAP    Venous insufficiency of both lower extremities 1/15/2018       Review of Systems:  Cardiac symptoms as noted above in HPI. All others negative.     Current Outpatient Medications   Medication Sig    atorvastatin (LIPITOR) 80 MG tablet TAKE 1 TABLET BY MOUTH EVERY DAY    isosorbide mononitrate (IMDUR) 60 MG extended release tablet TAKE 1 TABLET BY MOUTH EVERY DAY    carvedilol (COREG) 12.5 MG tablet 1.5 tab BID Dr Jillian Rebolledo prescribes    spironolactone (ALDACTONE) 25 MG tablet Take 1 tablet by mouth daily    aspirin 81 MG EC tablet Take 1 tablet by mouth daily    etodolac (LODINE) 400 MG tablet TAKE 1

## 2023-06-20 DIAGNOSIS — F41.9 ANXIETY: Primary | ICD-10-CM

## 2023-06-20 RX ORDER — ASPIRIN 81 MG/1
81 TABLET ORAL DAILY
Qty: 30 TABLET | Refills: 0 | Status: SHIPPED | OUTPATIENT
Start: 2023-06-20 | End: 2023-06-20

## 2023-06-20 RX ORDER — LORAZEPAM 0.5 MG/1
0.5 TABLET ORAL NIGHTLY PRN
Qty: 30 TABLET | Refills: 0 | Status: SHIPPED | OUTPATIENT
Start: 2023-06-20 | End: 2024-06-19

## 2023-06-20 NOTE — TELEPHONE ENCOUNTER
Requested Prescriptions     Signed Prescriptions Disp Refills    LORazepam (ATIVAN) 0.5 MG tablet 30 tablet 0     Sig: Take 1 tablet by mouth nightly as needed for Anxiety.  Max Daily Amount: 0.5 mg     Authorizing Provider: Rell Knight

## 2023-06-20 NOTE — TELEPHONE ENCOUNTER
Requested refill:    - LORazepam (ATIVAN) 0.5 mg tablet    Pharmacy: [facility] [address]

## 2023-07-11 ENCOUNTER — HOSPITAL ENCOUNTER (OUTPATIENT)
Facility: HOSPITAL | Age: 53
Discharge: HOME | End: 2023-07-13
Attending: INTERNAL MEDICINE
Payer: COMMERCIAL

## 2023-07-11 ENCOUNTER — HOSPITAL ENCOUNTER (OUTPATIENT)
Facility: HOSPITAL | Age: 53
Discharge: HOME | End: 2023-07-14
Attending: INTERNAL MEDICINE
Payer: COMMERCIAL

## 2023-07-11 VITALS
HEART RATE: 68 BPM | DIASTOLIC BLOOD PRESSURE: 93 MMHG | WEIGHT: 260 LBS | HEIGHT: 70 IN | BODY MASS INDEX: 37.22 KG/M2 | SYSTOLIC BLOOD PRESSURE: 147 MMHG

## 2023-07-11 VITALS
DIASTOLIC BLOOD PRESSURE: 82 MMHG | WEIGHT: 267 LBS | SYSTOLIC BLOOD PRESSURE: 136 MMHG | HEIGHT: 70 IN | BODY MASS INDEX: 38.22 KG/M2

## 2023-07-11 DIAGNOSIS — I25.10 CORONARY ARTERY DISEASE DUE TO LIPID RICH PLAQUE: ICD-10-CM

## 2023-07-11 DIAGNOSIS — I25.83 CORONARY ARTERY DISEASE DUE TO LIPID RICH PLAQUE: ICD-10-CM

## 2023-07-11 DIAGNOSIS — I10 ESSENTIAL HYPERTENSION WITH GOAL BLOOD PRESSURE LESS THAN 140/90: ICD-10-CM

## 2023-07-11 LAB
ECHO AO ASC DIAM: 3.9 CM
ECHO AO ASCENDING AORTA INDEX: 1.65 CM/M2
ECHO AO ROOT DIAM: 4.1 CM
ECHO AO ROOT INDEX: 1.74 CM/M2
ECHO BSA: 2.41 M2
ECHO BSA: 2.45 M2
ECHO EST RA PRESSURE: 3 MMHG
ECHO LA VOL 2C: 41 ML (ref 18–58)
ECHO LA VOL 2C: 42 ML (ref 18–58)
ECHO LA VOL 4C: 58 ML (ref 18–58)
ECHO LA VOL 4C: 63 ML (ref 18–58)
ECHO LA VOLUME AREA LENGTH: 54 ML
ECHO LA VOLUME INDEX AREA LENGTH: 23 ML/M2 (ref 16–34)
ECHO LV E' LATERAL VELOCITY: 11 CM/S
ECHO LV E' SEPTAL VELOCITY: 6 CM/S
ECHO LV FRACTIONAL SHORTENING: 23 % (ref 28–44)
ECHO LV INTERNAL DIMENSION DIASTOLE INDEX: 1.82 CM/M2
ECHO LV INTERNAL DIMENSION DIASTOLIC: 4.3 CM (ref 4.2–5.9)
ECHO LV INTERNAL DIMENSION SYSTOLIC INDEX: 1.4 CM/M2
ECHO LV INTERNAL DIMENSION SYSTOLIC: 3.3 CM
ECHO LV IVSD: 1.1 CM (ref 0.6–1)
ECHO LV MASS 2D: 173.6 G (ref 88–224)
ECHO LV MASS INDEX 2D: 73.6 G/M2 (ref 49–115)
ECHO LV POSTERIOR WALL DIASTOLIC: 1.2 CM (ref 0.6–1)
ECHO LV RELATIVE WALL THICKNESS RATIO: 0.56
ECHO LVOT AREA: 3.8 CM2
ECHO LVOT DIAM: 2.2 CM
ECHO LVOT MEAN GRADIENT: 1 MMHG
ECHO LVOT PEAK GRADIENT: 3 MMHG
ECHO LVOT PEAK VELOCITY: 0.8 M/S
ECHO LVOT STROKE VOLUME INDEX: 27.9 ML/M2
ECHO LVOT SV: 65.7 ML
ECHO LVOT VTI: 17.3 CM
ECHO MV A VELOCITY: 0.62 M/S
ECHO MV E DECELERATION TIME (DT): 202 MS
ECHO MV E VELOCITY: 0.65 M/S
ECHO MV E/A RATIO: 1.05
ECHO MV E/E' LATERAL: 5.91
ECHO MV E/E' RATIO (AVERAGED): 8.37
ECHO MV E/E' SEPTAL: 10.83
ECHO RV FREE WALL PEAK S': 10 CM/S
ECHO RV TAPSE: 2 CM (ref 1.7–?)
NUC STRESS EJECTION FRACTION: 63 %
STRESS BASELINE DIAS BP: 93 MMHG
STRESS BASELINE HR: 68 BPM
STRESS BASELINE ST DEPRESSION: 0 MM
STRESS BASELINE SYS BP: 147 MMHG
STRESS ESTIMATED WORKLOAD: 1 METS
STRESS EXERCISE DUR MIN: 4 MIN
STRESS EXERCISE DUR SEC: 0 SEC
STRESS PEAK DIAS BP: 102 MMHG
STRESS PEAK SYS BP: 174 MMHG
STRESS PERCENT HR ACHIEVED: 59 %
STRESS POST PEAK HR: 98 BPM
STRESS RATE PRESSURE PRODUCT: NORMAL BPM*MMHG
STRESS TARGET HR: 167 BPM
TID: 1.1

## 2023-07-11 PROCEDURE — 2580000003 HC RX 258: Performed by: INTERNAL MEDICINE

## 2023-07-11 PROCEDURE — 93017 CV STRESS TEST TRACING ONLY: CPT

## 2023-07-11 PROCEDURE — 6360000002 HC RX W HCPCS: Performed by: INTERNAL MEDICINE

## 2023-07-11 PROCEDURE — 6360000004 HC RX CONTRAST MEDICATION: Performed by: INTERNAL MEDICINE

## 2023-07-11 PROCEDURE — 3430000000 HC RX DIAGNOSTIC RADIOPHARMACEUTICAL: Performed by: INTERNAL MEDICINE

## 2023-07-11 PROCEDURE — C8929 TTE W OR WO FOL WCON,DOPPLER: HCPCS

## 2023-07-11 PROCEDURE — A9502 TC99M TETROFOSMIN: HCPCS | Performed by: INTERNAL MEDICINE

## 2023-07-11 RX ORDER — SODIUM CHLORIDE 9 MG/ML
INJECTION, SOLUTION INTRAVENOUS ONCE
Status: COMPLETED | OUTPATIENT
Start: 2023-07-11 | End: 2023-07-11

## 2023-07-11 RX ORDER — REGADENOSON 0.08 MG/ML
0.4 INJECTION, SOLUTION INTRAVENOUS
Status: COMPLETED | OUTPATIENT
Start: 2023-07-11 | End: 2023-07-11

## 2023-07-11 RX ADMIN — REGADENOSON 0.4 MG: 0.08 INJECTION, SOLUTION INTRAVENOUS at 09:40

## 2023-07-11 RX ADMIN — SODIUM CHLORIDE: 900 INJECTION, SOLUTION INTRAVENOUS at 09:40

## 2023-07-11 RX ADMIN — PERFLUTREN 2 ML: 6.52 INJECTION, SUSPENSION INTRAVENOUS at 08:15

## 2023-07-11 RX ADMIN — TETROFOSMIN 11 MILLICURIE: 1.38 INJECTION, POWDER, LYOPHILIZED, FOR SOLUTION INTRAVENOUS at 07:00

## 2023-07-11 RX ADMIN — TETROFOSMIN 33 MILLICURIE: 1.38 INJECTION, POWDER, LYOPHILIZED, FOR SOLUTION INTRAVENOUS at 09:40

## 2023-07-18 ENCOUNTER — TELEPHONE (OUTPATIENT)
Age: 53
End: 2023-07-18

## 2023-07-18 NOTE — TELEPHONE ENCOUNTER
----- Message from Nelson Porras MD sent at 7/17/2023  7:02 AM EDT -----  Please inform patient regarding test finding  Appears normal.    Thanks  SP

## 2023-08-29 DIAGNOSIS — E11.65 TYPE 2 DIABETES MELLITUS WITH HYPERGLYCEMIA, WITHOUT LONG-TERM CURRENT USE OF INSULIN (HCC): Primary | ICD-10-CM

## 2023-08-29 DIAGNOSIS — I10 ESSENTIAL (PRIMARY) HYPERTENSION: ICD-10-CM

## 2023-08-29 DIAGNOSIS — R79.89 ELEVATED LFTS: ICD-10-CM

## 2023-08-30 ENCOUNTER — NURSE ONLY (OUTPATIENT)
Age: 53
End: 2023-08-30

## 2023-08-30 DIAGNOSIS — E11.65 TYPE 2 DIABETES MELLITUS WITH HYPERGLYCEMIA, WITHOUT LONG-TERM CURRENT USE OF INSULIN (HCC): ICD-10-CM

## 2023-08-30 DIAGNOSIS — R79.89 ELEVATED LFTS: ICD-10-CM

## 2023-08-30 DIAGNOSIS — I10 ESSENTIAL (PRIMARY) HYPERTENSION: ICD-10-CM

## 2023-08-31 LAB
6-ACETYLMORPHINE SCREEN URINE: NORMAL
A/G RATIO: 2.8 RATIO (ref 1.1–2.6)
ALBUMIN SERPL-MCNC: 4.7 G/DL (ref 3.5–5)
ALP BLD-CCNC: 50 U/L (ref 25–115)
ALT SERPL-CCNC: 39 U/L (ref 5–40)
AMPHETAMINE SCREEN, URINE: NORMAL
ANION GAP SERPL CALCULATED.3IONS-SCNC: 13 MMOL/L (ref 3–15)
AST SERPL-CCNC: 34 U/L (ref 10–37)
AVERAGE GLUCOSE: 139 MG/DL (ref 91–123)
BARBITURATES: NORMAL
BENZODIAZEPINES: NORMAL
BILIRUB SERPL-MCNC: 0.6 MG/DL (ref 0.2–1.2)
BUN BLDV-MCNC: 17 MG/DL (ref 6–22)
CALCIUM SERPL-MCNC: 9.4 MG/DL (ref 8.4–10.5)
CANNABINOIDS: NORMAL
CHLORIDE BLD-SCNC: 102 MMOL/L (ref 98–110)
CHOLESTEROL/HDL RATIO: 3 (ref 0–5)
CHOLESTEROL: 148 MG/DL (ref 110–200)
CO2: 23 MMOL/L (ref 20–32)
COCAINE (METABOLITE): NORMAL
CREAT SERPL-MCNC: 0.9 MG/DL (ref 0.5–1.2)
CREATININE URINE: 104 MG/DL
GLOBULIN: 1.7 G/DL (ref 2–4)
GLOMERULAR FILTRATION RATE: >60 ML/MIN/1.73 SQ.M.
GLUCOSE: 134 MG/DL (ref 70–99)
HBA1C MFR BLD: 6.5 % (ref 4.8–5.6)
HDLC SERPL-MCNC: 49 MG/DL
HYDROCODONE, OPI6M: NORMAL
LDL CHOLESTEROL CALCULATED: 78 MG/DL (ref 50–99)
LDL/HDL RATIO: 1.6
METHADONE SCREEN, URINE: NORMAL
MICROALB/CREAT RATIO (UG/MG CREAT.): 12.8 (ref 0–30)
MICROALBUMIN/CREAT 24H UR: 13.3 MG/L (ref 0.1–17)
NON-HDL CHOLESTEROL: 99 MG/DL
OPIATES: NORMAL
OXYCODONE URINE: NORMAL
PCP SCREEN: NORMAL
PH, URINE: 5.4 (ref 4.5–8)
POTASSIUM SERPL-SCNC: 4.1 MMOL/L (ref 3.5–5.5)
SODIUM BLD-SCNC: 138 MMOL/L (ref 133–145)
SPECIFIC GRAVITY UA: 1.02 (ref 1–1.03)
TOTAL PROTEIN: 6.4 G/DL (ref 6.4–8.3)
TRIGL SERPL-MCNC: 105 MG/DL (ref 40–149)
VLDLC SERPL CALC-MCNC: 21 MG/DL (ref 8–30)

## 2023-09-06 ENCOUNTER — OFFICE VISIT (OUTPATIENT)
Age: 53
End: 2023-09-06
Payer: COMMERCIAL

## 2023-09-06 VITALS
SYSTOLIC BLOOD PRESSURE: 129 MMHG | TEMPERATURE: 98.3 F | BODY MASS INDEX: 39.22 KG/M2 | RESPIRATION RATE: 17 BRPM | DIASTOLIC BLOOD PRESSURE: 88 MMHG | HEIGHT: 70 IN | WEIGHT: 274 LBS | OXYGEN SATURATION: 98 % | HEART RATE: 70 BPM

## 2023-09-06 DIAGNOSIS — I25.83 CORONARY ARTERY DISEASE DUE TO LIPID RICH PLAQUE: ICD-10-CM

## 2023-09-06 DIAGNOSIS — Z00.00 ANNUAL PHYSICAL EXAM: Primary | ICD-10-CM

## 2023-09-06 DIAGNOSIS — L03.116 CELLULITIS OF LEFT LEG: ICD-10-CM

## 2023-09-06 DIAGNOSIS — Z78.9 ALCOHOL CONSUMPTION OF ONE TO FOUR DRINKS PER DAY: ICD-10-CM

## 2023-09-06 DIAGNOSIS — E11.65 TYPE 2 DIABETES MELLITUS WITH HYPERGLYCEMIA, WITHOUT LONG-TERM CURRENT USE OF INSULIN (HCC): ICD-10-CM

## 2023-09-06 DIAGNOSIS — E66.9 OBESITY (BMI 30-39.9): ICD-10-CM

## 2023-09-06 DIAGNOSIS — I25.10 CORONARY ARTERY DISEASE DUE TO LIPID RICH PLAQUE: ICD-10-CM

## 2023-09-06 DIAGNOSIS — E78.2 MIXED HYPERLIPIDEMIA: ICD-10-CM

## 2023-09-06 DIAGNOSIS — F41.9 ANXIETY: ICD-10-CM

## 2023-09-06 DIAGNOSIS — E87.6 HYPOKALEMIA: ICD-10-CM

## 2023-09-06 DIAGNOSIS — I10 ESSENTIAL HYPERTENSION: ICD-10-CM

## 2023-09-06 DIAGNOSIS — R60.0 BILATERAL LOWER EXTREMITY EDEMA: ICD-10-CM

## 2023-09-06 DIAGNOSIS — G47.30 SLEEP APNEA, UNSPECIFIED TYPE: ICD-10-CM

## 2023-09-06 PROBLEM — Z28.9 COVID-19 VACCINATION NOT DONE: Status: RESOLVED | Noted: 2021-08-31 | Resolved: 2023-09-06

## 2023-09-06 PROBLEM — R73.03 PREDIABETES: Status: RESOLVED | Noted: 2022-02-24 | Resolved: 2023-09-06

## 2023-09-06 PROBLEM — R73.03 PREDIABETES: Status: ACTIVE | Noted: 2022-02-24

## 2023-09-06 PROBLEM — R79.89 ELEVATED LFTS: Status: RESOLVED | Noted: 2022-02-24 | Resolved: 2023-09-06

## 2023-09-06 PROCEDURE — 99396 PREV VISIT EST AGE 40-64: CPT | Performed by: NURSE PRACTITIONER

## 2023-09-06 PROCEDURE — 90471 IMMUNIZATION ADMIN: CPT | Performed by: NURSE PRACTITIONER

## 2023-09-06 PROCEDURE — 90715 TDAP VACCINE 7 YRS/> IM: CPT | Performed by: NURSE PRACTITIONER

## 2023-09-06 PROCEDURE — 3079F DIAST BP 80-89 MM HG: CPT | Performed by: NURSE PRACTITIONER

## 2023-09-06 PROCEDURE — 3074F SYST BP LT 130 MM HG: CPT | Performed by: NURSE PRACTITIONER

## 2023-09-06 RX ORDER — SPIRONOLACTONE 25 MG/1
25 TABLET ORAL DAILY
Qty: 93 TABLET | Refills: 3 | Status: SHIPPED | OUTPATIENT
Start: 2023-09-06

## 2023-09-06 RX ORDER — ESCITALOPRAM OXALATE 10 MG/1
10 TABLET ORAL DAILY
Qty: 90 TABLET | Refills: 0 | Status: SHIPPED | OUTPATIENT
Start: 2023-09-06

## 2023-09-06 NOTE — ASSESSMENT & PLAN NOTE
Well-controlled, continue current medications   -105; -78  Cont statin therapy as prescribed by Dr Aakash Ndiaye fried fatty or oily foods in diet  Limit red meats, processed foods, and alcohol. Limit fast food  Increase physical activity to 60 minutes of moderate intensity aerobic exercise per week. Increase water intake  Reduce alcohol intake    How to raise HDL if low:  Consume oats, beans, legumes, olive oil, whole grains, nuts, seeds, fatty fish, and berries.   Lose weight/fat  Reduce alcohol consumption  Smoking cessation

## 2023-09-06 NOTE — ASSESSMENT & PLAN NOTE
Uncontrolled, continue current medications and lifestyle modifications recommended   Cont Ativan prn  Initiate Lexapro 10mg   After 2 weeks if sx not improved, may increase to 20mg  Follow up 3m

## 2023-09-06 NOTE — PROGRESS NOTES
Chief Complaint   Patient presents with    Annual Exam    Discuss Labs     There were no vitals taken for this visit. 1. \"Have you been to the ER, urgent care clinic since your last visit? Hospitalized since your last visit? \" Yes 9-1-23    2. \"Have you seen or consulted any other health care providers outside of the [address] [place] [address] since your last visit? \" No     3. For patients aged 43-73: Has the patient had a colonoscopy / FIT/ Cologuard?  Yes - no Care Gap present

## 2023-09-06 NOTE — ASSESSMENT & PLAN NOTE
Borderline controlled, lifestyle modifications recommended   A1c creeping up  A1c 6-6.5  Discussed treatment if A1c >7. Reduce carbs and sugary foods  Increase exercise  Encourage weight loss  Increase water intake    Reviewed current value and goal related to age, discussed dietary changes including to select low sugar and low simple carbohydrates and eating stable protein throughout the day.      Discussed consequences of poor management with vascular stress, increased occurrence of neuropathy causing pain and disability, decreased circulation and increased occurrence of infection resulting in extremity amputation and general illness, and increased incidence of death by renal failure, stroke and MI.

## 2023-09-06 NOTE — PROGRESS NOTES
[facility] of [facility]      9/6/2023    Anabell Rogers 1970 is a pleasant White (non-) male for CPE. Reason for Visit:    Chief Complaint   Patient presents with    Annual Exam    Discuss Labs       Patient Active Problem List   Diagnosis    Bilateral lower extremity edema    Anxiety    Obesity (BMI 30-39. 9)    Mixed hyperlipidemia    Annual physical exam    Essential hypertension    Venous insufficiency of both lower extremities    Sleep apnea    Hypokalemia    Coronary artery disease due to lipid rich plaque    Alcohol consumption of one to four drinks per day    Type 2 diabetes mellitus with hyperglycemia, without long-term current use of insulin (HCC)       Past Medical History:   Diagnosis Date    Alcohol consumption of one to four drinks per day 8/31/2021    Anxiety 8/31/2021    Asthma     Bilateral lower extremity edema 8/31/2021    Calculus of kidney     Coronary artery disease due to lipid rich plaque 8/9/2021    Dr SHER Mcfarland statin therapy     Coronary artery disease involving native coronary artery of native heart without angina pectoris 8/9/2021    Elevated LFTs 2/24/2022    Essential hypertension 6/13/2018    Dr Rufina Tuttle (hyperlipidemia)     Hypertension     Hypokalemia 9/17/2014    Mixed hyperlipidemia 8/31/2021    -105; -78 Cont statin therapy as prescribed by Dr Maite Garay    Obesity (BMI 30-39.9) 2/5/2018    Paresthesias/numbness 6/13/2018    Prediabetes 2/24/2022    Primary hypertension 6/13/2018    S/P cardiac cath 11/2020    Borderline mid LAD and mid LCx ( iFR of both lesion, not significant)     Sleep apnea     not using CPAP    Type 2 diabetes mellitus with hyperglycemia, without long-term current use of insulin ([address]) 9/12/2022    A1c creeping up A1c 6-6.5 Discussed treatment if A1c >7.      Venous insufficiency of both lower extremities 1/15/2018       Past Surgical History:   Procedure Laterality Date    HEENT      tonsillectomy    LITHOTRIPSY

## 2023-09-06 NOTE — ASSESSMENT & PLAN NOTE
Gained 14lbs in 3 months    BMI is out of normal parameters and plan is as follows: Discussed in great detail on diet, portion control, exercise, avoiding foods high in sugar, carbs and starches, fatty/greasy foods and to eat until satisfied not til full. I have counseled this patient on diet and exercise regimens as well.

## 2023-09-06 NOTE — ASSESSMENT & PLAN NOTE
Lab Reviewed with Patient:  low globulin-alcohol??  A1c 6-6.5  -105; -78    Health Maintenance:   Colonoscopy:  Pt to schedule  Immunizations:  Tdap today  Eye Exam: pt to schedule    Specialist:  Dr Ben Cloud

## 2023-09-18 ENCOUNTER — TELEPHONE (OUTPATIENT)
Age: 53
End: 2023-09-18

## 2023-09-18 DIAGNOSIS — F41.9 ANXIETY: Primary | ICD-10-CM

## 2023-09-18 RX ORDER — DULOXETIN HYDROCHLORIDE 30 MG/1
30 CAPSULE, DELAYED RELEASE ORAL DAILY
Qty: 90 CAPSULE | Refills: 0 | Status: SHIPPED | OUTPATIENT
Start: 2023-09-18

## 2023-09-18 NOTE — TELEPHONE ENCOUNTER
Started lexapro 2 weeks ago, experiencing increased anxiety. Pt stopped lexapro 9/17/23. Initiate low dose Cymbalta 30mg qd. May take a few weeks to see change with anxiety. Keep 3 month follow up appt in Dec. May schedule a sooner appt if necessary. Diagnosis Orders   1.  Anxiety  DULoxetine (CYMBALTA) 30 MG extended release capsule

## 2023-09-18 NOTE — TELEPHONE ENCOUNTER
----- Message from Lorri. Nathalie Quick sent at 9/18/2023 10:02 AM EDT -----  Regarding: Lexapro side effects  Contact: [phone]  Dr. Yohannes Barney,    I wanted to discuss the side effects of the Lexapro. I seems like my anxiety is worse since I started this medication. For the first 3-4 days no issues. Then about 10 days ago. I had a bad anxiety episode. One of the worse I have had in a while. I have had three of those this past week. I feel very stressed and anxious. I get bad sweats, this crazy rush feeling, and tingling. I have had to  take the lorazepam to control these bad feelings. I tried cutting the dose to 5 mg, for the last few days. Still has similar side effects. I stopped taking the medication yesterday. I can not do my job feeling this anxious. Please help. Thank you,  Lorri.  Nathalie Quick.

## 2023-10-06 PROBLEM — Z00.00 ANNUAL PHYSICAL EXAM: Status: RESOLVED | Noted: 2021-08-31 | Resolved: 2023-10-06

## 2023-10-10 DIAGNOSIS — E78.2 MIXED HYPERLIPIDEMIA: ICD-10-CM

## 2023-10-11 RX ORDER — ATORVASTATIN CALCIUM 80 MG/1
TABLET, FILM COATED ORAL
Qty: 90 TABLET | Refills: 0 | Status: SHIPPED | OUTPATIENT
Start: 2023-10-11 | End: 2023-12-04 | Stop reason: SDUPTHER

## 2023-12-04 ENCOUNTER — OFFICE VISIT (OUTPATIENT)
Age: 53
End: 2023-12-04
Payer: COMMERCIAL

## 2023-12-04 VITALS
TEMPERATURE: 98.6 F | SYSTOLIC BLOOD PRESSURE: 139 MMHG | WEIGHT: 276 LBS | OXYGEN SATURATION: 97 % | BODY MASS INDEX: 39.51 KG/M2 | RESPIRATION RATE: 20 BRPM | HEART RATE: 74 BPM | DIASTOLIC BLOOD PRESSURE: 91 MMHG | HEIGHT: 70 IN

## 2023-12-04 DIAGNOSIS — E11.65 TYPE 2 DIABETES MELLITUS WITH HYPERGLYCEMIA, WITHOUT LONG-TERM CURRENT USE OF INSULIN (HCC): ICD-10-CM

## 2023-12-04 DIAGNOSIS — F41.9 ANXIETY: Primary | ICD-10-CM

## 2023-12-04 DIAGNOSIS — E78.2 MIXED HYPERLIPIDEMIA: ICD-10-CM

## 2023-12-04 DIAGNOSIS — I10 ESSENTIAL HYPERTENSION: ICD-10-CM

## 2023-12-04 PROCEDURE — 3075F SYST BP GE 130 - 139MM HG: CPT | Performed by: NURSE PRACTITIONER

## 2023-12-04 PROCEDURE — 99213 OFFICE O/P EST LOW 20 MIN: CPT | Performed by: NURSE PRACTITIONER

## 2023-12-04 PROCEDURE — 3044F HG A1C LEVEL LT 7.0%: CPT | Performed by: NURSE PRACTITIONER

## 2023-12-04 PROCEDURE — 3080F DIAST BP >= 90 MM HG: CPT | Performed by: NURSE PRACTITIONER

## 2023-12-04 RX ORDER — LORAZEPAM 0.5 MG/1
0.5 TABLET ORAL NIGHTLY PRN
Qty: 30 TABLET | Refills: 0 | Status: SHIPPED | OUTPATIENT
Start: 2023-12-04 | End: 2024-12-03

## 2023-12-04 RX ORDER — DULOXETIN HYDROCHLORIDE 30 MG/1
30 CAPSULE, DELAYED RELEASE ORAL DAILY
Qty: 93 CAPSULE | Refills: 1 | Status: SHIPPED | OUTPATIENT
Start: 2023-12-04

## 2023-12-04 RX ORDER — ATORVASTATIN CALCIUM 80 MG/1
80 TABLET, FILM COATED ORAL DAILY
Qty: 93 TABLET | Refills: 2 | Status: SHIPPED | OUTPATIENT
Start: 2023-12-04

## 2023-12-04 SDOH — ECONOMIC STABILITY: INCOME INSECURITY: HOW HARD IS IT FOR YOU TO PAY FOR THE VERY BASICS LIKE FOOD, HOUSING, MEDICAL CARE, AND HEATING?: NOT HARD AT ALL

## 2023-12-04 SDOH — ECONOMIC STABILITY: FOOD INSECURITY: WITHIN THE PAST 12 MONTHS, YOU WORRIED THAT YOUR FOOD WOULD RUN OUT BEFORE YOU GOT MONEY TO BUY MORE.: NEVER TRUE

## 2023-12-04 SDOH — ECONOMIC STABILITY: FOOD INSECURITY: WITHIN THE PAST 12 MONTHS, THE FOOD YOU BOUGHT JUST DIDN'T LAST AND YOU DIDN'T HAVE MONEY TO GET MORE.: NEVER TRUE

## 2023-12-04 ASSESSMENT — PATIENT HEALTH QUESTIONNAIRE - PHQ9
1. LITTLE INTEREST OR PLEASURE IN DOING THINGS: 0
SUM OF ALL RESPONSES TO PHQ QUESTIONS 1-9: 0
2. FEELING DOWN, DEPRESSED OR HOPELESS: 0
SUM OF ALL RESPONSES TO PHQ9 QUESTIONS 1 & 2: 0
SUM OF ALL RESPONSES TO PHQ QUESTIONS 1-9: 0

## 2023-12-04 NOTE — ASSESSMENT & PLAN NOTE
Pt discovered dad has insulin pump  Discussed proper diet and exercise  Adore level 6m  Discussed tx options is A1c >7 ie metformin, glipizide, GLP1.     9/12/23:  A1c creeping up  A1c 6-6.5  Discussed treatment if A1c >7.

## 2023-12-04 NOTE — ASSESSMENT & PLAN NOTE
Well-controlled, continue current medications  Cymbalta effective. Cont therapy  Last ativan refill June 2023 for 30 tabs  Pt seeking refill of ativan to have on hand. 9/18/23:  Lexapro dc'd due to s/e  Cymbalta 30mg initiated.       9/6/2023 note:  Cont Ativan prn  Initiate Lexapro 10mg   After 2 weeks if sx not improved, may increase to 20mg  Follow up 3m

## 2023-12-04 NOTE — ASSESSMENT & PLAN NOTE
BP slightly elevated today  [place]? Home BP readings 120-130/80s.    Pt to cont to monitor readings

## 2024-02-06 DIAGNOSIS — F41.9 ANXIETY: ICD-10-CM

## 2024-02-06 DIAGNOSIS — I10 ESSENTIAL HYPERTENSION: ICD-10-CM

## 2024-02-06 RX ORDER — ASPIRIN 81 MG/1
81 TABLET, COATED ORAL DAILY
Qty: 30 TABLET | Refills: 0 | Status: SHIPPED | OUTPATIENT
Start: 2024-02-06

## 2024-02-07 RX ORDER — CARVEDILOL 12.5 MG/1
TABLET ORAL
Qty: 270 TABLET | Refills: 2 | Status: SHIPPED | OUTPATIENT
Start: 2024-02-07

## 2024-02-07 RX ORDER — ISOSORBIDE MONONITRATE 60 MG/1
TABLET, EXTENDED RELEASE ORAL
Qty: 90 TABLET | Refills: 3 | Status: SHIPPED | OUTPATIENT
Start: 2024-02-07

## 2024-03-04 DIAGNOSIS — I25.10 CORONARY ARTERY DISEASE DUE TO LIPID RICH PLAQUE: Primary | ICD-10-CM

## 2024-03-04 DIAGNOSIS — F41.9 ANXIETY: ICD-10-CM

## 2024-03-04 DIAGNOSIS — I25.83 CORONARY ARTERY DISEASE DUE TO LIPID RICH PLAQUE: Primary | ICD-10-CM

## 2024-03-04 RX ORDER — ASPIRIN 81 MG/1
81 TABLET ORAL DAILY
Qty: 95 TABLET | Refills: 1 | Status: SHIPPED | OUTPATIENT
Start: 2024-03-04

## 2024-03-06 ENCOUNTER — OFFICE VISIT (OUTPATIENT)
Age: 54
End: 2024-03-06
Payer: COMMERCIAL

## 2024-03-06 VITALS
OXYGEN SATURATION: 97 % | SYSTOLIC BLOOD PRESSURE: 136 MMHG | HEIGHT: 70 IN | RESPIRATION RATE: 16 BRPM | DIASTOLIC BLOOD PRESSURE: 88 MMHG | TEMPERATURE: 97.2 F | WEIGHT: 277.8 LBS | HEART RATE: 88 BPM | BODY MASS INDEX: 39.77 KG/M2

## 2024-03-06 DIAGNOSIS — L02.92 BOILS OF MULTIPLE SITES: ICD-10-CM

## 2024-03-06 DIAGNOSIS — S81.801A OPEN WOUND OF RIGHT LOWER LEG, INITIAL ENCOUNTER: Primary | ICD-10-CM

## 2024-03-06 PROCEDURE — 3079F DIAST BP 80-89 MM HG: CPT | Performed by: NURSE PRACTITIONER

## 2024-03-06 PROCEDURE — 3075F SYST BP GE 130 - 139MM HG: CPT | Performed by: NURSE PRACTITIONER

## 2024-03-06 PROCEDURE — 99214 OFFICE O/P EST MOD 30 MIN: CPT | Performed by: NURSE PRACTITIONER

## 2024-03-06 RX ORDER — DOXYCYCLINE HYCLATE 100 MG
100 TABLET ORAL 2 TIMES DAILY
Qty: 20 TABLET | Refills: 0 | Status: SHIPPED | OUTPATIENT
Start: 2024-03-06 | End: 2024-03-16

## 2024-03-06 SDOH — ECONOMIC STABILITY: INCOME INSECURITY: HOW HARD IS IT FOR YOU TO PAY FOR THE VERY BASICS LIKE FOOD, HOUSING, MEDICAL CARE, AND HEATING?: NOT HARD AT ALL

## 2024-03-06 SDOH — ECONOMIC STABILITY: FOOD INSECURITY: WITHIN THE PAST 12 MONTHS, THE FOOD YOU BOUGHT JUST DIDN'T LAST AND YOU DIDN'T HAVE MONEY TO GET MORE.: NEVER TRUE

## 2024-03-06 SDOH — ECONOMIC STABILITY: FOOD INSECURITY: WITHIN THE PAST 12 MONTHS, YOU WORRIED THAT YOUR FOOD WOULD RUN OUT BEFORE YOU GOT MONEY TO BUY MORE.: NEVER TRUE

## 2024-03-06 NOTE — PROGRESS NOTES
[facility] of [address]  [address]  [place], [place] [address]  [phone] office/[phone] fax    3/6/2024    Lincoln Freeman Jr 1970 is a pleasant White (non-) male.     Todays concern/HPI:  RLE bumps pop up and go away. Start off as small bumps under the skin then they grow larger and then eventually will go away. Takes months. RLE lateral asp-walked past furniture and caught leg causing an open wound. Coagulated jelly substance, not pus. Treating with H2O2 and alcohol along with triplle ABX oint.  This area is showing signs of healing and has shrunk in size.  This occurred last month. RLE shin prox bump developed in nov and distal developed 1m ago.  He denies pain to all areas unless he presses on them.    Review of Systems - Negative except above    Physical:   /88 (Site: Left Upper Arm, Position: Sitting, Cuff Size: Large Adult)   Pulse 88   Temp 97.2 °F (36.2 °C) (Temporal)   Resp 16   Ht 1.778 m (5' 10\")   Wt 126 kg (277 lb 12.8 oz)   SpO2 97%   BMI 39.86 kg/m²     Exam:   Physical Exam  Constitutional:       Appearance: Normal appearance. He is obese.   Eyes:      Extraocular Movements: Extraocular movements intact.      Conjunctiva/sclera: Conjunctivae normal.   Cardiovascular:      Rate and Rhythm: Normal rate.   Pulmonary:      Effort: Pulmonary effort is normal.   Musculoskeletal:         General: Normal range of motion.        Legs:       Comments: Right lower extremity shin proximal area-cyst/boil: Measures 2 cm x 1.5 cm.  Dark purple discoloration noted only to affected area.  No drainage noted.  Soft to palpation.    Right lower extremity shin distal area-cyst/boil: Measures 1 cm x 1.5 cm.  Pink discoloration noted only to the affected area.  No drainage.  Soft to palpation    Right lower extremity lateral aspect mid calf area-open  Wound: Measures approximately 1 cm x 1.1 cm.  Slough noted to entire wound bed.  Small amount of serous

## 2024-03-06 NOTE — PROGRESS NOTES
Lincoln Freeman Jr presents today for   Chief Complaint   Patient presents with    Skin Problem           \"Have you been to the ER, urgent care clinic since your last visit?  Hospitalized since your last visit?\"    NO    “Have you seen or consulted any other health care providers outside of [facility] since your last visit?”    YES - When: approximately 6 months ago.  Where and Why: Dr Matthew Woodson.    “Have you had a colorectal cancer screening such as a colonoscopy/FIT/Cologuard?    NO

## 2024-03-06 NOTE — ASSESSMENT & PLAN NOTE
Stop triple antibiotic ointment, hydrogen peroxide, alcohol  Cleanse with soap and water  Rinse with water and pat dry  Keep covered when out of the home  Initiate antibiotic  Complete antibiotic in its entirety  Patient has history of venous insufficiency.  Inform patient if he does not see healing after performing wound care and taking antibiotic, call this office to be referred to vascular.

## 2024-03-25 ENCOUNTER — OFFICE VISIT (OUTPATIENT)
Age: 54
End: 2024-03-25
Payer: COMMERCIAL

## 2024-03-25 VITALS
HEIGHT: 70 IN | HEART RATE: 85 BPM | OXYGEN SATURATION: 97 % | WEIGHT: 280 LBS | SYSTOLIC BLOOD PRESSURE: 120 MMHG | BODY MASS INDEX: 40.09 KG/M2 | DIASTOLIC BLOOD PRESSURE: 80 MMHG

## 2024-03-25 DIAGNOSIS — I10 ESSENTIAL HYPERTENSION WITH GOAL BLOOD PRESSURE LESS THAN 140/90: ICD-10-CM

## 2024-03-25 DIAGNOSIS — E78.00 PURE HYPERCHOLESTEROLEMIA: ICD-10-CM

## 2024-03-25 DIAGNOSIS — I25.83 CORONARY ARTERY DISEASE DUE TO LIPID RICH PLAQUE: Primary | ICD-10-CM

## 2024-03-25 DIAGNOSIS — I25.10 CORONARY ARTERY DISEASE DUE TO LIPID RICH PLAQUE: Primary | ICD-10-CM

## 2024-03-25 PROCEDURE — 3079F DIAST BP 80-89 MM HG: CPT | Performed by: INTERNAL MEDICINE

## 2024-03-25 PROCEDURE — 3074F SYST BP LT 130 MM HG: CPT | Performed by: INTERNAL MEDICINE

## 2024-03-25 PROCEDURE — 99214 OFFICE O/P EST MOD 30 MIN: CPT | Performed by: INTERNAL MEDICINE

## 2024-03-25 NOTE — PROGRESS NOTES
area is good. There is normal wall motion in the defect area. The defect appears to be an artifact caused by soft tissue.  Corrected with soft tissue corrected imaging.    Perfusion Conclusion: TID ratio is 1.10.     IMPRESSION & PLAN:  Mr. Freeman is 53 y.o.. male with multiple medical problem     CAD:  Patient had a cardiac catheterization in November 2020 which showed two-vessel CAD, IFR was not physiologically significant  NST in 07/2023, probably normal perfusion, normal EF, low risk study  Currently without any chest pain or chest tightness.  Has not used any nitroglycerin  Continue aspirin, carvedilol, atorvastatin, Imdur.     Hypertension: /80 continue isosorbide, Coreg, spironolactone.  Salt restriction advised.  Dietary modification, weight loss and exercise plan discussed again    Hyperlipidemia:  Based on his cholesterol profile in January 2020, his 10-year risk of having ASCVD is 7.8%.  Currently on atorvastatin 80 mg daily.  Last LDL 78     This plan was discussed with patient who is in agreement.    Thank you for allowing me to participate in patient care. Please feel free to call me if you have any question or concern.     Julian Castro MD  Please note: This document has been produced using voice recognition software. Unrecognized errors in transcription may be present.

## 2024-04-25 NOTE — PATIENT INSTRUCTIONS
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  He had residual disease involving the right lower lobe with 60% residual viable tumor and positive treatment effect.  Margins were negative.  Lymph nodes negative.  He initiated immunotherapy maintenance in February 2024.  He received his most recent dose Opdivo 4/4/2024.  He had a PET/CT in early April showing very low level activity in the paratracheal and supraclavicular lymph node that is very close to background mediastinal/hepatic uptake. Compared to his prior malignancy, the degree of FDG uptake is markedly lower and serial observation planned.     He was then admitted from 4/12/2024 through 4/22/2024 with severe sepsis which was attributed to bilateral pyelonephritis in the context of abnormal CT scan findings.  His cultures were negative.  However clinically he improved on antibiotics and he received a 10-day course of cefepime.    Pt reports nausea and weakness prior to admission. Mild diarrhea today only. +abdominal pain now resolved. No headaches. No acute vision changes. No focal weakness      Allergies   Allergen Reactions    Cymbalta [Duloxetine Hcl] GI Intolerance    Gabapentin Mental Status Change     Pt states that this medication \"makes him feel foolish in his head\".     Remeron [Mirtazapine] Other (See Comments)     Excess sedation    Toradol [Ketorolac Tromethamine] GI Intolerance     Projectile vomiting     Latex Other (See Comments)     Latex allergy     Tape Rash       Past Medical " Search YouTube for my channel:    Dr. Urmila garcía/Giovanny History:   Diagnosis Date    Abnormal ECG     Arrhythmia 2019    Asthma 2019    Emphysema, COPD    Bronchogenic cancer of right lung 10/04/2023    Diabetes mellitus Borderline    Emphysema/COPD     Erectile disorder     GERD (gastroesophageal reflux disease)     History of chemotherapy     Hyperlipidemia     Hypertension 2019    Lung nodule     Mumps     Mumps     Pruritus     after bath    Slow to wake up after anesthesia     Wears dentures     upper only    Wears hearing aid in both ears     usually only wears right         Current Facility-Administered Medications:     acetaminophen (TYLENOL) tablet 650 mg, 650 mg, Oral, Q4H PRN **OR** acetaminophen (TYLENOL) 160 MG/5ML oral solution 650 mg, 650 mg, Oral, Q4H PRN **OR** acetaminophen (TYLENOL) suppository 650 mg, 650 mg, Rectal, Q4H PRN, Mary Mitchell DO    albumin human 25 % IV SOLN 25 g, 25 g, Intravenous, BID, [place], Kurt Martinez MD, 25 g at 04/25/24 1237    [Held by provider] amLODIPine (NORVASC) tablet 5 mg, 5 mg, Oral, Q24H, Mary Mitchell DO    budesonide-formoterol (SYMBICORT) 160-4.5 MCG/ACT inhaler 2 puff, 2 puff, Inhalation, BID - RT, 2 puff at 04/25/24 0858 **AND** tiotropium (SPIRIVA RESPIMAT) 2.5 mcg/act aerosol solution inhaler, 2 puff, Inhalation, Daily - RT, Mary Mitchell DO, 2 puff at 04/25/24 0858    calcium carbonate (TUMS) chewable tablet 500 mg (200 mg elemental), 2 tablet, Oral, BID PRN, Mary Mitchell DO    cefepime 2000 mg IVPB in 100 mL NS (MBP), 2,000 mg, Intravenous, Q24H, Derian Barry MD    DAPTOmycin (CUBICIN) 650 mg in sodium chloride 0.9 % 50 mL IVPB, 8 mg/kg (Adjusted), Intravenous, Q48H, Derian Barry MD, Last Rate: 100 mL/hr at 04/25/24 1014, 650 mg at 04/25/24 1014    ferrous sulfate tablet 325 mg, 325 mg, Oral, Daily With Breakfast, Mary Mitchell DO, 325 mg at 04/25/24 0816    fluticasone (FLONASE) 50 MCG/ACT nasal spray 2 spray, 2 spray, Each Nare, Daily, Mary Mitchell DO, 2 spray at 04/25/24  0817    heparin (porcine) 5000 UNIT/ML injection 5,000 Units, 5,000 Units, Subcutaneous, Q12H, Mary Mitchell DO, 5,000 Units at 04/25/24 0816    HYDROcodone-acetaminophen (NORCO) 7.5-325 MG per tablet 1 tablet, 1 tablet, Oral, Q6H PRN, Mary Mitchell DO    lactated ringers 990 mL with potassium chloride 20 mEq infusion, , Intravenous, Continuous, [place], Kurt Martinez MD, Last Rate: 75 mL/hr at 04/25/24 1320, New Bag at 04/25/24 1320    midodrine (PROAMATINE) tablet 10 mg, 10 mg, Oral, TID AC, Bharath Jimenez MD, 10 mg at 04/25/24 1742    nitroglycerin (NITROSTAT) SL tablet 0.4 mg, 0.4 mg, Sublingual, Q5 Min PRN, Mary Mitchell DO    ondansetron ODT (ZOFRAN-ODT) disintegrating tablet 4 mg, 4 mg, Oral, Q6H PRN **OR** ondansetron (ZOFRAN) injection 4 mg, 4 mg, Intravenous, Q6H PRN, Mary Mitchell DO, 4 mg at 04/25/24 0440    pantoprazole (PROTONIX) EC tablet 40 mg, 40 mg, Oral, Q AM, Mary Mitchell DO, 40 mg at 04/25/24 0444    pravastatin (PRAVACHOL) tablet 80 mg, 80 mg, Oral, Nightly, Mary Mitchell DO, 80 mg at 04/24/24 2052    sodium bicarbonate tablet 650 mg, 650 mg, Oral, BID, Bharath Jimenez MD, 650 mg at 04/25/24 1237    Sodium Chloride (PF) 0.9 % 10 mL, 10 mL, Intravenous, PRN, Mary Mitchell DO    sodium chloride 0.9 % flush 10 mL, 10 mL, Intravenous, Q12H, Mary Mitchell DO, 10 mL at 04/25/24 0818    sodium chloride 0.9 % flush 10 mL, 10 mL, Intravenous, PRN, Mary Mitchell, DO    sodium chloride 0.9 % infusion 40 mL, 40 mL, Intravenous, Stephen ALFREDO Lyndsey, DO    Past Surgical History:   Procedure Laterality Date    BONE BIOPSY      broken bone surgery in his face    BRONCHOSCOPY THORACOTOMY Right 1/9/2024    Procedure: THORACOTOMY FOR LOWER LOBECTOMY AND MEDISTINAL LYMPH NODE DISSECTION RIGHT;  Surgeon: Joey Patel MD;  [facility];  Service: Cardiothoracic;  Laterality: Right;    BRONCHOSCOPY WITH ION ROBOTIC ASSIST N/A 09/15/2023    Procedure: BRONCHOSCOPY  NAVIGATION WITH ENDOBRONCHIAL ULTRASOUND AND ION ROBOT;  Surgeon: Octaviano Sampson MD;  Location:  [facility];  Service: Robotics - Pulmonary;  Laterality: N/A;  ion #6 - 0032  - 0015  Cath guide 0061    EBUS balloon removed and intact    CARDIAC ELECTROPHYSIOLOGY PROCEDURE N/A 08/17/2021    Procedure: Pacemaker DC new;  Surgeon: Kayy Box MD;  Location:  [facility];  Service: Cardiology;  Laterality: N/A;    FACIAL FRACTURE SURGERY      PACEMAKER IMPLANTATION         Social History     Socioeconomic History    Marital status: Significant Other    Number of children: 3   Tobacco Use    Smoking status: Former     Current packs/day: 0.50     Average packs/day: 0.5 packs/day for 56.3 years (28.7 ttl pk-yrs)     Types: Cigarettes     Start date: 1/1/1968    Smokeless tobacco: Never    Tobacco comments:     Pt states that he quit smoking on 1/8/24. The day before his sx.    Vaping Use    Vaping status: Never Used   Substance and Sexual Activity    Alcohol use: Never    Drug use: Never    Sexual activity: Defer     Partners: Female     Birth control/protection: None       Family History   Problem Relation Age of Onset    Aneurysm Mother         brain    Dementia Father     Leukemia Sister     Heart disease Paternal Grandmother     Hypertension Paternal Grandfather        Oncology/Hematology History   Malignant neoplasm of lower lobe of right lung   10/4/2023 Initial Diagnosis    Malignant neoplasm of lower lobe of right lung     10/4/2023 Cancer Staged    Staging form: Lung, AJCC 8th Edition  - Clinical: Stage IIIA (cT2b, cN2, cM0) - Signed by Neetu Ashley MD on 10/4/2023     10/23/2023 - 12/5/2023 Chemotherapy    OP LUNG  Nivolumab 360mg /  PACLitaxel / CARBOplatin AUC=5      10/23/2023 -  Chemotherapy    OP CENTRAL VENOUS ACCESS DEVICE Access, Care, and Maintenance (CVAD)     2/6/2024 - 2/27/2024 Chemotherapy    OP LUNG Atezolizumab     4/4/2024 Biopsy    OP LUNG Nivolumab  480 mg  Plan Provider: Neetu Ashley MD  Treatment goal: Curative  Line of treatment: [No plan line of treatment]           REVIEW OF SYSTEMS:  A 14 point review of systems was performed and is negative except as noted below.    Review of Systems - Oncology      Objective     Vitals:    04/25/24 1041 04/25/24 1102 04/25/24 1104 04/25/24 1548   BP: (!) 81/48 90/46 92/50 105/59   BP Location: Left arm Left arm Right arm Right arm   Patient Position: Lying Lying Lying Sitting   Pulse: 71 73 72 73   Resp:  18 18 18   Temp:    99.4 °F (37.4 °C)   TempSrc:    Oral   SpO2: 95% 95% 98% 98%   Weight:       Height:                       Temp:  [97.7 °F (36.5 °C)-99.4 °F (37.4 °C)] 99.4 °F (37.4 °C)     Performance Status: 2    Physical Exam    General: well appearing male in no acute distress  HEENT: sclerae anicteric, oropharynx clear  Lymphatics: no cervical, supraclavicular, or axillary adenopathy  Cardiovascular: regular rate and rhythm, no murmurs  Lungs: clear to auscultation bilaterally  Abdomen: soft, nontender, nondistended.  No palpable organomegaly  Extremities: no lower extremity edema  Skin: no rashes, lesions, bruising, or petechiae      LABS:    Lab Results   Component Value Date    HGB 7.8 (L) 04/25/2024    HCT 24.4 (L) 04/25/2024    MCV 97.2 (H) 04/25/2024     04/25/2024    WBC 45.90 (C) 04/25/2024    NEUTROABS 41.95 (H) 04/25/2024    LYMPHSABS 1.22 04/25/2024    MONOSABS 1.83 (H) 04/25/2024    EOSABS 0.03 04/25/2024    BASOSABS 0.10 04/25/2024     Lab Results   Component Value Date    GLUCOSE 93 04/25/2024    BUN 22 04/25/2024    CREATININE 3.22 (H) 04/25/2024     04/25/2024    K 3.2 (L) 04/25/2024     (H) 04/25/2024    CO2 15.0 (L) 04/25/2024    CALCIUM 7.5 (L) 04/25/2024    PROTEINTOT 5.8 (L) 04/25/2024    ALBUMIN 2.8 (L) 04/25/2024    BILITOT 0.3 04/25/2024    ALKPHOS 76 04/25/2024    AST 12 04/25/2024    ALT 10 04/25/2024         IMAGING    CT Abdomen Pelvis Without  Contrast    Result Date: 4/24/2024  CT ABDOMEN PELVIS WO CONTRAST Date of Exam: 4/24/2024 12:13 PM EDT Indication: Sepsis, source unclear. Comparison: 4/12/2024 Technique: Axial CT images were obtained of the abdomen and pelvis without the administration of contrast. Reconstructed coronal and sagittal images were also obtained. Automated exposure control and iterative construction methods were used. Findings: Included lung bases are unchanged in appearance from prior CT. No new abnormality. No suspicious hepatic lesion. Gallbladder is unremarkable. No significant biliary ductal dilation. The spleen, pancreas, and bilateral adrenal glands are unchanged. There is significantly decreased perinephric fat stranding about the bilateral kidneys. There is no evidence of hydronephrosis or nephrolithiasis. No suspicious renal lesion. Small hiatal hernia. No bowel obstruction. Normal appendix. A few scattered colonic diverticuli without evidence of diverticulitis. No suspicious lymphadenopathy. Dense atherosclerotic calcifications of the aorta and branch vessels. Decreased urinary bladder wall thickening which may be in part secondary to increased distention. Prostatomegaly. Abdominal and pelvic wall soft tissues show no acute abnormality. There is no acute fracture or suspicious osseous lesion.     Impression: No new discrete abnormality of the abdomen or pelvis as compared to prior CT. Significantly decreased perinephric fat stranding about the bilateral kidneys. Decreased urinary bladder wall thickening which may be in part secondary to increased distention compared to prior exam. Electronically Signed: Raulito Light MD  4/24/2024 12:47 PM EDT  Workstation ID: [id number]    CT Chest Without Contrast Diagnostic    Result Date: 4/24/2024  CT CHEST WO CONTRAST DIAGNOSTIC Date of Exam: 4/24/2024 12:13 PM EDT Indication: Cough, sepsis, unclear source. Comparison: CT chest 4/12/2024 Technique: Axial CT images were obtained of the  chest without contrast administration.  Reconstructed coronal and sagittal images were also obtained. Automated exposure control and iterative construction methods were used. Findings: MEDIASTINUM: The heart size is stable. Trace pericardial fluid. Right chest wall AICD. Left chest wall infusion port with tip in the SVC. CORONARY ARTERIES: There is calcified atherosclerotic disease. LUNGS: There are postsurgical changes of the right lower lung with inferomedial scarring posteriorly. There is a loculated right pleural effusion tracking into the fissure, similar to the previous study. There is diffuse emphysema with biapical scarring. LYMPH NODES: Mildly prominent mediastinal lymph nodes are unchanged. There are small calcified subcarinal and left hilar nodes. OSSEOUS STRUCTURES: Multilevel thoracic degenerative disc disease. Stable healing right-sided rib fractures. No acute osseous abnormality.     Impression: 1. Stable postsurgical changes of the right lower lung with scarring and chronic appearing loculated right pleural effusion. 2. Mild emphysema and biapical scarring. Electronically Signed: Aimee Freitas MD  4/24/2024 12:37 PM EDT  Workstation ID: [id number]    XR Chest 1 View    Result Date: 4/24/2024  XR CHEST 1 VW Date of Exam: 4/24/2024 10:15 AM EDT Indication: Sepsis, cough Comparison: 4/12/2024 Findings: Cardiomediastinal silhouette is stable. There are postsurgical changes of the right lung with a small chronic effusion. Left lung is clear. Left-sided port catheter with the tip in the SVC. Right AICD. No acute osseous abnormality identified.     Impression: Stable chronic appearance of the chest. No acute cardiopulmonary abnormality. Electronically Signed: Aimee Freitas MD  4/24/2024 10:37 AM EDT  Workstation ID: [id number]    CT Chest Without Contrast Diagnostic    Result Date: 4/12/2024  CT ABDOMEN PELVIS WO CONTRAST, CT CHEST WO CONTRAST DIAGNOSTIC Date of Exam: 4/12/2024 8:00 PM EDT Indication: Sepsis.  Comparison: 3/28/2024 PET/CT; Technique: Axial CT images were obtained of the chest, abdomen and pelvis without the administration of contrast. Reconstructed coronal and sagittal images were also obtained. Automated exposure control and iterative construction methods were used. Findings: CT chest: The central airways are patent. Redemonstrated postsurgical changes of the right lung with medial inferior scarring and small adjacent loculated pleural effusion. There is background emphysematous changes with biapical pleural-parenchymal scarring. Scattered calcified granulomas. No suspicious pulmonary nodule or mass. No focal airspace consolidation. Right chest wall cardiac device distal lead tips in unchanged position. Left chest wall port with distal lead tip overlying the superior vena cava. The heart is unchanged in size. Coronary artery calcifications. No pericardial effusion. No mediastinal mass. Prominent mediastinal lymph nodes. Prominent right supraclavicular lymph node. Chest wall soft tissues show no acute abnormality. CT abdomen/pelvis: There is no suspicious hepatic lesion. The gallbladder is unremarkable. No significant biliary ductal dilation. The spleen, pancreas, and bilateral adrenal glands are unchanged. As compared to 3/28/2024 PET/CT, there is edematous appearance of the bilateral kidneys with extensive bilateral perinephric fat stranding. No hydronephrosis or nephrolithiasis. Small hiatal hernia. No evidence of bowel obstruction. No suspicious abdominal or pelvic lymphadenopathy. No abdominal aortic aneurysm. Atherosclerotic calcifications of the aorta and branch vessels. There is circumferential wall thickening of the urinary bladder. The prostate is enlarged with prostatic calcifications. Abdominal and pelvic wall soft tissues show no acute abnormality. Small bilateral fat-containing inguinal hernias. Redemonstrated right lateral fifth and sixth rib fractures which are healing. There is no  evidence of acute fracture or suspicious osseous lesion.     Impression: Findings concerning for bilateral pyelonephritis. Circumferential wall thickening of the urinary bladder which can be seen with cystitis. No evidence of abscess formation, hydronephrosis, or other complication. Additional chronic findings as above including surgical changes of the right lung and healing right lateral fifth and sixth rib fractures. Small hiatal hernia. Previously seen mildly prominent hypermetabolic mediastinal and right supraclavicular lymph nodes are unchanged. Electronically Signed: Raulito Light MD  4/12/2024 8:22 PM EDT  Workstation ID: [id number]    CT Abdomen Pelvis Without Contrast    Result Date: 4/12/2024  CT ABDOMEN PELVIS WO CONTRAST, CT CHEST WO CONTRAST DIAGNOSTIC Date of Exam: 4/12/2024 8:00 PM EDT Indication: Sepsis. Comparison: 3/28/2024 PET/CT; Technique: Axial CT images were obtained of the chest, abdomen and pelvis without the administration of contrast. Reconstructed coronal and sagittal images were also obtained. Automated exposure control and iterative construction methods were used. Findings: CT chest: The central airways are patent. Redemonstrated postsurgical changes of the right lung with medial inferior scarring and small adjacent loculated pleural effusion. There is background emphysematous changes with biapical pleural-parenchymal scarring. Scattered calcified granulomas. No suspicious pulmonary nodule or mass. No focal airspace consolidation. Right chest wall cardiac device distal lead tips in unchanged position. Left chest wall port with distal lead tip overlying the superior vena cava. The heart is unchanged in size. Coronary artery calcifications. No pericardial effusion. No mediastinal mass. Prominent mediastinal lymph nodes. Prominent right supraclavicular lymph node. Chest wall soft tissues show no acute abnormality. CT abdomen/pelvis: There is no suspicious hepatic lesion. The gallbladder is  unremarkable. No significant biliary ductal dilation. The spleen, pancreas, and bilateral adrenal glands are unchanged. As compared to 3/28/2024 PET/CT, there is edematous appearance of the bilateral kidneys with extensive bilateral perinephric fat stranding. No hydronephrosis or nephrolithiasis. Small hiatal hernia. No evidence of bowel obstruction. No suspicious abdominal or pelvic lymphadenopathy. No abdominal aortic aneurysm. Atherosclerotic calcifications of the aorta and branch vessels. There is circumferential wall thickening of the urinary bladder. The prostate is enlarged with prostatic calcifications. Abdominal and pelvic wall soft tissues show no acute abnormality. Small bilateral fat-containing inguinal hernias. Redemonstrated right lateral fifth and sixth rib fractures which are healing. There is no evidence of acute fracture or suspicious osseous lesion.     Impression: Findings concerning for bilateral pyelonephritis. Circumferential wall thickening of the urinary bladder which can be seen with cystitis. No evidence of abscess formation, hydronephrosis, or other complication. Additional chronic findings as above including surgical changes of the right lung and healing right lateral fifth and sixth rib fractures. Small hiatal hernia. Previously seen mildly prominent hypermetabolic mediastinal and right supraclavicular lymph nodes are unchanged. Electronically Signed: Raulito Light MD  4/12/2024 8:22 PM EDT  Workstation ID: [id number]    XR Chest 1 View    Result Date: 4/12/2024  XR CHEST 1 VW Date of Exam: 4/12/2024 6:56 PM EDT Indication: Weak/Dizzy/AMS triage protocol Comparison: 2/21/2024 CT Findings: Right chest wall cardiac device distal lead tips in unchanged position. Left chest wall port with distal lead tip overlying the superior vena cava. There is a chronic small right-sided pleural effusion. No evidence of left pleural effusion. No new focal airspace consolidation. No pneumothorax. No acute  osseous abnormality.     Impression: No acute cardiopulmonary abnormality. Chronic findings as above. Electronically Signed: Raulito Light MD  4/12/2024 7:26 PM EDT  Workstation ID: [id number]    NM PET/CT Skull Base to Mid Thigh    Result Date: 3/29/2024  NM PET/CT SKULL BASE TO MID THIGH Date of Exam: 3/28/2024 11:45 AM EDT Indication: lung cancer. Comparison: None available. Technique: 13.05 mCi of F-18 FDG was administered intravenously. PET imaging was obtained from skull base to mid-thigh approximately 60 minutes after radiotracer injection. A low dose non contrast CT was obtained for attenuation correction and anatomic localization. Fused PET-CT and 3D MIP reconstructions were utilized for image interpretation.  Fasting blood glucose level: 97 mg/dl. Reference uptake values: Mediastinum: 2.4 SUVmax Liver: 3.0 SUVmax Normalization method: Body Weight Findings: Head and neck: There is a 1 cm right supraclavicular lymph node with mild increased FDG activity and an SUV maximum of 3.1. Chest: Prominent right paratracheal lymph nodes with an SUV maximum of 3.5. There is mildly prominent left paratracheal lymph nodes with an SUV max of 3.1. Nonenlarged hilar lymph nodes are identified. Postsurgical changes in the right chest there is interval development of a loculated right effusion. There are changes of emphysema. Abdomen and pelvis: No abnormal FDG activity identified. Musculoskeletal: Postsurgical changes within the right chest. There are fractures of the fifth and sixth right lateral ribs, likely from recent surgery. There is mild uptake within the soft tissues of the right back. No aggressive appearing lytic or sclerotic bone lesions.     Impression: 1. Slight increase in prominence of mediastinal and right supraclavicular lymph nodes with mild activity.. This could be reactive from recent surgery. Recommend attention on follow-up imaging. The right supraclavicular lymph nodes may be amenable to percutaneous  ultrasound-guided biopsy if needed. Electronically Signed: Micha Millan MD  3/29/2024 4:46 PM EDT  Workstation ID: [id number]      ASSESSMENT/PLAN:  Severe leukocytosis  Acute on chronic anemia    -I reviewed the patient's extensive recent course including discharge summary, nephrology notes, infectious disease notes, and multiple serial culture results, blood counts, and CMP's. The patient was recently admitted with ARACELI in the context of bilateral pyelonephrosis and clinically improved with IV antibiotics.  He had a marked leukocytosis that admission with resolution of leukocytosis with IV antibiotics.  Specifically, his white blood cell count was normal on 3/27/2024, increased to 12.6 on 4/4/2024 and was elevated at 36 on 4/12 at the time of his original admission.  It peaked at 46,000 with an absolute neutrophilic predominance.  I note he had no eosinophilia.  His white blood cell count downtrended quickly and had normalized for the last 3 days of his hospitalization with a white blood cell count of 10.  On presentation this admission his white blood cell count is 39 again with neutrophilic predominance and increased to 45 today which is hospital day 2.  He has no peripheral blood eosinophilia or lymphocytosis.  He has a mild persistent anemia ranging from 10.5-11.2 prior to his acute hospital stay.  Last admission his anemia acutely worsened to a javan hemoglobin of 7.6.  It is 7.8 today.    -Complete resolution of leukocytosis with IV antibiotics is most compatible with reactive/infectious etiology.  He is back on broad-spectrum antibiotics.  Infectious workup is thus far negative.  Agree with trending his blood counts over the coming days and considering further investigation to rule out endocarditis or port infection as is ongoing per infectious disease given his recent relapse after discontinuing IV antibiotics.  Although his anemia had previously failed to completely resolved following chemotherapy, surgery,  and pneumonia, noninvasive workup was relatively unremarkable in March and at that time patient had elected for serial observation of her bone marrow biopsy.  I think his acute recent worsening of anemia is most likely secondary to bone marrow suppression and renal failure.  I would recommend continuing to trend his blood counts.  If his leukocytosis fails to improve with antibiotics or if his anemia worsens/persists, we can consider bone marrow biopsy early next week.  He would prefer to avoid this if possible.    Recurrent hypotension  4.  Non-small cell lung carcinoma on recent immunotherapy  -Further immunotherapy on hold.  So far no convincing evidence of immunotherapy induced adverse event to explain his symptoms.  I would recommend screening for endocrinopathies.  -Normal TSH 1.15 on 4/12/2024.  -Check free T4, cortisol, ACTH, LH, testosterone with AML  -CK was low    4.  ARACELI in the context of bilateral pyelonephritis, now resolving  -His baseline creatinine was normal through 3/27/2024.  He resumed immunotherapy with Opdivo on 4/4/2024 and at that time his creatinine was 2.  Retrospectively, he reports that he had had an acute diarrheal illness with low-grade temperatures the weekend prior, but did not report the symptoms prior to his Opdivo.  He reports his symptoms have resolved but he had not fully recovered his p.o. intake.  His creatinine was improved by the time of follow-up on 410 but remained elevated above his baseline at 1.5.  On presentation on 412 his creatinine was 2.2 and peaked at 8.3 x 417.  It had improved to 4.1 at the time of discharge and has continued to improve this admission, now 3.2.  -There are rare cases of immunotherapy induced acute renal failure, but the patient clinically improved with antibiotics, had no urine eosinophils, and nephrology did not pursue renal biopsy.  Given that he is clinically improving without steroids this seems unlikely.    -Will follow cris.  Neetu Ashley,  MD    Time spent 80 minutes in review of extensive records and labs, assessment of pt, placing orders, documentation  4/25/2024

## 2024-06-11 DIAGNOSIS — F41.9 ANXIETY: ICD-10-CM

## 2024-06-11 RX ORDER — DULOXETIN HYDROCHLORIDE 30 MG/1
CAPSULE, DELAYED RELEASE ORAL DAILY
Qty: 30 CAPSULE | Refills: 0 | Status: SHIPPED | OUTPATIENT
Start: 2024-06-11

## 2024-06-20 ENCOUNTER — HOSPITAL ENCOUNTER (OUTPATIENT)
Facility: HOSPITAL | Age: 54
Setting detail: SPECIMEN
Discharge: HOME | End: 2024-06-23

## 2024-06-20 LAB
ESTIMATED AVERAGE GLUCOSE: 172 MG/DL (ref 91–123)
HBA1C MFR BLD: 7.6 % (ref 4.8–5.6)
SENTARA SPECIMEN COLLECTION: NORMAL

## 2024-06-20 PROCEDURE — 99001 SPECIMEN HANDLING PT-LAB: CPT

## 2024-06-26 ENCOUNTER — OFFICE VISIT (OUTPATIENT)
Facility: CLINIC | Age: 54
End: 2024-06-26
Payer: COMMERCIAL

## 2024-06-26 VITALS
SYSTOLIC BLOOD PRESSURE: 130 MMHG | WEIGHT: 277 LBS | TEMPERATURE: 98.2 F | HEIGHT: 70 IN | HEART RATE: 79 BPM | OXYGEN SATURATION: 96 % | BODY MASS INDEX: 39.65 KG/M2 | DIASTOLIC BLOOD PRESSURE: 86 MMHG

## 2024-06-26 DIAGNOSIS — F41.9 ANXIETY: ICD-10-CM

## 2024-06-26 DIAGNOSIS — I10 ESSENTIAL HYPERTENSION: ICD-10-CM

## 2024-06-26 DIAGNOSIS — E11.9 TYPE 2 DIABETES MELLITUS WITHOUT COMPLICATION, WITHOUT LONG-TERM CURRENT USE OF INSULIN (HCC): Primary | ICD-10-CM

## 2024-06-26 DIAGNOSIS — Z12.5 SPECIAL SCREENING FOR MALIGNANT NEOPLASM OF PROSTATE: ICD-10-CM

## 2024-06-26 DIAGNOSIS — E78.2 MIXED HYPERLIPIDEMIA: ICD-10-CM

## 2024-06-26 PROCEDURE — 3075F SYST BP GE 130 - 139MM HG: CPT | Performed by: NURSE PRACTITIONER

## 2024-06-26 PROCEDURE — 3051F HG A1C>EQUAL 7.0%<8.0%: CPT | Performed by: NURSE PRACTITIONER

## 2024-06-26 PROCEDURE — 3079F DIAST BP 80-89 MM HG: CPT | Performed by: NURSE PRACTITIONER

## 2024-06-26 PROCEDURE — 99214 OFFICE O/P EST MOD 30 MIN: CPT | Performed by: NURSE PRACTITIONER

## 2024-06-26 RX ORDER — SPIRONOLACTONE 25 MG/1
25 TABLET ORAL DAILY
Qty: 90 TABLET | Refills: 0 | Status: SHIPPED | OUTPATIENT
Start: 2024-06-26

## 2024-06-26 RX ORDER — ATORVASTATIN CALCIUM 80 MG/1
80 TABLET, FILM COATED ORAL DAILY
Qty: 90 TABLET | Refills: 0 | Status: SHIPPED | OUTPATIENT
Start: 2024-06-26

## 2024-06-26 RX ORDER — DULOXETIN HYDROCHLORIDE 30 MG/1
30 CAPSULE, DELAYED RELEASE ORAL DAILY
Qty: 90 CAPSULE | Refills: 0 | Status: SHIPPED | OUTPATIENT
Start: 2024-06-26

## 2024-06-26 NOTE — PROGRESS NOTES
[facility] [address]  [address]  [place], [place] [address]  [phone] office/[phone] fax    6/26/2024    Lincoln Freeman Jr 1970 is a pleasant White (non-) male.       History of Present Illness  The patient presents for evaluation of multiple medical concerns.    The patient's most recent A1c test indicated a significant elevation, despite no changes in his dietary habits or lifestyle. He expresses uncertainty regarding the necessity to monitor his blood sugar levels. His fasting blood glucose level was recorded at 170.    The patient reports experiencing anxiety but states Cymbalta 30 mg is effective for his anxiety.     His father has diabetes and is on insulin pump.      Physical Exam      Physical Exam  Constitutional:       Appearance: Normal appearance. He is obese.   Eyes:      Extraocular Movements: Extraocular movements intact.      Conjunctiva/sclera: Conjunctivae normal.   Cardiovascular:      Rate and Rhythm: Normal rate and regular rhythm.      Heart sounds: Normal heart sounds.   Pulmonary:      Effort: Pulmonary effort is normal.      Breath sounds: Normal breath sounds.   Musculoskeletal:         General: Normal range of motion.   Skin:     General: Skin is dry.   Neurological:      Mental Status: He is alert and oriented to person, place, and time.   Psychiatric:      Comments: Patient notably sent about his elevated A1c         Vitals:    06/26/24 1417   BP: 130/86   Site: Right Upper Arm   Position: Sitting   Cuff Size: Large Adult   Pulse: 79   Temp: 98.2 °F (36.8 °C)   TempSrc: Temporal   SpO2: 96%   Weight: 125.6 kg (277 lb)   Height: 1.778 m (5' 10\")       Body mass index is 39.75 kg/m².     Results  Laboratory Studies  A1c increased from 6.5 to 7.6.    Assessment & Plan    1. Type 2 diabetes mellitus without complication, without long-term current use of insulin ([facility])  Assessment & Plan:  A1c 6.5-7.6  Recheck level 3 months  Discussed

## 2024-06-26 NOTE — ASSESSMENT & PLAN NOTE
A1c 6.5-7.6  Recheck level 3 months  Discussed possible treatment options  If A1c does not decline back under 7 in 3 months, will need to initiate medication therapy.  Patient was surprised to see this number had elevated as he has not changed his diet  He has lost 5 pounds in 6 months  He is not happy about these numbers as he is not willing to start treatment  Provided patient with diabetic education booklet  Encourage patient to American diabetes Association website  Need to reduce carbs and sweets and increase exercise to lose weight  Due to patient's history of severe anxiety, and plan to reassess his A1c in 3 months, strongly encouraged patient not to check his blood sugars as this may increase his anxiety.    12/4/23:  Pt discovered dad has insulin pump  Discussed proper diet and exercise  Adore level 6m  Discussed tx options is A1c >7 ie metformin, glipizide, GLP1.      9/12/23:  A1c creeping up  A1c 6-6.5  Discussed treatment if A1c >7.

## 2024-08-14 DIAGNOSIS — I10 ESSENTIAL HYPERTENSION: ICD-10-CM

## 2024-08-16 RX ORDER — CARVEDILOL 12.5 MG/1
TABLET ORAL
Qty: 270 TABLET | Refills: 2 | Status: SHIPPED | OUTPATIENT
Start: 2024-08-16

## 2024-09-07 DIAGNOSIS — I25.83 CORONARY ARTERY DISEASE DUE TO LIPID RICH PLAQUE: ICD-10-CM

## 2024-09-07 DIAGNOSIS — I25.10 CORONARY ARTERY DISEASE DUE TO LIPID RICH PLAQUE: ICD-10-CM

## 2024-09-09 RX ORDER — ASPIRIN 81 MG/1
81 TABLET, COATED ORAL DAILY
Qty: 100 TABLET | Refills: 3 | Status: SHIPPED | OUTPATIENT
Start: 2024-09-09

## 2024-09-10 DIAGNOSIS — I10 ESSENTIAL HYPERTENSION: ICD-10-CM

## 2024-09-10 DIAGNOSIS — E78.2 MIXED HYPERLIPIDEMIA: ICD-10-CM

## 2024-09-10 RX ORDER — ATORVASTATIN CALCIUM 80 MG/1
80 TABLET, FILM COATED ORAL DAILY
Qty: 90 TABLET | Refills: 0 | OUTPATIENT
Start: 2024-09-10

## 2024-09-10 RX ORDER — SPIRONOLACTONE 25 MG/1
25 TABLET ORAL DAILY
Qty: 90 TABLET | Refills: 0 | OUTPATIENT
Start: 2024-09-10

## 2024-09-10 NOTE — TELEPHONE ENCOUNTER
Last OV: 6/26/2024  Next OV: 9/25/2024  Last refill: 6/26/2024      Please refuse if medication will be filled at appointment.

## 2024-09-17 NOTE — PROGRESS NOTES
Millie Mcmanus presents today for   Chief Complaint   Patient presents with    Chest Pain (Angina)     TIGHTNESS/NUMBNESS for 1 WEEK AND A HALF WORSE WITH EXERTION RELIEVED WITH REST LASTS 3-5 MINUTES    Shortness of Breath     WITH EXERTION        Millie Mcmanus preferred language for health care discussion is english/other. Is someone accompanying this pt? no    Is the patient using any DME equipment during [address]? no    Depression Screening:  3 most recent PHQ Screens 1/16/2020   Little interest or pleasure in doing things Not at all   Feeling down, depressed, irritable, or hopeless Not at all   Total Score PHQ 2 0       Learning Assessment:  Learning Assessment 1/16/2020   PRIMARY LEARNER Patient   HIGHEST LEVEL OF EDUCATION - PRIMARY LEARNER  > 4 YEARS OF COLLEGE   BARRIERS PRIMARY LEARNER NONE   CO-LEARNER CAREGIVER No   PRIMARY LANGUAGE ENGLISH   LEARNER PREFERENCE PRIMARY DEMONSTRATION   ANSWERED BY patient   RELATIONSHIP SELF       Abuse Screening:  Abuse Screening Questionnaire 1/16/2020   Do you ever feel afraid of your partner? N   Are you in a relationship with someone who physically or mentally threatens you? N   Is it safe for you to go home? Y       Fall Risk  Fall Risk Assessment, last 12 mths 10/2/2020   Able to walk? Yes   Fall in past 12 months? No       Pt currently taking Anticoagulant therapy? no    Coordination of Care:  1. Have you been to the ER, urgent care clinic since your last visit? Hospitalized since your last visit? no    2. Have you seen or consulted any other health care providers outside of the [address] [place] [address] since your last visit? Include any pap smears or colon screening.  no DISPLAY PLAN FREE TEXT

## 2024-09-18 ENCOUNTER — HOSPITAL ENCOUNTER (OUTPATIENT)
Facility: HOSPITAL | Age: 54
Setting detail: SPECIMEN
Discharge: HOME | End: 2024-09-21

## 2024-09-18 LAB — SENTARA SPECIMEN COLLECTION: NORMAL

## 2024-09-18 PROCEDURE — 99001 SPECIMEN HANDLING PT-LAB: CPT

## 2024-09-19 LAB
A/G RATIO: 2.8 RATIO (ref 1.1–2.6)
ALBUMIN: 4.7 G/DL (ref 3.5–5)
ALP BLD-CCNC: 51 U/L (ref 25–115)
ALT SERPL-CCNC: 50 U/L (ref 5–40)
ANION GAP SERPL CALCULATED.3IONS-SCNC: 10 MMOL/L (ref 3–15)
AST SERPL-CCNC: 32 U/L (ref 10–37)
BASOPHILS ABSOLUTE: 0 K/UL (ref 0–0.2)
BASOPHILS RELATIVE PERCENT: 1 % (ref 0–2)
BILIRUB SERPL-MCNC: 0.8 MG/DL (ref 0.2–1.2)
BUN BLDV-MCNC: 12 MG/DL (ref 6–22)
CALCIUM SERPL-MCNC: 9.8 MG/DL (ref 8.4–10.5)
CHLORIDE BLD-SCNC: 97 MMOL/L (ref 98–110)
CHOLESTEROL, TOTAL: 165 MG/DL (ref 110–200)
CHOLESTEROL/HDL RATIO: 4.1 (ref 0–5)
CO2: 28 MMOL/L (ref 20–32)
CREAT SERPL-MCNC: 0.9 MG/DL (ref 0.5–1.2)
CREATININE URINE: 146 MG/DL
EOSINOPHIL # BLD: 9 % (ref 0–6)
EOSINOPHILS ABSOLUTE: 0.2 K/UL (ref 0–0.5)
ESTIMATED AVERAGE GLUCOSE: 174 MG/DL (ref 91–123)
GFR, ESTIMATED: >60 ML/MIN/1.73 SQ.M.
GLOBULIN: 1.7 G/DL (ref 2–4)
GLUCOSE: 186 MG/DL (ref 70–99)
HBA1C MFR BLD: 7.7 % (ref 4.8–5.6)
HCT VFR BLD CALC: 45.6 % (ref 39.3–51.6)
HDLC SERPL-MCNC: 40 MG/DL
HEMOGLOBIN: 15.2 G/DL (ref 13.1–17.2)
LDL CHOLESTEROL: 82 MG/DL (ref 50–99)
LDL/HDL RATIO: 2.1
LYMPHOCYTES # BLD: 28 % (ref 20–45)
LYMPHOCYTES ABSOLUTE: 0.7 K/UL (ref 1–4.8)
MACROCYTOSIS: ABNORMAL
MCH RBC QN AUTO: 35 PG (ref 26–34)
MCHC RBC AUTO-ENTMCNC: 33 G/DL (ref 31–36)
MCV RBC AUTO: 104 FL (ref 80–95)
MICROALB/CREAT RATIO (UG/MG CREAT.): 16.2 (ref 0–30)
MICROALBUMIN/CREAT 24H UR: 23.7 MG/L (ref 0.1–17)
MONOCYTES ABSOLUTE: 0.4 K/UL (ref 0.1–1)
MONOCYTES: 14 % (ref 3–12)
NEUTROPHILS ABSOLUTE: 1.3 K/UL (ref 1.8–7.7)
NEUTROPHILS: 48 % (ref 40–75)
NON-HDL CHOLESTEROL: 125 MG/DL
NORMACHROMIC RBC: ABNORMAL
PDW BLD-RTO: 13.2 % (ref 10–15.5)
PLATELET # BLD: 87 K/UL (ref 140–440)
PLATELET, IMMATURE FRACTION: 3.2 % (ref 1.1–7.1)
PMV BLD AUTO: 10.1 FL (ref 9–13)
POTASSIUM SERPL-SCNC: 4.5 MMOL/L (ref 3.5–5.5)
PROSTATE SPECIFIC ANTIGEN: 0.32 NG/ML
RBC # BLD: 4.4 M/UL (ref 3.8–5.8)
SMEAR REVIEW: ABNORMAL
SODIUM BLD-SCNC: 135 MMOL/L (ref 133–145)
TOTAL PROTEIN: 6.4 G/DL (ref 6.4–8.3)
TRIGL SERPL-MCNC: 212 MG/DL (ref 40–149)
VLDLC SERPL CALC-MCNC: 42 MG/DL (ref 8–30)
WBC # BLD: 2.6 K/UL (ref 4–11)

## 2024-10-01 ENCOUNTER — OFFICE VISIT (OUTPATIENT)
Facility: CLINIC | Age: 54
End: 2024-10-01

## 2024-10-01 VITALS
TEMPERATURE: 98.1 F | DIASTOLIC BLOOD PRESSURE: 86 MMHG | SYSTOLIC BLOOD PRESSURE: 128 MMHG | BODY MASS INDEX: 39.65 KG/M2 | RESPIRATION RATE: 16 BRPM | HEIGHT: 70 IN | HEART RATE: 73 BPM | WEIGHT: 277 LBS | OXYGEN SATURATION: 96 %

## 2024-10-01 DIAGNOSIS — I25.10 CORONARY ARTERY DISEASE DUE TO LIPID RICH PLAQUE: ICD-10-CM

## 2024-10-01 DIAGNOSIS — I25.83 CORONARY ARTERY DISEASE DUE TO LIPID RICH PLAQUE: ICD-10-CM

## 2024-10-01 DIAGNOSIS — R60.0 BILATERAL LOWER EXTREMITY EDEMA: ICD-10-CM

## 2024-10-01 DIAGNOSIS — E87.6 HYPOKALEMIA: ICD-10-CM

## 2024-10-01 DIAGNOSIS — F41.9 ANXIETY: ICD-10-CM

## 2024-10-01 DIAGNOSIS — D69.6 THROMBOCYTOPENIA (HCC): ICD-10-CM

## 2024-10-01 DIAGNOSIS — I10 ESSENTIAL HYPERTENSION: ICD-10-CM

## 2024-10-01 DIAGNOSIS — J30.89 ENVIRONMENTAL AND SEASONAL ALLERGIES: ICD-10-CM

## 2024-10-01 DIAGNOSIS — Z00.00 ANNUAL PHYSICAL EXAM: Primary | ICD-10-CM

## 2024-10-01 DIAGNOSIS — Z78.9 ALCOHOL CONSUMPTION OF ONE TO FOUR DRINKS PER DAY: ICD-10-CM

## 2024-10-01 DIAGNOSIS — G47.30 SLEEP APNEA, UNSPECIFIED TYPE: ICD-10-CM

## 2024-10-01 DIAGNOSIS — E66.9 OBESITY (BMI 30-39.9): ICD-10-CM

## 2024-10-01 DIAGNOSIS — E78.2 MIXED HYPERLIPIDEMIA: ICD-10-CM

## 2024-10-01 DIAGNOSIS — E11.9 TYPE 2 DIABETES MELLITUS WITHOUT COMPLICATION, WITHOUT LONG-TERM CURRENT USE OF INSULIN (HCC): ICD-10-CM

## 2024-10-01 DIAGNOSIS — Z12.11 COLON CANCER SCREENING: ICD-10-CM

## 2024-10-01 PROBLEM — L02.92 BOILS OF MULTIPLE SITES: Status: RESOLVED | Noted: 2024-03-06 | Resolved: 2024-10-01

## 2024-10-01 PROBLEM — S81.801A OPEN WOUND OF RIGHT LOWER LEG: Status: RESOLVED | Noted: 2024-03-06 | Resolved: 2024-10-01

## 2024-10-01 RX ORDER — DULOXETIN HYDROCHLORIDE 30 MG/1
30 CAPSULE, DELAYED RELEASE ORAL DAILY
Qty: 90 CAPSULE | Refills: 1 | Status: SHIPPED | OUTPATIENT
Start: 2024-10-01

## 2024-10-01 RX ORDER — SPIRONOLACTONE 25 MG/1
25 TABLET ORAL DAILY
Qty: 90 TABLET | Refills: 1 | Status: SHIPPED | OUTPATIENT
Start: 2024-10-01

## 2024-10-01 RX ORDER — ATORVASTATIN CALCIUM 80 MG/1
80 TABLET, FILM COATED ORAL DAILY
Qty: 90 TABLET | Refills: 3 | Status: SHIPPED | OUTPATIENT
Start: 2024-10-01

## 2024-10-01 NOTE — ASSESSMENT & PLAN NOTE
BMI is out of normal parameters and plan is as follows: Discussed in great detail on diet, portion control, exercise, avoiding foods high in sugar, carbs and starches, fatty/greasy foods and to eat until satisfied not til full. I have counseled this patient on diet and exercise regimens as well.

## 2024-10-01 NOTE — ASSESSMENT & PLAN NOTE
Initiate metformin 500g qpm  Discussed possible side effects  A1c 7.6-7.7  Microalbumin elevated 23  Read DM booklet provided  Adore A1c 3m    6/26/2024 ov note:  A1c 6.5-7.6  Recheck level 3 months  Discussed possible treatment options  If A1c does not decline back under 7 in 3 months, will need to initiate medication therapy.  Patient was surprised to see this number had elevated as he has not changed his diet  He has lost 5 pounds in 6 months  He is not happy about these numbers as he is not willing to start treatment  Provided patient with diabetic education booklet  Encourage patient to American diabetes Association website  Need to reduce carbs and sweets and increase exercise to lose weight  Due to patient's history of severe anxiety, and plan to reassess his A1c in 3 months, strongly encouraged patient not to check his blood sugars as this may increase his anxiety.    12/4/23:  Pt discovered dad has insulin pump  Discussed proper diet and exercise  Adore level 6m  Discussed tx options is A1c >7 ie metformin, glipizide, GLP1.      9/12/23:  A1c creeping up  A1c 6-6.5  Discussed treatment if A1c >7.

## 2024-10-01 NOTE — ASSESSMENT & PLAN NOTE
Cont Cymbalta  Cont ativan prn  Call office when refill needed    12/4/23:  Cymbalta effective. Cont therapy  Last ativan refill June 2023 for 30 tabs  Pt seeking refill of ativan to have on hand.     9/18/23:  Lexapro dc'd due to s/e  Cymbalta 30mg initiated.      9/6/2023 note:  Cont Ativan prn  Initiate Lexapro 10mg   After 2 weeks if sx not improved, may increase to 20mg  Follow up 3m

## 2024-10-01 NOTE — PROGRESS NOTES
\"Have you been to the ER, urgent care clinic since your last visit?  Hospitalized since your last visit?\"    NO    “Have you seen or consulted any other health care providers outside our system since your last visit?”    NO    “Have you had a colorectal cancer screening such as a colonoscopy/FIT/Cologuard?    NO    No colonoscopy on file  No cologuard on file  No FIT/FOBT on file   No flexible sigmoidoscopy on file     “Have you had a diabetic eye exam?”    NO     No diabetic eye exam on file          
(COREG) 12.5 MG tablet TAKE 1 AND 1/2 TABLET BY MOUTH TWICE A DAY WITH FOOD    isosorbide mononitrate (IMDUR) 60 MG extended release tablet TAKE 1 TABLET BY MOUTH EVERY DAY    LORazepam (ATIVAN) 0.5 MG tablet Take 1 tablet by mouth nightly as needed for Anxiety. Max Daily Amount: 0.5 mg    nitroGLYCERIN (NITROSTAT) 0.4 MG SL tablet Place 1 tablet under the tongue     No current facility-administered medications for this visit.       Past Surgical History:   Procedure Laterality Date    HEENT      tonsillectomy    LITHOTRIPSY  10/9/2010    Left upper 3rd ureteral calculus    ORTHOPEDIC SURGERY      Left shoulder     Allergies and Intolerances:   Allergies   Allergen Reactions    Penicillins      Other reaction(s): Not Reported This Time    Sulfa Antibiotics Other (See Comments)    Lexapro [Escitalopram Oxalate] Anxiety     Family History:   Family History   Problem Relation Age of Onset    No Known Problems Mother     Diabetes Father         insulin pump     Social History:   He  reports that he has never smoked. He has never used smokeless tobacco.   Social History     Substance and Sexual Activity   Alcohol Use Yes    Alcohol/week: 2.0 standard drinks of alcohol     Immunization History:  Immunization History   Administered Date(s) Administered    TDaP, ADACEL (age 10y-64y), BOOSTRIX (age 10y+), IM, 0.5mL 09/06/2023         Return in about 3 months (around 1/1/2025) for Diabetes, Lab not fasting (A1c).      Dr. Lisa Lagunas, CHRISTIP-C, DNP  [facility]     The patient (or guardian, if applicable) and other individuals in attendance with the patient were advised that Artificial Intelligence will be utilized during this visit to record and process the conversation to generate a clinical note. The patient (or guardian, if applicable) and other individuals in attendance at the appointment consented to the use of AI, including the recording.

## 2024-10-03 PROBLEM — J30.89 ENVIRONMENTAL AND SEASONAL ALLERGIES: Status: ACTIVE | Noted: 2024-10-03

## 2024-10-03 PROBLEM — D69.6 THROMBOCYTOPENIA (HCC): Status: ACTIVE | Noted: 2024-10-03

## 2024-10-03 NOTE — ASSESSMENT & PLAN NOTE
Dr SHER Castro  Cont statin therapy  Reduce fried fatty foods and oily foods in diet  Work on weight loss  Increase water intake

## 2024-10-03 NOTE — ASSESSMENT & PLAN NOTE
Specialist:  Dr SHER Castro    HM:  He was advised to receive the COVID-19 vaccine and Prevnar 20. A referral for a colonoscopy was provided. He was advised to apply vitamin E oil to his leg wound. His prescriptions for Cymbalta, atorvastatin, and spironolactone were renewed. A referral to a GI specialist was provided.

## 2024-10-03 NOTE — ASSESSMENT & PLAN NOTE
According to patient this is a chronic issue from many years ago.      His platelet count remains low, with a decrease from 112 in 2020 to 87 currently. He has not seen a hematologist for this issue.  Patient is not seeking referral to hematologist at this time as he is asymptomatic.    He was advised to monitor for any excessive bleeding and to consider reducing alcohol consumption.

## 2024-10-03 NOTE — ASSESSMENT & PLAN NOTE
His CBC shows a slight elevation in eosinophils, likely due to allergies. He was advised to continue managing his allergies as usual.

## 2024-10-03 NOTE — ASSESSMENT & PLAN NOTE
Unable to tolerate CPAP  Recommended referral to neuro for consult  Discussed other options ie mouth piece instead of face mask  Patient not seeking referral to sleep at this time

## 2024-10-03 NOTE — ASSESSMENT & PLAN NOTE
Chronic, at goal (stable), continue current treatment plan  Continue spironolactone 25 mg daily  Elevate lower extremities above heart  Avoid sitting with legs dependent more than 30 minutes at a time  Increase walking exercise  Wear compression socks while awake (15 to 30 mmHg)  Limit sodium in diet  Reduce/Avoid pork  Increase water intake

## 2024-10-03 NOTE — ASSESSMENT & PLAN NOTE
Chronic, worsening (exacerbation), continue current treatment plan  -212; LDL 78-82  Continue atorvastatin 80 mg daily  Reduce alcohol content  If TG levels continue to rise, may need to initiate Zetia  Recheck level 12 months    Reduce fried fatty or oily foods in diet  Limit red meats, processed foods, and alcohol.    Limit fast food  Increase physical activity to 60 minutes of moderate intensity aerobic exercise per week.  Increase water intake  Reduce alcohol intake    How to raise HDL if low:  Consume oats, beans, legumes, olive oil, whole grains, nuts, seeds, fatty fish, and berries.  Lose weight/fat  Reduce alcohol consumption  Smoking cessation

## 2024-10-03 NOTE — ASSESSMENT & PLAN NOTE
Most likely cause for low serum globulin  Discussed need to reduce intake of alcohol  He continues to consume 2-4 beers daily

## 2024-10-03 NOTE — ASSESSMENT & PLAN NOTE
Chronic, at goal (stable), continue current treatment plan  Dr. IONA Castro  Continue carvedilol 12.5 mg twice daily  Continue Imdur 60 mg daily  Continue spironolactone 25 mg daily  CMP WNL    Limit sodium in diet to 2g/d  Initiate cardio exercise  Weight reduction  Increase water intake  Check BP and report if 3 consecutive readings greater than 139/89 to office

## 2024-11-02 PROBLEM — Z00.00 ANNUAL PHYSICAL EXAM: Status: RESOLVED | Noted: 2021-08-31 | Resolved: 2024-11-02

## 2024-11-18 ENCOUNTER — OFFICE VISIT (OUTPATIENT)
Age: 54
End: 2024-11-18
Payer: COMMERCIAL

## 2024-11-18 VITALS
DIASTOLIC BLOOD PRESSURE: 70 MMHG | HEIGHT: 70 IN | OXYGEN SATURATION: 97 % | SYSTOLIC BLOOD PRESSURE: 118 MMHG | HEART RATE: 71 BPM | BODY MASS INDEX: 39.37 KG/M2 | WEIGHT: 275 LBS

## 2024-11-18 DIAGNOSIS — E78.2 MIXED HYPERLIPIDEMIA: ICD-10-CM

## 2024-11-18 DIAGNOSIS — I25.83 CORONARY ARTERY DISEASE DUE TO LIPID RICH PLAQUE: Primary | ICD-10-CM

## 2024-11-18 DIAGNOSIS — I25.10 CORONARY ARTERY DISEASE DUE TO LIPID RICH PLAQUE: Primary | ICD-10-CM

## 2024-11-18 DIAGNOSIS — I10 ESSENTIAL HYPERTENSION: ICD-10-CM

## 2024-11-18 PROCEDURE — 3078F DIAST BP <80 MM HG: CPT | Performed by: PHYSICIAN ASSISTANT

## 2024-11-18 PROCEDURE — 99214 OFFICE O/P EST MOD 30 MIN: CPT | Performed by: PHYSICIAN ASSISTANT

## 2024-11-18 PROCEDURE — 3074F SYST BP LT 130 MM HG: CPT | Performed by: PHYSICIAN ASSISTANT

## 2024-11-18 NOTE — PROGRESS NOTES
discovered dad has insulin pump  Discussed proper diet and exercise  Adore level 6m  Discussed tx options is A1c >7 ie metformin, glipizide, GLP1.      9/12/23:  A1c creeping up  A1c 6-6.5  Discussed treatment if A1c >7.       Venous insufficiency of both lower extremities 01/15/2018       Past Surgical History:   Procedure Laterality Date    HEENT      tonsillectomy    LITHOTRIPSY  10/9/2010    Left upper 3rd ureteral calculus    ORTHOPEDIC SURGERY      Left shoulder       Current Outpatient Medications   Medication Sig    atorvastatin (LIPITOR) 80 MG tablet Take 1 tablet by mouth daily    DULoxetine (CYMBALTA) 30 MG extended release capsule Take 1 capsule by mouth daily    spironolactone (ALDACTONE) 25 MG tablet Take 1 tablet by mouth daily    metFORMIN (GLUCOPHAGE) 500 MG tablet Take 1 tablet by mouth Daily with supper    aspirin (ASPIRIN LOW DOSE) 81 MG EC tablet TAKE 1 TABLET BY MOUTH EVERY DAY    carvedilol (COREG) 12.5 MG tablet TAKE 1 AND 1/2 TABLET BY MOUTH TWICE A DAY WITH FOOD    isosorbide mononitrate (IMDUR) 60 MG extended release tablet TAKE 1 TABLET BY MOUTH EVERY DAY    LORazepam (ATIVAN) 0.5 MG tablet Take 1 tablet by mouth nightly as needed for Anxiety. Max Daily Amount: 0.5 mg    nitroGLYCERIN (NITROSTAT) 0.4 MG SL tablet Place 1 tablet under the tongue     No current facility-administered medications for this visit.       Allergies and Sensitivities:  Allergies   Allergen Reactions    Penicillins      Other reaction(s): Not Reported This Time    Sulfa Antibiotics Other (See Comments)    Lexapro [Escitalopram Oxalate] Anxiety       Family History:  Family History   Problem Relation Age of Onset    No Known Problems Mother     Diabetes Father         insulin pump       Social History:  Social History     Tobacco Use    Smoking status: Never    Smokeless tobacco: Never   Substance Use Topics    Alcohol use: Yes     Alcohol/week: 2.0 standard drinks of alcohol    Drug use: No     He  reports that he

## 2024-11-18 NOTE — PATIENT INSTRUCTIONS
Patient Education        Learning About the Mediterranean Diet  What is the Mediterranean diet?     The Mediterranean diet is a style of eating rather than a diet plan. It features foods eaten in [place], [place], [place], and other countries along the [place] Sea. It emphasizes eating foods like fish, fruits, vegetables, beans, high-fiber breads and whole grains, nuts, and olive oil. This style of eating includes limited red meat, cheese, and sweets.  Why choose the Mediterranean diet?  A Mediterranean-style diet may improve heart health. It contains more fat than other heart-healthy diets. But the fats are mainly from nuts, unsaturated oils (such as fish oils and olive oil), and certain nut or seed oils (such as canola, soybean, or flaxseed oil). These fats may help protect the heart and blood vessels.  How can you get started on the Mediterranean diet?  Here are some things you can do to switch to a more Mediterranean way of eating.  What to eat  Eat a variety of fruits and vegetables each day, such as grapes, blueberries, tomatoes, broccoli, peppers, figs, olives, spinach, eggplant, beans, lentils, and chickpeas.  Eat a variety of whole-grain foods each day, such as oats, brown rice, and whole wheat bread, pasta, and couscous.  Eat fish at least 2 times a week. Try tuna, salmon, mackerel, lake trout, herring, or sardines.  Eat moderate amounts of low-fat dairy products, such as milk, cheese, or yogurt.  Eat moderate amounts of poultry and eggs.  Choose healthy (unsaturated) fats, such as nuts, olive oil, and certain nut or seed oils like canola, soybean, and flaxseed.  Limit unhealthy (saturated) fats, such as butter, palm oil, and coconut oil. And limit fats found in animal products, such as meat and dairy products made with whole milk. Try to eat red meat only a few times a month in very small amounts.  Limit sweets and desserts to only a few times a week. This includes sugar-sweetened

## 2024-12-26 DIAGNOSIS — E11.9 TYPE 2 DIABETES MELLITUS WITHOUT COMPLICATION, WITHOUT LONG-TERM CURRENT USE OF INSULIN (HCC): ICD-10-CM

## 2024-12-27 NOTE — TELEPHONE ENCOUNTER
PCP: Lisa Lagunas DNP    LAST OFFICE VISIT: 10/01/2024    LAST REFILL PER CHART:  Medication:metFORMIN (GLUCOPHAGE) 500 MG tablet   Ordered On:10/01/2024  Instructions:Take 1 tablet by mouth Daily with supper   Dispense:90 tablets  Refills:0      Future Appointments   Date Time Provider Department Center   1/7/2025  1:00 PM [facility] LAB VISIT [facility] DEP   1/14/2025  1:15 PM Lisa Lagunas DNP [facility] DEP   9/15/2025  2:45 PM Julian Castro MD [facility] BS AMB

## 2025-01-07 ENCOUNTER — HOSPITAL ENCOUNTER (OUTPATIENT)
Facility: HOSPITAL | Age: 55
Setting detail: SPECIMEN
Discharge: HOME | End: 2025-01-10

## 2025-01-07 LAB — SENTARA SPECIMEN COLLECTION: NORMAL

## 2025-01-07 PROCEDURE — 99001 SPECIMEN HANDLING PT-LAB: CPT

## 2025-01-08 DIAGNOSIS — I10 ESSENTIAL HYPERTENSION: ICD-10-CM

## 2025-01-08 LAB
ESTIMATED AVERAGE GLUCOSE: 190 MG/DL (ref 91–123)
HBA1C MFR BLD: 8.2 % (ref 4.8–5.6)

## 2025-01-09 RX ORDER — CARVEDILOL 12.5 MG/1
TABLET ORAL
Qty: 270 TABLET | Refills: 2 | Status: SHIPPED | OUTPATIENT
Start: 2025-01-09

## 2025-01-14 ENCOUNTER — OFFICE VISIT (OUTPATIENT)
Facility: CLINIC | Age: 55
End: 2025-01-14
Payer: COMMERCIAL

## 2025-01-14 VITALS
TEMPERATURE: 98.2 F | RESPIRATION RATE: 17 BRPM | HEIGHT: 70 IN | OXYGEN SATURATION: 97 % | SYSTOLIC BLOOD PRESSURE: 118 MMHG | BODY MASS INDEX: 39.54 KG/M2 | DIASTOLIC BLOOD PRESSURE: 77 MMHG | HEART RATE: 78 BPM | WEIGHT: 276.2 LBS

## 2025-01-14 DIAGNOSIS — E11.65 UNCONTROLLED TYPE 2 DIABETES MELLITUS WITH HYPERGLYCEMIA (HCC): Primary | ICD-10-CM

## 2025-01-14 PROCEDURE — 3074F SYST BP LT 130 MM HG: CPT | Performed by: NURSE PRACTITIONER

## 2025-01-14 PROCEDURE — 99214 OFFICE O/P EST MOD 30 MIN: CPT | Performed by: NURSE PRACTITIONER

## 2025-01-14 PROCEDURE — 3078F DIAST BP <80 MM HG: CPT | Performed by: NURSE PRACTITIONER

## 2025-01-14 PROCEDURE — 3052F HG A1C>EQUAL 8.0%<EQUAL 9.0%: CPT | Performed by: NURSE PRACTITIONER

## 2025-01-14 SDOH — ECONOMIC STABILITY: FOOD INSECURITY: WITHIN THE PAST 12 MONTHS, YOU WORRIED THAT YOUR FOOD WOULD RUN OUT BEFORE YOU GOT MONEY TO BUY MORE.: NEVER TRUE

## 2025-01-14 SDOH — ECONOMIC STABILITY: FOOD INSECURITY: WITHIN THE PAST 12 MONTHS, THE FOOD YOU BOUGHT JUST DIDN'T LAST AND YOU DIDN'T HAVE MONEY TO GET MORE.: NEVER TRUE

## 2025-01-14 ASSESSMENT — PATIENT HEALTH QUESTIONNAIRE - PHQ9
SUM OF ALL RESPONSES TO PHQ QUESTIONS 1-9: 0
1. LITTLE INTEREST OR PLEASURE IN DOING THINGS: NOT AT ALL
SUM OF ALL RESPONSES TO PHQ9 QUESTIONS 1 & 2: 0
SUM OF ALL RESPONSES TO PHQ QUESTIONS 1-9: 0
2. FEELING DOWN, DEPRESSED OR HOPELESS: NOT AT ALL

## 2025-01-14 NOTE — PROGRESS NOTES
Lincoln Freeman Jr presents today for   Chief Complaint   Patient presents with    Follow-up     3 mon f/u           \"Have you been to the ER, urgent care clinic since your last visit?  Hospitalized since your last visit?\"    NO    “Have you seen or consulted any other health care providers outside of [facility] since your last visit?”    NO    “Have you had a colorectal cancer screening such as a colonoscopy/FIT/Cologuard?    YES - Type: Colonoscopy - Where: [facility] Digestive Nurse/CMA to request most recent records if not in the chart     No colonoscopy on file  No cologuard on file  No FIT/FOBT on file   No flexible sigmoidoscopy on file            
Paresthesias/numbness 06/13/2018    Prediabetes 02/24/2022    Primary hypertension 06/13/2018    S/P cardiac cath 11/2020    Borderline mid LAD and mid LCx ( iFR of both lesion, not significant)     Sleep apnea     not using CPAP    Thrombocytopenia ([facility]) 10/03/2024    Type 2 diabetes mellitus with hyperglycemia, without long-term current use of insulin ([facility]) 09/12/2022    A1c creeping up A1c 6-6.5 Discussed treatment if A1c >7.     Type 2 diabetes mellitus without complication, without long-term current use of insulin ([facility]) 09/12/2022 12/4/23:  Pt discovered dad has insulin pump  Discussed proper diet and exercise  Adore level 6m  Discussed tx options is A1c >7 ie metformin, glipizide, GLP1.      9/12/23:  A1c creeping up  A1c 6-6.5  Discussed treatment if A1c >7.       Venous insufficiency of both lower extremities 01/15/2018     Current Outpatient Medications   Medication Sig    dulaglutide (TRULICITY) 0.75 MG/0.5ML SOAJ SC injection Inject 0.5 mLs into the skin every 7 days    [START ON 2/14/2025] dulaglutide (TRULICITY) 1.5 MG/0.5ML SC injection Inject 0.5 mLs into the skin every 7 days    metFORMIN (GLUCOPHAGE) 500 MG tablet Take 1 tablet by mouth Daily with supper    carvedilol (COREG) 12.5 MG tablet TAKE 1 AND 1/2 TABLET BY MOUTH TWICE A DAY WITH FOOD    atorvastatin (LIPITOR) 80 MG tablet Take 1 tablet by mouth daily    DULoxetine (CYMBALTA) 30 MG extended release capsule Take 1 capsule by mouth daily    spironolactone (ALDACTONE) 25 MG tablet Take 1 tablet by mouth daily    aspirin (ASPIRIN LOW DOSE) 81 MG EC tablet TAKE 1 TABLET BY MOUTH EVERY DAY    isosorbide mononitrate (IMDUR) 60 MG extended release tablet TAKE 1 TABLET BY MOUTH EVERY DAY    LORazepam (ATIVAN) 0.5 MG tablet Take 1 tablet by mouth nightly as needed for Anxiety. Max Daily Amount: 0.5 mg    nitroGLYCERIN (NITROSTAT) 0.4 MG SL tablet Place 1 tablet under the tongue     No current facility-administered medications for this visit.

## 2025-01-14 NOTE — ASSESSMENT & PLAN NOTE
A1c 7.7-8.2  Despite working on diet and taking metformin 500 mg nightly, his A1c continues to rise  Continue metformin 500 mg nightly  Initiate Trulicity 0.75 mg weekly x 4 weeks  Increase Trulicity to 1.5 mg weekly x 8 weeks  He has been advised to reduce his carbohydrate intake by half during the first week of Trulicity treatment to prevent nausea, a common side effect. He has also been counseled to limit his alcohol consumption to one beer per day  Discussed importance of diet and exercise  Discussed negative impact of uncontrolled diabetes to include heart attack, stroke, vision and penile issues  A1c 3 months    10/3/2024:  Initiate metformin 500g qpm  Discussed possible side effects  A1c 7.6-7.7  Microalbumin elevated 23  Read DM booklet provided  Adore A1c 3m     6/26/2024 ov note:  A1c 6.5-7.6  Recheck level 3 months  Discussed possible treatment options  If A1c does not decline back under 7 in 3 months, will need to initiate medication therapy.  Patient was surprised to see this number had elevated as he has not changed his diet  He has lost 5 pounds in 6 months  He is not happy about these numbers as he is not willing to start treatment  Provided patient with diabetic education booklet  Encourage patient to American diabetes Association website  Need to reduce carbs and sweets and increase exercise to lose weight  Due to patient's history of severe anxiety, and plan to reassess his A1c in 3 months, strongly encouraged patient not to check his blood sugars as this may increase his anxiety.     12/4/23:  Pt discovered dad has insulin pump  Discussed proper diet and exercise  Adore level 6m  Discussed tx options is A1c >7 ie metformin, glipizide, GLP1.      9/12/23:  A1c creeping up  A1c 6-6.5  Discussed treatment if A1c >7.

## 2025-03-30 DIAGNOSIS — F41.9 ANXIETY: ICD-10-CM

## 2025-03-31 RX ORDER — DULOXETIN HYDROCHLORIDE 30 MG/1
CAPSULE, DELAYED RELEASE ORAL DAILY
Qty: 30 CAPSULE | Refills: 0 | Status: SHIPPED | OUTPATIENT
Start: 2025-03-31

## 2025-03-31 RX ORDER — ISOSORBIDE MONONITRATE 60 MG/1
60 TABLET, EXTENDED RELEASE ORAL DAILY
Qty: 90 TABLET | Refills: 3 | Status: SHIPPED | OUTPATIENT
Start: 2025-03-31

## 2025-03-31 NOTE — TELEPHONE ENCOUNTER
PCP: Lisa Lagunas DNP    LAST OFFICE VISIT: 01/14/2025    LAST REFILL PER CHART:  Medication:DULoxetine (CYMBALTA) 30 MG extended release capsule   Ordered On:10/01/2024  Instructions:Take 1 capsule by mouth daily   Dispense:90 capsules  Refills:1      Future Appointments   Date Time Provider Department Center   4/2/2025  1:15 PM IOC LAB VISIT [facility] DEP   4/9/2025  1:15 PM Lisa Lagunas DNP [facility] DEP   9/15/2025  2:45 PM Julian Castro MD [facility] BS AMB

## 2025-04-04 DIAGNOSIS — E11.65 UNCONTROLLED TYPE 2 DIABETES MELLITUS WITH HYPERGLYCEMIA (HCC): ICD-10-CM

## 2025-05-05 DIAGNOSIS — F41.9 ANXIETY: ICD-10-CM

## 2025-05-05 RX ORDER — DULOXETIN HYDROCHLORIDE 30 MG/1
30 CAPSULE, DELAYED RELEASE ORAL DAILY
Qty: 90 CAPSULE | Refills: 0 | Status: SHIPPED | OUTPATIENT
Start: 2025-05-05

## 2025-05-05 NOTE — TELEPHONE ENCOUNTER
Last Office visit:  1/14/20252    Last Filled: 3/31/2025; Qty 30 w/ 0 refills     Follow up visit:    Future Appointments   Date Time Provider Department Center   6/3/2025  1:30 PM IOC LAB VISIT [facility] DEP   6/10/2025 12:45 PM Lisa Lagunas DNP [facility] [address] [facility] DEP   9/15/2025  2:45 PM Julian Castro MD [facility] BS AMB

## 2025-06-03 ENCOUNTER — LAB (OUTPATIENT)
Facility: CLINIC | Age: 55
End: 2025-06-03

## 2025-06-03 ENCOUNTER — HOSPITAL ENCOUNTER (OUTPATIENT)
Facility: HOSPITAL | Age: 55
Setting detail: SPECIMEN
Discharge: HOME | End: 2025-06-06

## 2025-06-03 DIAGNOSIS — E11.65 UNCONTROLLED TYPE 2 DIABETES MELLITUS WITH HYPERGLYCEMIA (HCC): ICD-10-CM

## 2025-06-03 LAB — SENTARA SPECIMEN COLLECTION: NORMAL

## 2025-06-03 PROCEDURE — 99001 SPECIMEN HANDLING PT-LAB: CPT

## 2025-06-04 LAB
ANION GAP SERPL CALCULATED.3IONS-SCNC: 16 MMOL/L (ref 3–15)
BUN BLDV-MCNC: 13 MG/DL (ref 6–22)
CALCIUM SERPL-MCNC: 9.8 MG/DL (ref 8.4–10.5)
CHLORIDE BLD-SCNC: 100 MMOL/L (ref 98–110)
CO2: 24 MMOL/L (ref 20–32)
CREAT SERPL-MCNC: 1 MG/DL (ref 0.5–1.2)
ESTIMATED AVERAGE GLUCOSE: 171 MG/DL (ref 91–123)
GFR, ESTIMATED: 84.3 ML/MIN/1.73 SQ.M.
GLUCOSE: 203 MG/DL (ref 70–99)
HBA1C MFR BLD: 7.6 % (ref 4.8–5.6)
POTASSIUM SERPL-SCNC: 4 MMOL/L (ref 3.5–5.5)
SODIUM BLD-SCNC: 140 MMOL/L (ref 133–145)

## 2025-06-12 DIAGNOSIS — I10 ESSENTIAL HYPERTENSION: ICD-10-CM

## 2025-06-12 NOTE — TELEPHONE ENCOUNTER
Last Office visit:  1/14/2025    Last Filled: 10/1/2024; Qty 90 w/ 1 refill     Follow up visit:    Future Appointments   Date Time Provider Department Center   6/13/2025 11:00 AM [facility]   9/15/2025  2:45 PM Julian Castro MD [facility] BS AMB

## 2025-06-13 ENCOUNTER — OFFICE VISIT (OUTPATIENT)
Facility: CLINIC | Age: 55
End: 2025-06-13
Payer: COMMERCIAL

## 2025-06-13 VITALS
WEIGHT: 267 LBS | TEMPERATURE: 97.6 F | HEART RATE: 75 BPM | OXYGEN SATURATION: 97 % | DIASTOLIC BLOOD PRESSURE: 89 MMHG | SYSTOLIC BLOOD PRESSURE: 129 MMHG | BODY MASS INDEX: 38.22 KG/M2 | HEIGHT: 70 IN | RESPIRATION RATE: 20 BRPM

## 2025-06-13 DIAGNOSIS — E11.65 UNCONTROLLED TYPE 2 DIABETES MELLITUS WITH HYPERGLYCEMIA (HCC): Primary | ICD-10-CM

## 2025-06-13 DIAGNOSIS — F41.9 ANXIETY: ICD-10-CM

## 2025-06-13 DIAGNOSIS — I10 ESSENTIAL HYPERTENSION: ICD-10-CM

## 2025-06-13 PROCEDURE — 3079F DIAST BP 80-89 MM HG: CPT | Performed by: NURSE PRACTITIONER

## 2025-06-13 PROCEDURE — 99214 OFFICE O/P EST MOD 30 MIN: CPT | Performed by: NURSE PRACTITIONER

## 2025-06-13 PROCEDURE — 3074F SYST BP LT 130 MM HG: CPT | Performed by: NURSE PRACTITIONER

## 2025-06-13 PROCEDURE — 3051F HG A1C>EQUAL 7.0%<8.0%: CPT | Performed by: NURSE PRACTITIONER

## 2025-06-13 RX ORDER — DULOXETIN HYDROCHLORIDE 30 MG/1
30 CAPSULE, DELAYED RELEASE ORAL DAILY
Qty: 90 CAPSULE | Refills: 0 | Status: SHIPPED | OUTPATIENT
Start: 2025-06-13

## 2025-06-13 RX ORDER — SPIRONOLACTONE 25 MG/1
25 TABLET ORAL DAILY
Qty: 90 TABLET | Refills: 0 | Status: SHIPPED | OUTPATIENT
Start: 2025-06-13

## 2025-06-13 RX ORDER — LORAZEPAM 0.5 MG/1
0.5 TABLET ORAL NIGHTLY PRN
Qty: 30 TABLET | Refills: 0 | Status: SHIPPED | OUTPATIENT
Start: 2025-06-13 | End: 2026-06-13

## 2025-06-13 RX ORDER — SPIRONOLACTONE 25 MG/1
25 TABLET ORAL DAILY
Qty: 90 TABLET | Refills: 1 | OUTPATIENT
Start: 2025-06-13

## 2025-06-13 NOTE — ASSESSMENT & PLAN NOTE
- His A1c levels have decreased from 8.2 to 7.6, indicating an improvement in glycemic control.  - Increase Trulicity from 1.5 mg to 3 mg weekly   - Continue with metformin 500 mg once daily .   - Dietary recommendations include the consumption of protein bars and adherence to a high-protein diet.   - If his A1c levels remain above 7 at the next visit, the Trulicity dosage will be further increased to 4.5 mg. If his A1c levels persistently remain elevated, a switch from Trulicity to Mounjaro will be considered.    1/14/2025:  A1c 7.7-8.2  Despite working on diet and taking metformin 500 mg nightly, his A1c continues to rise  Continue metformin 500 mg nightly  Initiate Trulicity 0.75 mg weekly x 4 weeks  Increase Trulicity to 1.5 mg weekly x 8 weeks  He has been advised to reduce his carbohydrate intake by half during the first week of Trulicity treatment to prevent nausea, a common side effect. He has also been counseled to limit his alcohol consumption to one beer per day  Discussed importance of diet and exercise  Discussed negative impact of uncontrolled diabetes to include heart attack, stroke, vision and penile issues  A1c 3 months    10/3/2024:  Initiate metformin 500g qpm  Discussed possible side effects  A1c 7.6-7.7  Microalbumin elevated 23  Read DM booklet provided  Adore A1c      6/26/2024 ov note:  A1c 6.5-7.6  Recheck level 3 months  Discussed possible treatment options  If A1c does not decline back under 7 in 3 months, will need to initiate medication therapy.  Patient was surprised to see this number had elevated as he has not changed his diet  He has lost 5 pounds in 6 months  He is not happy about these numbers as he is not willing to start treatment  Provided patient with diabetic education booklet  Encourage patient to American diabetes Association website  Need to reduce carbs and sweets and increase exercise to lose weight  Due to patient's history of severe anxiety, and plan to reassess his

## 2025-06-13 NOTE — ASSESSMENT & PLAN NOTE
- His blood pressure readings are within the normal range.  - Continue carvedilol 12.5 mg twice daily  - Continue Imdur 60 mg daily  - Continue spironolactone 25 mg daily  - Follow-up with Dr. IONA Castro, cardiology.    10/3/2024:   Chronic, at goal (stable), continue current treatment plan  Dr. IONA Castro  Continue carvedilol 12.5 mg twice daily  Continue Imdur 60 mg daily  Continue spironolactone 25 mg daily  CMP WNL    Limit sodium in diet to 2g/d  Initiate cardio exercise  Weight reduction  Increase water intake  Check BP and report if 3 consecutive readings greater than 139/89 to office

## 2025-06-13 NOTE — ASSESSMENT & PLAN NOTE
- He uses lorazepam primarily for travel-related stress and does not overuse it.  - A refill for lorazepam 0.5 mg daily as needed will be provided.  - Last prescription refill 12/2023  - I have reviewed this patient's controlled substance prescription history through the Prescription Monitoring Program. No inconsistencies noted.  - Continue Cymbalta     10//2024:  Cont Cymbalta  Cont ativan prn  Call office when refill needed    12/4/23:  Cymbalta effective. Cont therapy  Last ativan refill June 2023 for 30 tabs  Pt seeking refill of ativan to have on hand.     9/18/23:  Lexapro dc'd due to s/e  Cymbalta 30mg initiated.      9/6/2023 note:  Cont Ativan prn  Initiate Lexapro 10mg   After 2 weeks if sx not improved, may increase to 20mg  Follow up 3m

## 2025-06-13 NOTE — PROGRESS NOTES
Lincoln Freeman Jr is a 54 y.o. male (: 1970) presenting to address:    Chief Complaint   Patient presents with    3 Month Follow-Up       Vitals:    25 1055   BP: 129/89   Pulse: 75   Resp: 20   Temp: 97.6 °F (36.4 °C)   SpO2: 97%       \"Have you been to the ER, urgent care clinic since your last visit?  Hospitalized since your last visit?\"    NO    “Have you seen or consulted any other health care providers outside of [facility] since your last visit?”    NO    “Have you had a colorectal cancer screening such as a colonoscopy/FIT/Cologuard?    NO    No colonoscopy on file  No cologuard on file  No FIT/FOBT on file   No flexible sigmoidoscopy on file            
Date    HEENT      tonsillectomy    LITHOTRIPSY  10/9/2010    Left upper 3rd ureteral calculus    ORTHOPEDIC SURGERY      Left shoulder     Allergies and Intolerances:   Allergies   Allergen Reactions    Penicillins      Other reaction(s): Not Reported This Time    Sulfa Antibiotics Other (See Comments)    Lexapro [Escitalopram Oxalate] Anxiety     Family History:   Family History   Problem Relation Age of Onset    No Known Problems Mother     Diabetes Father         insulin pump     Social History:   He  reports that he has never smoked. He has never used smokeless tobacco.   Social History     Substance and Sexual Activity   Alcohol Use Yes    Alcohol/week: 2.0 standard drinks of alcohol     Immunization History:  Immunization History   Administered Date(s) Administered    COVID-19, PFIZER PURPLE top, DILUTE for use, (age 12 y+), 30mcg/0.3mL 09/30/2021, 11/16/2021    TDaP, ADACEL (age 10y-64y), BOOSTRIX (age 10y+), IM, 0.5mL 09/06/2023         Return in about 3 months (around 9/13/2025) for Diabetes , Lab not fasting (A1c).      Dr. Lisa Lagunas, CHRISTIP-C, DNP  [facility]     The patient (or guardian, if applicable) and other individuals in attendance with the patient were advised that Artificial Intelligence will be utilized during this visit to record and process the conversation to generate a clinical note. The patient (or guardian, if applicable) and other individuals in attendance at the appointment consented to the use of AI, including the recording.

## 2025-09-02 ENCOUNTER — OFFICE VISIT (OUTPATIENT)
Age: 55
End: 2025-09-02
Payer: COMMERCIAL

## 2025-09-02 VITALS — WEIGHT: 260 LBS | BODY MASS INDEX: 37.31 KG/M2

## 2025-09-02 DIAGNOSIS — M99.08 RIB CAGE REGION SOMATIC DYSFUNCTION: ICD-10-CM

## 2025-09-02 DIAGNOSIS — M99.09 SOMATIC DYSFUNCTION OF ABDOMINAL REGION: ICD-10-CM

## 2025-09-02 DIAGNOSIS — M99.06 LOWER LIMB REGION SOMATIC DYSFUNCTION: ICD-10-CM

## 2025-09-02 DIAGNOSIS — M99.03 SOMATIC DYSFUNCTION OF LUMBAR REGION: ICD-10-CM

## 2025-09-02 DIAGNOSIS — G57.01 PIRIFORMIS SYNDROME, RIGHT: ICD-10-CM

## 2025-09-02 DIAGNOSIS — M99.07 UPPER EXTREMITY SOMATIC DYSFUNCTION: ICD-10-CM

## 2025-09-02 DIAGNOSIS — M51.362 DEGENERATION OF INTERVERTEBRAL DISC OF LUMBAR REGION WITH DISCOGENIC BACK PAIN AND LOWER EXTREMITY PAIN: Primary | ICD-10-CM

## 2025-09-02 DIAGNOSIS — M99.04 SACRAL REGION SOMATIC DYSFUNCTION: ICD-10-CM

## 2025-09-02 DIAGNOSIS — S76.011A SPRAIN, GLUTEUS MEDIUS, RIGHT, INITIAL ENCOUNTER: ICD-10-CM

## 2025-09-02 DIAGNOSIS — M99.01 CERVICAL SOMATIC DYSFUNCTION: ICD-10-CM

## 2025-09-02 DIAGNOSIS — M99.05 PELVIC REGION SOMATIC DYSFUNCTION: ICD-10-CM

## 2025-09-02 DIAGNOSIS — M99.02 THORACIC REGION SOMATIC DYSFUNCTION: ICD-10-CM

## 2025-09-02 PROCEDURE — 98929 OSTEOPATH MANJ 9-10 REGIONS: CPT | Performed by: FAMILY MEDICINE

## 2025-09-02 PROCEDURE — 99204 OFFICE O/P NEW MOD 45 MIN: CPT | Performed by: FAMILY MEDICINE

## 2025-09-02 RX ORDER — ETODOLAC 500 MG/1
500 TABLET, FILM COATED ORAL 2 TIMES DAILY PRN
Qty: 60 TABLET | Refills: 1 | Status: SHIPPED | OUTPATIENT
Start: 2025-09-02

## (undated) DEVICE — GUIDEWIRE VASC L185CM DIA0.014IN HYDRPHLC PTFE STR TIP

## (undated) DEVICE — RADIFOCUS OPTITORQUE ANGIOGRAPHIC CATHETER: Brand: OPTITORQUE

## (undated) DEVICE — COPILOT BLEEDBACK CONTROL VALVE: Brand: COPILOT

## (undated) DEVICE — BAND COMPR L29CM XLN CLR PLAS HEMSTAT EXT HK AND LOOP RETEN

## (undated) DEVICE — PRESSURE MONITORING SET: Brand: TRUWAVE

## (undated) DEVICE — PROCEDURE KIT FLUID MGMT 10 FR CUST MAINFOLD

## (undated) DEVICE — GLIDESHEATH SLENDER STAINLESS STEEL KIT: Brand: GLIDESHEATH SLENDER

## (undated) DEVICE — GUIDEWIRE VASC L260CM DIA0035IN TIP L3MM PTFE J STD TAPR FIX

## (undated) DEVICE — CATH GUID COR EB40 6FR 100CM -- LAUNCHER

## (undated) DEVICE — SURGICAL PROCEDURE KIT LT HRT CUST

## (undated) DEVICE — SET ANGIO L6.5IN L BOR 3 W STPCOCK SPIK TBNG